# Patient Record
Sex: MALE | Race: WHITE | Employment: OTHER | URBAN - METROPOLITAN AREA
[De-identification: names, ages, dates, MRNs, and addresses within clinical notes are randomized per-mention and may not be internally consistent; named-entity substitution may affect disease eponyms.]

---

## 2017-03-26 PROBLEM — Z72.0 TOBACCO ABUSE: Status: ACTIVE | Noted: 2017-03-26

## 2017-03-26 PROBLEM — R07.9 CHEST PAIN: Status: ACTIVE | Noted: 2017-03-26

## 2018-06-10 ENCOUNTER — HOSPITAL ENCOUNTER (EMERGENCY)
Age: 48
Discharge: HOME | End: 2018-06-10
Payer: COMMERCIAL

## 2018-06-10 VITALS
DIASTOLIC BLOOD PRESSURE: 72 MMHG | SYSTOLIC BLOOD PRESSURE: 106 MMHG | HEIGHT: 72 IN | WEIGHT: 185 LBS | BODY MASS INDEX: 25.06 KG/M2 | HEART RATE: 87 BPM | RESPIRATION RATE: 14 BRPM | TEMPERATURE: 98.3 F | OXYGEN SATURATION: 96 %

## 2018-06-10 DIAGNOSIS — K04.7 DENTAL ABSCESS: Primary | ICD-10-CM

## 2018-06-10 DIAGNOSIS — K02.9 DENTAL CARIES: ICD-10-CM

## 2018-06-10 PROCEDURE — 99282 EMERGENCY DEPT VISIT SF MDM: CPT

## 2018-06-10 RX ORDER — PENICILLIN V POTASSIUM 500 MG/1
500 TABLET ORAL 4 TIMES DAILY
Qty: 40 TABLET | Refills: 0 | Status: SHIPPED | OUTPATIENT
Start: 2018-06-10 | End: 2018-06-20

## 2018-06-10 RX ORDER — NAPROXEN 500 MG/1
500 TABLET ORAL 2 TIMES DAILY
Qty: 14 TABLET | Refills: 0 | Status: ON HOLD | OUTPATIENT
Start: 2018-06-10 | End: 2018-07-18

## 2018-06-10 ASSESSMENT — PAIN SCALES - GENERAL: PAINLEVEL_OUTOF10: 8

## 2018-06-10 ASSESSMENT — PAIN DESCRIPTION - PAIN TYPE: TYPE: ACUTE PAIN

## 2018-06-10 ASSESSMENT — PAIN DESCRIPTION - LOCATION: LOCATION: MOUTH

## 2018-06-10 ASSESSMENT — PAIN DESCRIPTION - ORIENTATION: ORIENTATION: RIGHT

## 2018-06-10 ASSESSMENT — PAIN DESCRIPTION - DESCRIPTORS: DESCRIPTORS: SORE

## 2018-07-17 ENCOUNTER — HOSPITAL ENCOUNTER (OUTPATIENT)
Age: 48
Setting detail: OBSERVATION
Discharge: HOME | End: 2018-07-18
Attending: EMERGENCY MEDICINE | Admitting: INTERNAL MEDICINE
Payer: COMMERCIAL

## 2018-07-17 ENCOUNTER — APPOINTMENT (OUTPATIENT)
Dept: CT IMAGING | Age: 48
End: 2018-07-17
Payer: COMMERCIAL

## 2018-07-17 ENCOUNTER — APPOINTMENT (OUTPATIENT)
Dept: GENERAL RADIOLOGY | Age: 48
End: 2018-07-17
Payer: COMMERCIAL

## 2018-07-17 DIAGNOSIS — R20.2 RIGHT LEG PARESTHESIAS: ICD-10-CM

## 2018-07-17 DIAGNOSIS — R07.9 CHEST PAIN, UNSPECIFIED TYPE: Primary | ICD-10-CM

## 2018-07-17 LAB
ALBUMIN SERPL-MCNC: 3.9 G/DL (ref 3.5–5.2)
ALP BLD-CCNC: 51 U/L (ref 40–129)
ALT SERPL-CCNC: 11 U/L (ref 0–40)
ANION GAP SERPL CALCULATED.3IONS-SCNC: 12 MMOL/L (ref 7–16)
AST SERPL-CCNC: 21 U/L (ref 0–39)
BILIRUB SERPL-MCNC: 0.2 MG/DL (ref 0–1.2)
BUN BLDV-MCNC: 17 MG/DL (ref 6–20)
CALCIUM SERPL-MCNC: 8.8 MG/DL (ref 8.6–10.2)
CHLORIDE BLD-SCNC: 109 MMOL/L (ref 98–107)
CO2: 25 MMOL/L (ref 22–29)
CREAT SERPL-MCNC: 1 MG/DL (ref 0.7–1.2)
D DIMER: 280 NG/ML DDU
EKG ATRIAL RATE: 82 BPM
EKG P AXIS: 66 DEGREES
EKG P-R INTERVAL: 136 MS
EKG Q-T INTERVAL: 388 MS
EKG QRS DURATION: 86 MS
EKG QTC CALCULATION (BAZETT): 453 MS
EKG R AXIS: 81 DEGREES
EKG T AXIS: 52 DEGREES
EKG VENTRICULAR RATE: 82 BPM
GFR AFRICAN AMERICAN: >60
GFR NON-AFRICAN AMERICAN: >60 ML/MIN/1.73
GLUCOSE BLD-MCNC: 113 MG/DL (ref 74–109)
HCT VFR BLD CALC: 40 % (ref 37–54)
HEMOGLOBIN: 13.5 G/DL (ref 12.5–16.5)
INR BLD: 1.1
MCH RBC QN AUTO: 29.2 PG (ref 26–35)
MCHC RBC AUTO-ENTMCNC: 33.8 % (ref 32–34.5)
MCV RBC AUTO: 86.6 FL (ref 80–99.9)
PDW BLD-RTO: 13.2 FL (ref 11.5–15)
PLATELET # BLD: 247 E9/L (ref 130–450)
PMV BLD AUTO: 10 FL (ref 7–12)
POTASSIUM SERPL-SCNC: 3.9 MMOL/L (ref 3.5–5)
PROTHROMBIN TIME: 13 SEC (ref 9.3–12.4)
RBC # BLD: 4.62 E12/L (ref 3.8–5.8)
SODIUM BLD-SCNC: 146 MMOL/L (ref 132–146)
TOTAL PROTEIN: 6.7 G/DL (ref 6.4–8.3)
TROPONIN: <0.01 NG/ML (ref 0–0.03)
WBC # BLD: 9.7 E9/L (ref 4.5–11.5)

## 2018-07-17 PROCEDURE — 71275 CT ANGIOGRAPHY CHEST: CPT

## 2018-07-17 PROCEDURE — 36415 COLL VENOUS BLD VENIPUNCTURE: CPT

## 2018-07-17 PROCEDURE — 84443 ASSAY THYROID STIM HORMONE: CPT

## 2018-07-17 PROCEDURE — 85378 FIBRIN DEGRADE SEMIQUANT: CPT

## 2018-07-17 PROCEDURE — 99285 EMERGENCY DEPT VISIT HI MDM: CPT

## 2018-07-17 PROCEDURE — 85610 PROTHROMBIN TIME: CPT

## 2018-07-17 PROCEDURE — 85027 COMPLETE CBC AUTOMATED: CPT

## 2018-07-17 PROCEDURE — 2140000000 HC CCU INTERMEDIATE R&B

## 2018-07-17 PROCEDURE — 83036 HEMOGLOBIN GLYCOSYLATED A1C: CPT

## 2018-07-17 PROCEDURE — 80053 COMPREHEN METABOLIC PANEL: CPT

## 2018-07-17 PROCEDURE — 71045 X-RAY EXAM CHEST 1 VIEW: CPT

## 2018-07-17 PROCEDURE — 84484 ASSAY OF TROPONIN QUANT: CPT

## 2018-07-17 PROCEDURE — 80061 LIPID PANEL: CPT

## 2018-07-17 PROCEDURE — 6360000004 HC RX CONTRAST MEDICATION: Performed by: RADIOLOGY

## 2018-07-17 PROCEDURE — 93005 ELECTROCARDIOGRAM TRACING: CPT | Performed by: EMERGENCY MEDICINE

## 2018-07-17 PROCEDURE — 6370000000 HC RX 637 (ALT 250 FOR IP): Performed by: STUDENT IN AN ORGANIZED HEALTH CARE EDUCATION/TRAINING PROGRAM

## 2018-07-17 RX ORDER — SODIUM CHLORIDE 9 MG/ML
INJECTION, SOLUTION INTRAVENOUS CONTINUOUS
Status: DISCONTINUED | OUTPATIENT
Start: 2018-07-17 | End: 2018-07-18 | Stop reason: HOSPADM

## 2018-07-17 RX ORDER — ASPIRIN 81 MG/1
324 TABLET, CHEWABLE ORAL ONCE
Status: COMPLETED | OUTPATIENT
Start: 2018-07-17 | End: 2018-07-17

## 2018-07-17 RX ORDER — ACETAMINOPHEN 325 MG/1
650 TABLET ORAL EVERY 4 HOURS PRN
Status: DISCONTINUED | OUTPATIENT
Start: 2018-07-17 | End: 2018-07-18 | Stop reason: HOSPADM

## 2018-07-17 RX ORDER — SODIUM CHLORIDE 0.9 % (FLUSH) 0.9 %
10 SYRINGE (ML) INJECTION PRN
Status: DISCONTINUED | OUTPATIENT
Start: 2018-07-17 | End: 2018-07-18

## 2018-07-17 RX ORDER — SODIUM CHLORIDE 0.9 % (FLUSH) 0.9 %
10 SYRINGE (ML) INJECTION EVERY 12 HOURS SCHEDULED
Status: DISCONTINUED | OUTPATIENT
Start: 2018-07-17 | End: 2018-07-18

## 2018-07-17 RX ADMIN — ASPIRIN 81 MG 324 MG: 81 TABLET ORAL at 21:28

## 2018-07-17 RX ADMIN — IOPAMIDOL 60 ML: 755 INJECTION, SOLUTION INTRAVENOUS at 23:06

## 2018-07-17 ASSESSMENT — ENCOUNTER SYMPTOMS
CONSTIPATION: 0
BACK PAIN: 0
NAUSEA: 0
SORE THROAT: 0
COUGH: 0
COLOR CHANGE: 0
ABDOMINAL PAIN: 0
VOMITING: 0
DIARRHEA: 0
SHORTNESS OF BREATH: 0

## 2018-07-17 ASSESSMENT — HEART SCORE: ECG: 0

## 2018-07-18 ENCOUNTER — TELEPHONE (OUTPATIENT)
Dept: ADMINISTRATIVE | Age: 48
End: 2018-07-18

## 2018-07-18 ENCOUNTER — APPOINTMENT (OUTPATIENT)
Dept: NUCLEAR MEDICINE | Age: 48
End: 2018-07-18
Payer: COMMERCIAL

## 2018-07-18 VITALS
OXYGEN SATURATION: 95 % | HEART RATE: 60 BPM | RESPIRATION RATE: 14 BRPM | WEIGHT: 174.3 LBS | HEIGHT: 72 IN | SYSTOLIC BLOOD PRESSURE: 110 MMHG | BODY MASS INDEX: 23.61 KG/M2 | DIASTOLIC BLOOD PRESSURE: 60 MMHG | TEMPERATURE: 98.1 F

## 2018-07-18 LAB
CHOLESTEROL, TOTAL: 175 MG/DL (ref 0–199)
HBA1C MFR BLD: 5.4 % (ref 4–5.6)
HDLC SERPL-MCNC: 36 MG/DL
LDL CHOLESTEROL CALCULATED: 112 MG/DL (ref 0–99)
LV EF: 49 %
LVEF MODALITY: NORMAL
TRIGL SERPL-MCNC: 137 MG/DL (ref 0–149)
TROPONIN: <0.01 NG/ML (ref 0–0.03)
TSH SERPL DL<=0.05 MIU/L-ACNC: 0.75 UIU/ML (ref 0.27–4.2)
VLDLC SERPL CALC-MCNC: 27 MG/DL

## 2018-07-18 PROCEDURE — 36415 COLL VENOUS BLD VENIPUNCTURE: CPT

## 2018-07-18 PROCEDURE — 99244 OFF/OP CNSLTJ NEW/EST MOD 40: CPT | Performed by: INTERNAL MEDICINE

## 2018-07-18 PROCEDURE — 93017 CV STRESS TEST TRACING ONLY: CPT

## 2018-07-18 PROCEDURE — A9500 TC99M SESTAMIBI: HCPCS | Performed by: RADIOLOGY

## 2018-07-18 PROCEDURE — 6360000002 HC RX W HCPCS: Performed by: INTERNAL MEDICINE

## 2018-07-18 PROCEDURE — 78452 HT MUSCLE IMAGE SPECT MULT: CPT

## 2018-07-18 PROCEDURE — G0378 HOSPITAL OBSERVATION PER HR: HCPCS

## 2018-07-18 PROCEDURE — 84484 ASSAY OF TROPONIN QUANT: CPT

## 2018-07-18 PROCEDURE — 96372 THER/PROPH/DIAG INJ SC/IM: CPT

## 2018-07-18 PROCEDURE — 3430000000 HC RX DIAGNOSTIC RADIOPHARMACEUTICAL: Performed by: RADIOLOGY

## 2018-07-18 PROCEDURE — APPSS60 APP SPLIT SHARED TIME 46-60 MINUTES: Performed by: NURSE PRACTITIONER

## 2018-07-18 PROCEDURE — 2580000003 HC RX 258: Performed by: INTERNAL MEDICINE

## 2018-07-18 RX ORDER — SODIUM CHLORIDE 0.9 % (FLUSH) 0.9 %
10 SYRINGE (ML) INJECTION EVERY 12 HOURS SCHEDULED
Status: DISCONTINUED | OUTPATIENT
Start: 2018-07-18 | End: 2018-07-18 | Stop reason: HOSPADM

## 2018-07-18 RX ORDER — SODIUM CHLORIDE 0.9 % (FLUSH) 0.9 %
10 SYRINGE (ML) INJECTION PRN
Status: DISCONTINUED | OUTPATIENT
Start: 2018-07-18 | End: 2018-07-18 | Stop reason: HOSPADM

## 2018-07-18 RX ADMIN — Medication 10 MILLICURIE: at 11:32

## 2018-07-18 RX ADMIN — Medication 10 ML: at 09:18

## 2018-07-18 RX ADMIN — Medication 34 MILLICURIE: at 13:37

## 2018-07-18 RX ADMIN — Medication 10 ML: at 02:00

## 2018-07-18 RX ADMIN — ENOXAPARIN SODIUM 40 MG: 100 INJECTION SUBCUTANEOUS at 09:17

## 2018-07-18 RX ADMIN — SODIUM CHLORIDE: 9 INJECTION, SOLUTION INTRAVENOUS at 02:00

## 2018-07-18 ASSESSMENT — PAIN SCALES - GENERAL
PAINLEVEL_OUTOF10: 0
PAINLEVEL_OUTOF10: 0

## 2018-07-18 NOTE — ED NOTES
Bed: 18B-18  Expected date:   Expected time:   Means of arrival:   Comments:  Triage      Aldair Doty RN  07/17/18 2052

## 2018-07-18 NOTE — H&P
disease)        History reviewed. No pertinent surgical history. Family Status   Relation Status    Mother     Father         Prior to Admission medications    Medication Sig Start Date End Date Taking? Authorizing Provider   ibuprofen (ADVIL;MOTRIN) 800 MG tablet Take 1 tablet by mouth every 6 hours as needed for Pain 17  JENIFFER Ramirez        Social History     Social History    Marital status:      Spouse name: N/A    Number of children: N/A    Years of education: N/A     Social History Main Topics    Smoking status: Current Every Day Smoker     Packs/day: 0.50     Years: 30.00    Smokeless tobacco: Never Used    Alcohol use Yes      Comment: Occasionally    Drug use: No      Comment: Pt did cocaine x30 years- stopped a few months ago     Sexual activity: Yes     Partners: Female     Other Topics Concern    None     Social History Narrative    None       No Known Allergies    The patient's medical records have been reviewed. Review of Systems:   · General: Denies malaise or weakness. Denies fever or chills. · Eyes: No visual changes or diplopia. No swelling or pain. · ENT: No Headaches, tinnitus or vertigo. No mouth sores or sore throat. · Cardiovascular: HPI  · Respiratory: No cough or wheezing, hemoptysis, sob, pleuritic pain. · Gastrointestinal: No anorexia, hematochezia, melena, hematemesis or change in bowels. · Genitourinary: No dysuria, trouble voiding, or hematuria. No change in urination. · Musculoskeletal:  No joint pain or inflammation. No limb weakness. · Integumentary: No rash or pruritis. No abnormal pigmentation,  masses, hair or nail changes  · Neurological: No unusual headaches, weakness, numbness or tingling. No change in gait, balance, coordination, memory, mentation, behavior. · Psychiatric: No anxiety, or depression. Mood and affect reported as normal  · Endocrine: No temperature intolerance.  No polydipsia or polyuria. · Hematologic: No abnormal bruising or bleeding, blood clots or swollen lymph nodes. no anemia, abnormal bleeding/bruising, fever,chills, night sweats, swollen glands. · Allergic/Immunologic: No nasal congestion or hives. Physical Examination:      Wt Readings from Last 3 Encounters:   07/18/18 174 lb 4.8 oz (79.1 kg)   06/10/18 185 lb (83.9 kg)   11/24/17 180 lb (81.6 kg)     Temp Readings from Last 3 Encounters:   07/18/18 98 °F (36.7 °C) (Oral)   06/10/18 98.3 °F (36.8 °C) (Temporal)   11/24/17 97.6 °F (36.4 °C)     BP Readings from Last 3 Encounters:   07/18/18 117/74   06/10/18 106/72   11/24/17 131/81     Pulse Readings from Last 3 Encounters:   07/18/18 65   06/10/18 87   11/24/17 85     @LASTSAO2(3)@  General appearance: Normal, awake, alert no distress. Skin: Color, texture, turgor normal. No rashes or lesions. Head: Normocephalic. No masses, lesions, tenderness or abnormalities   Face: Symmetric no visible lesions  Eyes: Conjunctivae/cornea clear. [place]. Sclera non icteric. Ears: External appearance normal.  Hearing grossly normal  Nose/Sinuses: Nares normal. No paranasal sinus tenderness. Mouth: Lips and tongue appear normal. Dentition noted  Neck:  Symmetric. No adenopathy. Thyroid symmetric, normal size, without nodules. Trachea is midline. Carotids palpable-and assessed. Chest: Even excursion   Lungs: Clear to auscultation. No rhonchi, crackles or rales. Heart: S1 > S2. Regular rate and rhythm. No gallop rub or murmur. Abdomen: Soft, mildly protuberant, non-tender. BS normal. No masses, organomegaly. Anatomic contours appear normal.   Extremities: No deformities, edema, or skin discoloration. Peripheral perfusion assessed in all exremities. No cyanosis  Musculoskeletal: No unusual pain or swelling. Muscular strength intact. Neuro:   · Cranial nerves grossly intact. · Motor: Strength grossly normal. No focal weakness. · Sensory: grossly normal to touch. · No cerebellar signs---Coordination intact. Mental status: Awake, alert, cognizant and interactive. Patient appears capable of directing self care   Mood: Normal and appropriate affect  Gait & balance: not assessed:     Labs     CBC:   Lab Results   Component Value Date    WBC 9.7 07/17/2018    RBC 4.62 07/17/2018    HGB 13.5 07/17/2018    HCT 40.0 07/17/2018     07/17/2018    MCV 86.6 07/17/2018     BMP:    Lab Results   Component Value Date     07/17/2018    K 3.9 07/17/2018     07/17/2018    CO2 25 07/17/2018    BUN 17 07/17/2018    CREATININE 1.0 07/17/2018    GLUCOSE 113 07/17/2018    CALCIUM 8.8 07/17/2018     Hepatic Function Panel:    Lab Results   Component Value Date    ALKPHOS 51 07/17/2018    AST 21 07/17/2018    ALT 11 07/17/2018    PROT 6.7 07/17/2018    LABALBU 3.9 07/17/2018    BILITOT 0.2 07/17/2018     Magnesium:  No results found for: MG  Cardiac Enzymes:   Lab Results   Component Value Date    TROPONINI <0.01 07/17/2018    TROPONINI <0.01 03/26/2017    TROPONINI <0.01 03/25/2017     LDH:  No results found for: LDH  PT/INR:    Lab Results   Component Value Date    PROTIME 13.0 07/17/2018    INR 1.1 07/17/2018     BNP: No results for input(s): BNP in the last 72 hours.    TSH:   Lab Results   Component Value Date    TSH 0.750 07/17/2018      Cardiac Injury Profile:   Recent Labs      07/17/18   2121   TROPONINI  <0.01      Lipid Profile:   Lab Results   Component Value Date    TRIG 137 07/17/2018    HDL 36 07/17/2018    LDLCALC 112 07/17/2018    CHOL 175 07/17/2018      Hemoglobin A1C: No components found for: HGBA1C   U/A: No results found for: NITRITE, LEUKOCYTESUR, PHUR, WBCUA, RBCUA, BACTERIA, SPECGRAV, BLOODU, GLUCOSEU  ABG:  No results found for: PHART, DXE4ETC, PO2ART, R0RDJMMG, LVU9IKO, BEART  TSH:    Lab Results   Component Value Date    TSH 0.750 07/17/2018       ADMISSION SCHEDULED MEDS:   Current Facility-Administered Medications   Medication Dose Route Frequency Provider Last Rate Last Dose    sodium chloride flush 0.9 % injection 10 mL  10 mL Intravenous 2 times per day Junaid Billings MD   10 mL at 07/18/18 0918    sodium chloride flush 0.9 % injection 10 mL  10 mL Intravenous PRN Junaid Billings MD        magnesium hydroxide (MILK OF MAGNESIA) 400 MG/5ML suspension 30 mL  30 mL Oral Daily PRN Junaid Billings MD        enoxaparin (LOVENOX) injection 40 mg  40 mg Subcutaneous Daily Junaid Billings MD   40 mg at 07/18/18 5847    acetaminophen (TYLENOL) tablet 650 mg  650 mg Oral Q4H PRN Junaid Billings MD        0.9 % sodium chloride infusion   Intravenous Continuous Junaid Billings MD 75 mL/hr at 07/18/18 0200         Current  Infusions   sodium chloride 75 mL/hr at 07/18/18 0200       Prn Meds  sodium chloride flush, magnesium hydroxide, acetaminophen    Radiology Review:  CTA CHEST W CONTRAST   Final Result      1. No pulmonary embolism. 2.  6 mm noncalcified nodule within the posterior left lower lobe (series 4,    image 11). ACR White Paper guidelines ([employer] al. Radiology 2017;    912(2):468-43) suggest the following. For low-risk patients, no follow-up is    necessary. For high-risk patients (smoking history or other known risk    factors) an optional chest CT at 12 months could be performed. 3.  Incidental/non-acute findings are described above.       This report has been electronically signed by Shi Altamirano MD.      XR CHEST PORTABLE   Final Result   normal chest                     Stress/Rest NM Myocardial SPECT RX    (Results Pending)         ASSESSMENT:  NON-CARDIAC CHEST PAIN  Patient Active Problem List   Diagnosis    Chest pain    Tobacco use    Atypical chest pain    Cocaine use    Nodule of left lung    Numbness and tingling of right lower extremity       PLAN:  HE IS HAVING STRESS TEST DONE  WILL DETERMINE DISPOSITION WHEN RESULTED  DISCUSSED OTHER DIAGNOSIS THAT NEED EVALUATED AFTER  BY pcp    See  Orders  Wayne Curran MD, Opal Myers, American Board of Internal Medicine  Diplomate, [facility] Board of Geriatric Medicine  10:49 AM  7/18/2018

## 2018-07-18 NOTE — H&P
(83.9 kg)   11/24/17 180 lb (81.6 kg)     Temp Readings from Last 3 Encounters:   07/18/18 98 °F (36.7 °C) (Oral)   06/10/18 98.3 °F (36.8 °C) (Temporal)   11/24/17 97.6 °F (36.4 °C)     BP Readings from Last 3 Encounters:   07/18/18 117/74   06/10/18 106/72   11/24/17 131/81     Pulse Readings from Last 3 Encounters:   07/18/18 65   06/10/18 87   11/24/17 85     @LASTSAO2(3)@  General appearance: Normal, awake, alert no distress. Skin: Color, texture, turgor normal. No rashes or lesions. Head: Normocephalic. No masses, lesions, tenderness or abnormalities   Face: Symmetric no visible lesions  Eyes: Conjunctivae/cornea clear. Aundra [place]. Sclera non icteric. Ears: External appearance normal.  Hearing grossly normal  Nose/Sinuses: Nares normal. No paranasal sinus tenderness. Mouth: Lips and tongue appear normal. Dentition noted  Neck:  Symmetric. No adenopathy. Thyroid symmetric, normal size, without nodules. Trachea is midline. Carotids palpable-and assessed. Chest: Even excursion   Lungs: Clear to auscultation. No rhonchi, crackles or rales. Heart: S1 > S2. Regular rate and rhythm. No gallop rub or murmur. Abdomen: Soft, mildly protuberant, non-tender. BS normal. No masses, organomegaly. Anatomic contours appear normal.   Extremities: No deformities, edema, or skin discoloration. Peripheral perfusion assessed in all exremities. No cyanosis  Musculoskeletal: No unusual pain or swelling. Muscular strength intact. Neuro:   · Cranial nerves grossly intact. · Motor: Strength grossly normal. No focal weakness. · Sensory: grossly normal to touch. · No cerebellar signs---Coordination intact. Mental status: Awake, alert, cognizant and interactive. Patient appears capable of directing self care   Mood: Normal and appropriate affect  Gait & balance: not assessed:     Labs     CBC:   Lab Results   Component Value Date    WBC 9.7 07/17/2018    RBC 4.62 07/17/2018    HGB 13.5 07/17/2018    HCT 40.0 Brigitte Vila MD        enoxaparin (LOVENOX) injection 40 mg  40 mg Subcutaneous Daily Conrad Quinteros MD   40 mg at 07/18/18 1562    technetium sestamibi (CARDIOLITE) injection 34 millicurie  34 millicurie Intravenous ONCE PRN Namrata Rae II, MD        acetaminophen (TYLENOL) tablet 650 mg  650 mg Oral Q4H PRN Conrad Quinteros MD        0.9 % sodium chloride infusion   Intravenous Continuous Conrad Quinteros MD 75 mL/hr at 07/18/18 0200         Current  Infusions   sodium chloride 75 mL/hr at 07/18/18 0200       Prn Meds  sodium chloride flush, magnesium hydroxide, technetium sestamibi, acetaminophen    Radiology Review:  CTA CHEST W CONTRAST   Final Result      1. No pulmonary embolism. 2.  6 mm noncalcified nodule within the posterior left lower lobe (series 4,    image 11). ACR White Paper guidelines ([place] al. Radiology 2017;    876(5):969-15) suggest the following. For low-risk patients, no follow-up is    necessary. For high-risk patients (smoking history or other known risk    factors) an optional chest CT at 12 months could be performed. 3.  Incidental/non-acute findings are described above.       This report has been electronically signed by Natali Diego MD.      XR CHEST PORTABLE   Final Result   normal chest                     Stress/Rest NM Myocardial SPECT RX    (Results Pending)         ASSESSMENT:  Patient Active Problem List   Diagnosis    Chest pain    Tobacco use    Atypical chest pain    Cocaine use    Nodule of left lung    Numbness and tingling of right lower extremity       PLAN:  Await stress test completion  He needs to see a PCP-has not received any healthcare      See  Orders  Hamp Kehr, MD, [address], American Board of Internal Medicine  Diplomate, [facility] Board of Geriatric Medicine  12:29 PM  7/18/2018

## 2018-07-18 NOTE — CONSULTS
headaches or hearing loss. No mouth sores or sore throat. No epistaxis   · Cardiovascular: + chest pain. Denies palpitations. No lower extremity swelling. · Respiratory: + Wheezing/SY. + Dry cough. Denies orthopnea or PND. No hemoptysis   · Gastrointestinal: Denies hematemesis or anorexia. No hematochezia or melena    · Genitourinary: Denies urgency, dysuria or hematuria. · Musculoskeletal: Denies gait disturbance, weakness or joint complaints  · Integumentary: Denies rash, hives or pruritis   · Neurological: Denies dizziness, headaches or seizures. No numbness/ tingling RLE  · Psychiatric: + feels anxious. · Endocrine: Denies temperature intolerance. No recent weight change. .  · Hematologic/Lymphatic: Denies abnormal bruising or bleeding. No swollen lymph nodes    PHYSICAL EXAM:   /74   Pulse 65   Temp 98 °F (36.7 °C) (Oral)   Resp 16   Ht 6' (1.829 m)   Wt 174 lb 4.8 oz (79.1 kg)   SpO2 96%   BMI 23.64 kg/m²   CONST:  Well developed, well nourished who appears of stated age. Awake, alert and cooperative. No apparent distress. HEENT:   Head- Normocephalic, atraumatic   Eyes- Conjunctivae pink, anicteric  Throat- Oral mucosa pink and moist  Neck-  No stridor, trachea midline, no jugular venous distention. No carotid bruit. CHEST: Chest symmetrical and non-tender to palpation. No accessory muscle use or intercostal retractions. R chest wall probable lipoma. RESPIRATORY: Lung sounds - coarse BS with some faint expiratory wheezing. On RA. CARDIOVASCULAR:     Heart Ausculation- Regular rate and rhythm, no murmur. No rub   PV: No lower extremity edema. No varicosities. Pedal pulses palpable, no clubbing or cyanosis   ABDOMEN: Soft, non-tender to light palpation. Bowel sounds present. No palpable masses; no abdominal bruit  MS: Good muscle strength and tone. No atrophy or abnormal movements.    : Deferred  SKIN: Warm and dry no statis dermatitis or ulcers   NEURO / PSYCH: Oriented to person, place and time. Speech clear and appropriate. Follows all commands. Pleasant affect     DATA:    ECG See HPI  Tele strips: SR  Diagnostic:  See HPI    Intake/Output Summary (Last 24 hours) at 07/18/18 0948  Last data filed at 07/18/18 0655   Gross per 24 hour   Intake              525 ml   Output                0 ml   Net              525 ml       Labs:   CBC:   Recent Labs      07/17/18 2121   WBC  9.7   HGB  13.5   HCT  40.0   PLT  247     BMP: Recent Labs      07/17/18 2121   NA  146   K  3.9   CO2  25   BUN  17   CREATININE  1.0   LABGLOM  >60   CALCIUM  8.8       HgA1c:   Lab Results   Component Value Date    LABA1C 5.4 03/30/2017     PT/INR:   Recent Labs      07/17/18 2121   PROTIME  13.0*   INR  1.1       CARDIAC ENZYMES:  Recent Labs      07/17/18 2121   TROPONINI  <0.01     FASTING LIPID PANEL:  Lab Results   Component Value Date    CHOL 176 03/26/2017    HDL 42 03/26/2017    LDLCALC 111 03/26/2017    TRIG 115 03/26/2017     LIVER PROFILE:  Recent Labs      07/17/18 2121   AST  21   ALT  11   LABALBU  3.9   ASSESSMENT:  1. Atypical CP: Troponin x 1 negative, no EKG changes  2. SY/Wheezing. Hx of \"chronic bronchitis\"  3. Ongoing tobacco use  4. L lung nodule  5. Hx cocaine use (snorts)  6. RLE pain with ambulation>>??PVD    PLAN:  1. Troponin now  2. Lipid profile/UDS  3. Exercise Nuclear stress this afternoon. If negative patient may be discharged home from cardiology standpoint  4. Discussed need for patient to establish with PCP, willing to establish at [facility], RN notified. 5. Smoking cessation stressed to patient    Discussed with Dr Pam Germain  Electronically signed by Reij Bergeron.  ABRAN Liz on 7/18/2018 at 9:48 AM     Addendum:  Leopoldo Buddle, MD    Reason for consult:  Chest pain    Pt known not known to  [facility]    History of Present illness: 52 yr pt with significant family Hx of premature CAD admitted for chest pain; cardiology consulted for further recommendations; Denies any further CP; no SOB or palpitations; no Orthopnea    Review of systems:  Review of 10 systems negative except as mentioned in the HPI    History: Past Medical and Surgical history: Reviewed    Allergies and Social Hx: Reviewed. Medications: reviewed. Labs/imaging studies: Reviewed. Above SANDRA exam, assessment reviewed reviewed. I personally saw, examined, and evaluated the patient today. I personally reviewed the medications, rhythm strips, pertinent labs and test reports. I directly participated in the medical-decision making, ordering pertinent tests and medication adjustment. Physical exam:     Vitals:    07/18/18 0834   BP: 117/74   Pulse: 65   Resp: 16   Temp: 98 °F (36.7 °C)   SpO2: 96%       In general, this is a well developed, well nourished who appears stated age. awake, alert, cooperative, no apparent distress    HEENT: eyes -conjunctivae pink,   Neck-  no stridor, no carotid bruit. no jugular venous distention   RESPIRATORY: Chest symmetrical and non-tender to palpation. No accessory muscle use. Lung auscultation - clear to auscultation except few rhonchi  CARDIOVASCULAR:     Heart Inspection shows no noted pulsations  Heart Palpation - no palpable thrills  Heart Ausculation - Regular rate and rhythm, 1/6 systolic murmur, No s3 or rub. No lower extremity edema, no varicosities. Distal pulses palpable, no clubbing or cyanosis   ABDOMEN: Soft, nontender,  Bowel sounds present. MS: good muscle strength and tone. : Deferred  Rectal Exam: Deferred  SKIN: warm and dry  NEURO / PSYCH: oriented to person, place        Impression/Recommendations:    Chest pain:  MI ruled out-Exercise nuclear stress test due to CP, Early CAD in several family members    Tobacco use: Counseled to quit smoking    SY: Mild, no acute CHF, better now          If stress test normal, can be discharged home      Thank you for the consult.         Hamzah Thomas MD  7/18/2018  10:24 AM  [facility])

## 2018-07-18 NOTE — ED PROVIDER NOTES
The patient is a 51-year-old male with a history of GERD chronic low back pain who presents to the emergency department complaining of 3 episodes of left-sided chest pain that occur for a few minutes at a time. The pain woke the patient up from sleep at 0530 this morning. The pain is described as sharp and nonradiating and occurs sporadically. Patient is not taking any medication for pain relief. He does smoke cigarettes but denies any alcohol or drug use. The patient had a similar episode of chest pain one year ago during which she was admitted to the hospital and had a negative cardiac workup and stress test. He states that all of his brothers, sisters and parents have had heart attacks and hear disease. The patient denies any nausea or vomiting. Vital signs are normal at this time. Additionally,            Review of Systems   Constitutional: Negative for activity change, appetite change, chills, diaphoresis, fatigue and fever. HENT: Negative for congestion and sore throat. Eyes: Negative for visual disturbance. Respiratory: Negative for cough and shortness of breath. Cardiovascular: Positive for chest pain (Left parasternal). Negative for palpitations and leg swelling. Gastrointestinal: Negative for abdominal pain, constipation, diarrhea, nausea and vomiting. Endocrine: Negative for polyuria. Genitourinary: Negative for decreased urine volume, difficulty urinating, dysuria, flank pain and hematuria. Musculoskeletal: Negative for arthralgias, back pain, joint swelling, myalgias, neck pain and neck stiffness. Skin: Negative for color change, pallor, rash and wound. Allergic/Immunologic: Negative for immunocompromised state. Neurological: Negative for dizziness, seizures, syncope, weakness, light-headedness and headaches. Hematological: Negative for adenopathy. Does not bruise/bleed easily. Psychiatric/Behavioral: Negative.         Physical Exam   Constitutional: He is oriented to person, place, and time. He appears well-developed and well-nourished. No distress. HENT:   Head: Normocephalic and atraumatic. Right Ear: External ear normal.   Left Ear: External ear normal.   Nose: Nose normal.   Mouth/Throat: Oropharynx is clear and moist. No oropharyngeal exudate. Eyes: Conjunctivae and EOM are normal. Pupils are equal, round, and reactive to light. Right eye exhibits no discharge. Left eye exhibits no discharge. No scleral icterus. Neck: Normal range of motion. Neck supple. No JVD present. No tracheal deviation present. No thyromegaly present. Cardiovascular: Normal rate, regular rhythm, normal heart sounds and intact distal pulses. Exam reveals no gallop and no friction rub. No murmur heard. Pulmonary/Chest: Effort normal and breath sounds normal. No stridor. No respiratory distress. He has no wheezes. He has no rales. He exhibits no tenderness. Abdominal: Soft. Bowel sounds are normal. He exhibits no distension and no mass. There is no tenderness. There is no rebound and no guarding. Musculoskeletal: Normal range of motion. He exhibits no edema, tenderness or deformity. Lymphadenopathy:     He has no cervical adenopathy. Neurological: He is alert and oriented to person, place, and time. Skin: Skin is warm and dry. No rash noted. He is not diaphoretic. No erythema. No pallor. Psychiatric: He has a normal mood and affect. His behavior is normal. Judgment and thought content normal.   Nursing note and vitals reviewed. Procedures    MDM  The patient presented with multiple episodes of left-sided chest pain. Patient was reported intermittent right sided leg numbness. The patient was a symptomatically the emergency department. Patient had a negative cardiac workup. He was given chewable aspirin. Patient has a strong family history of cardiac disease.  Due to a slightly elevated d-dimer of 280 as well as the report of intermittent leg numbness a CTA of the chest was is signed and interpreted by me. Rate: 82  Rhythm: Sinus  Interpretation: no acute changes  Comparison: stable as compared to patient's most recent EKG on 3/25/2018    ED Course as of Jul 18 0003 Tue Jul 17, 2018 2222 Patient reassessed. He is chest pain-free at this time. He is updated on his lab results and plan for CTA of the chest due to an elevated d-dimer. Patient agreeable to admission.  [LS]      ED Course User Index  [LS] Yannick SuttonDO       --------------------------------------------- PAST HISTORY ---------------------------------------------  Past Medical History:  has a past medical history of GERD (gastroesophageal reflux disease). Past Surgical History:  has no past surgical history on file. Social History:  reports that he has been smoking. He has a 15.00 pack-year smoking history. He has never used smokeless tobacco. He reports that he drinks alcohol. He reports that he does not use drugs. Family History: family history includes Diabetes in his father and mother; Heart Disease in his father and mother; High Blood Pressure in his father and mother; High Cholesterol in his father and mother. The patients home medications have been reviewed. Allergies: Patient has no known allergies.     -------------------------------------------------- RESULTS -------------------------------------------------    LABS:  Results for orders placed or performed during the hospital encounter of 07/17/18   CBC   Result Value Ref Range    WBC 9.7 4.5 - 11.5 E9/L    RBC 4.62 3.80 - 5.80 E12/L    Hemoglobin 13.5 12.5 - 16.5 g/dL    Hematocrit 40.0 37.0 - 54.0 %    MCV 86.6 80.0 - 99.9 fL    MCH 29.2 26.0 - 35.0 pg    MCHC 33.8 32.0 - 34.5 %    RDW 13.2 11.5 - 15.0 fL    Platelets 171 445 - 893 E9/L    MPV 10.0 7.0 - 12.0 fL   Comprehensive Metabolic Panel   Result Value Ref Range    Sodium 146 132 - 146 mmol/L    Potassium 3.9 3.5 - 5.0 mmol/L    Chloride 109 (H) 98 - 107 mmol/L    CO2 25 22 - 29 chest pains. Patient reports he has 3 episodes that lasted over 5-10 minutes today. Patient reports feeling near syncopal. Patient also going vague pain in his leg/numbness. Patient reporting no bone pain or vomiting. Patient does report significant history of heart disease in the family. Patient currently having no chest pains. Patient awake alert oriented ×3 heart and lung exam within normal limits abdomen is soft and nontender pulses are intact distally. Labs and EKG reviewed. D-dimer slightly elevated and due to his chest pain as well as this numbness/discomfort in his leg CT was ordered. We did speak to on-call internal medicine patient will be admitted.          Torsten Cantu MD  07/18/18 0246       Torsten Cantu MD  07/18/18 9646

## 2018-07-18 NOTE — PROGRESS NOTES
Exercise Nuclear Stress Test:    Treadmill stress completed. Achieved 810% of THR and 12.4 METS DTS +11 without chest pain; No ischemia on ECG. Nuclear SPECT images pending.     Electronically signed by Hansel Munoz MD on 7/18/2018 at 1:41 PM

## 2018-08-22 ENCOUNTER — HOSPITAL ENCOUNTER (EMERGENCY)
Age: 48
Discharge: HOME | End: 2018-08-22
Payer: COMMERCIAL

## 2018-08-22 VITALS
HEIGHT: 72 IN | OXYGEN SATURATION: 98 % | BODY MASS INDEX: 24.38 KG/M2 | DIASTOLIC BLOOD PRESSURE: 82 MMHG | WEIGHT: 180 LBS | HEART RATE: 73 BPM | RESPIRATION RATE: 16 BRPM | TEMPERATURE: 98.3 F | SYSTOLIC BLOOD PRESSURE: 112 MMHG

## 2018-08-22 DIAGNOSIS — L23.7 ALLERGIC CONTACT DERMATITIS DUE TO PLANTS, EXCEPT FOOD: Primary | ICD-10-CM

## 2018-08-22 PROCEDURE — 6370000000 HC RX 637 (ALT 250 FOR IP): Performed by: PHYSICIAN ASSISTANT

## 2018-08-22 PROCEDURE — 6360000002 HC RX W HCPCS: Performed by: PHYSICIAN ASSISTANT

## 2018-08-22 PROCEDURE — 64400 NJX AA&/STRD TRIGEMINAL NRV: CPT

## 2018-08-22 PROCEDURE — 99282 EMERGENCY DEPT VISIT SF MDM: CPT

## 2018-08-22 RX ORDER — NAPROXEN 500 MG/1
500 TABLET ORAL 2 TIMES DAILY
Qty: 14 TABLET | Refills: 0 | Status: ON HOLD | OUTPATIENT
Start: 2018-08-22 | End: 2018-09-30

## 2018-08-22 RX ORDER — DIPHENHYDRAMINE HCL 25 MG
50 TABLET ORAL ONCE
Status: COMPLETED | OUTPATIENT
Start: 2018-08-22 | End: 2018-08-22

## 2018-08-22 RX ORDER — FAMOTIDINE 20 MG/1
20 TABLET, FILM COATED ORAL 2 TIMES DAILY
Qty: 6 TABLET | Refills: 0 | Status: ON HOLD | OUTPATIENT
Start: 2018-08-22 | End: 2018-09-30

## 2018-08-22 RX ORDER — HYDROXYZINE PAMOATE 25 MG/1
25 CAPSULE ORAL 3 TIMES DAILY PRN
Qty: 21 CAPSULE | Refills: 0 | Status: SHIPPED | OUTPATIENT
Start: 2018-08-22 | End: 2018-08-29

## 2018-08-22 RX ORDER — PREDNISONE 20 MG/1
TABLET ORAL
Qty: 18 TABLET | Refills: 0 | Status: SHIPPED | OUTPATIENT
Start: 2018-08-22 | End: 2018-09-01

## 2018-08-22 RX ORDER — PENICILLIN V POTASSIUM 500 MG/1
500 TABLET ORAL 4 TIMES DAILY
Qty: 40 TABLET | Refills: 0 | Status: SHIPPED | OUTPATIENT
Start: 2018-08-22 | End: 2018-09-01

## 2018-08-22 RX ORDER — DEXAMETHASONE SODIUM PHOSPHATE 10 MG/ML
10 INJECTION INTRAMUSCULAR; INTRAVENOUS ONCE
Status: COMPLETED | OUTPATIENT
Start: 2018-08-22 | End: 2018-08-22

## 2018-08-22 RX ADMIN — DEXAMETHASONE SODIUM PHOSPHATE 10 MG: 10 INJECTION INTRAMUSCULAR; INTRAVENOUS at 02:26

## 2018-08-22 RX ADMIN — DIPHENHYDRAMINE HCL 50 MG: 25 TABLET ORAL at 02:26

## 2018-08-22 NOTE — ED PROVIDER NOTES
drugs. Family History: family history includes Diabetes in his father and mother; Heart Disease in his father and mother; High Blood Pressure in his father and mother; High Cholesterol in his father and mother. Allergies: Patient has no known allergies. Physical Exam           ED Triage Vitals [08/22/18 0220]   BP Temp Temp Source Pulse Resp SpO2 Height Weight   112/82 98.3 °F (36.8 °C) Oral 73 16 98 % 6' (1.829 m) 180 lb (81.6 kg)     Oxygen Saturation Interpretation: Normal.    Constitutional:  Alert, development consistent with age. HEENT:  NC/NT. Airway patent. Eyes:  No discharge. Ears:  External canals clear without exudate. Mouth:  Mucous membranes moist without lesions, tongue and gums normal.  Throat:  Numerous dental caries with previous extractions. There is an abscess next to tooth #34. No Tevin angina. No trismus. Pharynx without injection, exudate, or tonsillar hypertrophy. Airway patient. Neck:  Supple. No lymphadenopathy. Respiratory:  Clear to auscultation and breath sounds equal.  CV:  Regular rate and rhythm. GI:  Abdomen Soft, nontender, +BS. Integument:  Skin turgor: Normal.              Scattered maculopapular rash of the bilateral upper and lower extremities. With numerous excoriations present. .  Neurological:  Orientation age-appropriate unless noted elseware. Motor functions intact. Lab / Imaging Results   (All laboratory and radiology results have been personally reviewed by myself)  Labs:  No results found for this visit on 08/22/18. Imaging: All Radiology results interpreted by Radiologist unless otherwise noted. No orders to display       ED Course / Medical Decision Making     Medications   diphenhydrAMINE (BENADRYL) tablet 50 mg (50 mg Oral Given 8/22/18 0226)   dexamethasone (DECADRON) injection 10 mg (10 mg Oral Given 8/22/18 0226)        Consults:   None    Procedures:   Dental block was provided by me using 6 mL of lidocaine with epinephrine. recognition software. Every effort was made to ensure accuracy; however, inadvertent computerized transcription errors may be present.   END OF ED PROVIDER NOTE       Sigrid Pryor PA-C  08/22/18 0890

## 2018-09-14 ENCOUNTER — HOSPITAL ENCOUNTER (EMERGENCY)
Age: 48
Discharge: HOME | End: 2018-09-14
Attending: EMERGENCY MEDICINE
Payer: COMMERCIAL

## 2018-09-14 ENCOUNTER — APPOINTMENT (OUTPATIENT)
Dept: CT IMAGING | Age: 48
End: 2018-09-14
Payer: COMMERCIAL

## 2018-09-14 VITALS
HEART RATE: 75 BPM | DIASTOLIC BLOOD PRESSURE: 89 MMHG | SYSTOLIC BLOOD PRESSURE: 135 MMHG | OXYGEN SATURATION: 98 % | WEIGHT: 180 LBS | RESPIRATION RATE: 18 BRPM | TEMPERATURE: 98.3 F | BODY MASS INDEX: 24.41 KG/M2

## 2018-09-14 DIAGNOSIS — T14.8XXA STAB WOUND: Primary | ICD-10-CM

## 2018-09-14 LAB
ALBUMIN SERPL-MCNC: 4.1 G/DL (ref 3.5–5.2)
ALP BLD-CCNC: 61 U/L (ref 40–129)
ALT SERPL-CCNC: 25 U/L (ref 0–40)
ANION GAP SERPL CALCULATED.3IONS-SCNC: 13 MMOL/L (ref 7–16)
AST SERPL-CCNC: 28 U/L (ref 0–39)
BILIRUB SERPL-MCNC: 0.4 MG/DL (ref 0–1.2)
BUN BLDV-MCNC: 20 MG/DL (ref 6–20)
CALCIUM SERPL-MCNC: 8.9 MG/DL (ref 8.6–10.2)
CHLORIDE BLD-SCNC: 103 MMOL/L (ref 98–107)
CO2: 22 MMOL/L (ref 22–29)
CREAT SERPL-MCNC: 1 MG/DL (ref 0.7–1.2)
GFR AFRICAN AMERICAN: >60
GFR NON-AFRICAN AMERICAN: >60 ML/MIN/1.73
GLUCOSE BLD-MCNC: 97 MG/DL (ref 74–109)
HCT VFR BLD CALC: 40.3 % (ref 37–54)
HEMOGLOBIN: 13.3 G/DL (ref 12.5–16.5)
MCH RBC QN AUTO: 29.2 PG (ref 26–35)
MCHC RBC AUTO-ENTMCNC: 33 % (ref 32–34.5)
MCV RBC AUTO: 88.6 FL (ref 80–99.9)
PDW BLD-RTO: 13.6 FL (ref 11.5–15)
PLATELET # BLD: 231 E9/L (ref 130–450)
PMV BLD AUTO: 10.6 FL (ref 7–12)
POTASSIUM SERPL-SCNC: 4.5 MMOL/L (ref 3.5–5)
RBC # BLD: 4.55 E12/L (ref 3.8–5.8)
SODIUM BLD-SCNC: 138 MMOL/L (ref 132–146)
TOTAL PROTEIN: 7.4 G/DL (ref 6.4–8.3)
WBC # BLD: 13 E9/L (ref 4.5–11.5)

## 2018-09-14 PROCEDURE — 12001 RPR S/N/AX/GEN/TRNK 2.5CM/<: CPT

## 2018-09-14 PROCEDURE — 6360000004 HC RX CONTRAST MEDICATION: Performed by: RADIOLOGY

## 2018-09-14 PROCEDURE — 2500000003 HC RX 250 WO HCPCS

## 2018-09-14 PROCEDURE — 85027 COMPLETE CBC AUTOMATED: CPT

## 2018-09-14 PROCEDURE — 99283 EMERGENCY DEPT VISIT LOW MDM: CPT

## 2018-09-14 PROCEDURE — 36415 COLL VENOUS BLD VENIPUNCTURE: CPT

## 2018-09-14 PROCEDURE — 73706 CT ANGIO LWR EXTR W/O&W/DYE: CPT

## 2018-09-14 PROCEDURE — 6360000002 HC RX W HCPCS: Performed by: EMERGENCY MEDICINE

## 2018-09-14 PROCEDURE — 2580000003 HC RX 258: Performed by: EMERGENCY MEDICINE

## 2018-09-14 PROCEDURE — 80053 COMPREHEN METABOLIC PANEL: CPT

## 2018-09-14 PROCEDURE — 6370000000 HC RX 637 (ALT 250 FOR IP)

## 2018-09-14 RX ORDER — LIDOCAINE HYDROCHLORIDE AND EPINEPHRINE 10; 10 MG/ML; UG/ML
INJECTION, SOLUTION INFILTRATION; PERINEURAL
Status: COMPLETED
Start: 2018-09-14 | End: 2018-09-14

## 2018-09-14 RX ORDER — 0.9 % SODIUM CHLORIDE 0.9 %
500 INTRAVENOUS SOLUTION INTRAVENOUS ONCE
Status: COMPLETED | OUTPATIENT
Start: 2018-09-14 | End: 2018-09-14

## 2018-09-14 RX ORDER — OXYCODONE HYDROCHLORIDE AND ACETAMINOPHEN 5; 325 MG/1; MG/1
1 TABLET ORAL ONCE
Status: COMPLETED | OUTPATIENT
Start: 2018-09-14 | End: 2018-09-14

## 2018-09-14 RX ORDER — CEPHALEXIN 500 MG/1
500 CAPSULE ORAL 4 TIMES DAILY
Qty: 40 CAPSULE | Refills: 0 | Status: SHIPPED | OUTPATIENT
Start: 2018-09-14 | End: 2018-09-24

## 2018-09-14 RX ORDER — OXYCODONE HYDROCHLORIDE AND ACETAMINOPHEN 5; 325 MG/1; MG/1
TABLET ORAL
Status: COMPLETED
Start: 2018-09-14 | End: 2018-09-14

## 2018-09-14 RX ADMIN — IOPAMIDOL 100 ML: 755 INJECTION, SOLUTION INTRAVENOUS at 20:39

## 2018-09-14 RX ADMIN — OXYCODONE HYDROCHLORIDE AND ACETAMINOPHEN 1 TABLET: 5; 325 TABLET ORAL at 21:56

## 2018-09-14 RX ADMIN — SODIUM CHLORIDE 500 ML: 9 INJECTION, SOLUTION INTRAVENOUS at 19:13

## 2018-09-14 RX ADMIN — LIDOCAINE HYDROCHLORIDE,EPINEPHRINE BITARTRATE: 10; .01 INJECTION, SOLUTION INFILTRATION; PERINEURAL at 19:00

## 2018-09-14 RX ADMIN — CEFAZOLIN SODIUM 1 G: 1 SOLUTION INTRAVENOUS at 19:11

## 2018-09-14 ASSESSMENT — PAIN SCALES - GENERAL
PAINLEVEL_OUTOF10: 10
PAINLEVEL_OUTOF10: 8
PAINLEVEL_OUTOF10: 10

## 2018-09-14 ASSESSMENT — PAIN DESCRIPTION - PAIN TYPE: TYPE: ACUTE PAIN

## 2018-09-14 ASSESSMENT — PAIN DESCRIPTION - ORIENTATION: ORIENTATION: RIGHT

## 2018-09-14 ASSESSMENT — PAIN DESCRIPTION - LOCATION: LOCATION: LEG
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
## 2018-09-15 NOTE — ED NOTES
Wound care provided to RLE. Cleansed and covered with DSD and ACE wrap.  Patient tolerated well     Annetta Rouse RN  09/14/18 8246

## 2018-09-29 ENCOUNTER — APPOINTMENT (OUTPATIENT)
Dept: GENERAL RADIOLOGY | Age: 48
End: 2018-09-29
Payer: COMMERCIAL

## 2018-09-29 LAB
EKG ATRIAL RATE: 78 BPM
EKG P AXIS: 77 DEGREES
EKG P-R INTERVAL: 132 MS
EKG Q-T INTERVAL: 386 MS
EKG QRS DURATION: 88 MS
EKG QTC CALCULATION (BAZETT): 440 MS
EKG R AXIS: 90 DEGREES
EKG T AXIS: 67 DEGREES
EKG VENTRICULAR RATE: 78 BPM

## 2018-09-29 PROCEDURE — 71046 X-RAY EXAM CHEST 2 VIEWS: CPT

## 2018-09-29 PROCEDURE — 93005 ELECTROCARDIOGRAM TRACING: CPT | Performed by: NURSE PRACTITIONER

## 2018-09-29 ASSESSMENT — PAIN DESCRIPTION - LOCATION: LOCATION: CHEST

## 2018-09-29 ASSESSMENT — PAIN DESCRIPTION - PAIN TYPE: TYPE: ACUTE PAIN

## 2018-09-29 ASSESSMENT — PAIN SCALES - GENERAL: PAINLEVEL_OUTOF10: 8

## 2018-09-29 ASSESSMENT — PAIN DESCRIPTION - DESCRIPTORS: DESCRIPTORS: TIGHTNESS

## 2018-09-30 ENCOUNTER — HOSPITAL ENCOUNTER (OUTPATIENT)
Age: 48
Setting detail: OBSERVATION
Discharge: HOME | End: 2018-10-02
Attending: EMERGENCY MEDICINE | Admitting: INTERNAL MEDICINE
Payer: COMMERCIAL

## 2018-09-30 DIAGNOSIS — R07.9 CHEST PAIN, UNSPECIFIED TYPE: Primary | ICD-10-CM

## 2018-09-30 LAB
ACETAMINOPHEN LEVEL: <5 MCG/ML (ref 10–30)
ALBUMIN SERPL-MCNC: 4.2 G/DL (ref 3.5–5.2)
ALP BLD-CCNC: 70 U/L (ref 40–129)
ALT SERPL-CCNC: 10 U/L (ref 0–40)
AMPHETAMINE SCREEN, URINE: NOT DETECTED
ANION GAP SERPL CALCULATED.3IONS-SCNC: 11 MMOL/L (ref 7–16)
AST SERPL-CCNC: 20 U/L (ref 0–39)
BACTERIA: ABNORMAL /HPF
BARBITURATE SCREEN URINE: NOT DETECTED
BASOPHILS ABSOLUTE: 0.06 E9/L (ref 0–0.2)
BASOPHILS RELATIVE PERCENT: 0.6 % (ref 0–2)
BENZODIAZEPINE SCREEN, URINE: NOT DETECTED
BILIRUB SERPL-MCNC: 0.3 MG/DL (ref 0–1.2)
BILIRUBIN URINE: NEGATIVE
BLOOD, URINE: NEGATIVE
BUN BLDV-MCNC: 17 MG/DL (ref 6–20)
CALCIUM SERPL-MCNC: 9.4 MG/DL (ref 8.6–10.2)
CANNABINOID SCREEN URINE: NOT DETECTED
CHLORIDE BLD-SCNC: 102 MMOL/L (ref 98–107)
CK MB: 3.1 NG/ML (ref 0–7.7)
CK MB: 3.5 NG/ML (ref 0–7.7)
CLARITY: CLEAR
CO2: 26 MMOL/L (ref 22–29)
COCAINE METABOLITE SCREEN URINE: POSITIVE
COLOR: YELLOW
CREAT SERPL-MCNC: 1.1 MG/DL (ref 0.7–1.2)
EOSINOPHILS ABSOLUTE: 0.22 E9/L (ref 0.05–0.5)
EOSINOPHILS RELATIVE PERCENT: 2.1 % (ref 0–6)
ETHANOL: <10 MG/DL (ref 0–0.08)
GFR AFRICAN AMERICAN: >60
GFR NON-AFRICAN AMERICAN: >60 ML/MIN/1.73
GLUCOSE BLD-MCNC: 84 MG/DL (ref 74–109)
GLUCOSE URINE: NEGATIVE MG/DL
HCT VFR BLD CALC: 40.4 % (ref 37–54)
HEMOGLOBIN: 13.1 G/DL (ref 12.5–16.5)
IMMATURE GRANULOCYTES #: 0.05 E9/L
IMMATURE GRANULOCYTES %: 0.5 % (ref 0–5)
KETONES, URINE: NEGATIVE MG/DL
LEUKOCYTE ESTERASE, URINE: ABNORMAL
LYMPHOCYTES ABSOLUTE: 2.78 E9/L (ref 1.5–4)
LYMPHOCYTES RELATIVE PERCENT: 26.4 % (ref 20–42)
MAGNESIUM: 2.1 MG/DL (ref 1.6–2.6)
MCH RBC QN AUTO: 28.6 PG (ref 26–35)
MCHC RBC AUTO-ENTMCNC: 32.4 % (ref 32–34.5)
MCV RBC AUTO: 88.2 FL (ref 80–99.9)
METHADONE SCREEN, URINE: NOT DETECTED
MONOCYTES ABSOLUTE: 0.87 E9/L (ref 0.1–0.95)
MONOCYTES RELATIVE PERCENT: 8.3 % (ref 2–12)
NEUTROPHILS ABSOLUTE: 6.56 E9/L (ref 1.8–7.3)
NEUTROPHILS RELATIVE PERCENT: 62.1 % (ref 43–80)
NITRITE, URINE: NEGATIVE
OPIATE SCREEN URINE: NOT DETECTED
PDW BLD-RTO: 13.6 FL (ref 11.5–15)
PH UA: 6.5 (ref 5–9)
PHENCYCLIDINE SCREEN URINE: NOT DETECTED
PLATELET # BLD: 376 E9/L (ref 130–450)
PMV BLD AUTO: 9.5 FL (ref 7–12)
POTASSIUM SERPL-SCNC: 4.1 MMOL/L (ref 3.5–5)
PRO-BNP: 66 PG/ML (ref 0–125)
PROPOXYPHENE SCREEN: NOT DETECTED
PROTEIN UA: NEGATIVE MG/DL
RBC # BLD: 4.58 E12/L (ref 3.8–5.8)
RBC UA: ABNORMAL /HPF (ref 0–2)
SALICYLATE, SERUM: <0.3 MG/DL (ref 0–30)
SODIUM BLD-SCNC: 139 MMOL/L (ref 132–146)
SPECIFIC GRAVITY UA: 1.01 (ref 1–1.03)
TOTAL CK: 136 U/L (ref 20–200)
TOTAL CK: 161 U/L (ref 20–200)
TOTAL PROTEIN: 7.6 G/DL (ref 6.4–8.3)
TRICYCLIC ANTIDEPRESSANTS SCREEN SERUM: NEGATIVE NG/ML
TROPONIN: <0.01 NG/ML (ref 0–0.03)
UROBILINOGEN, URINE: 0.2 E.U./DL
WBC # BLD: 10.5 E9/L (ref 4.5–11.5)
WBC UA: ABNORMAL /HPF (ref 0–5)

## 2018-09-30 PROCEDURE — 6370000000 HC RX 637 (ALT 250 FOR IP): Performed by: EMERGENCY MEDICINE

## 2018-09-30 PROCEDURE — G0378 HOSPITAL OBSERVATION PER HR: HCPCS

## 2018-09-30 PROCEDURE — 85025 COMPLETE CBC W/AUTO DIFF WBC: CPT

## 2018-09-30 PROCEDURE — 6370000000 HC RX 637 (ALT 250 FOR IP): Performed by: INTERNAL MEDICINE

## 2018-09-30 PROCEDURE — 36415 COLL VENOUS BLD VENIPUNCTURE: CPT

## 2018-09-30 PROCEDURE — 83880 ASSAY OF NATRIURETIC PEPTIDE: CPT

## 2018-09-30 PROCEDURE — 83735 ASSAY OF MAGNESIUM: CPT

## 2018-09-30 PROCEDURE — 2580000003 HC RX 258: Performed by: INTERNAL MEDICINE

## 2018-09-30 PROCEDURE — 84484 ASSAY OF TROPONIN QUANT: CPT

## 2018-09-30 PROCEDURE — APPSS60 APP SPLIT SHARED TIME 46-60 MINUTES: Performed by: NURSE PRACTITIONER

## 2018-09-30 PROCEDURE — G0480 DRUG TEST DEF 1-7 CLASSES: HCPCS

## 2018-09-30 PROCEDURE — 81001 URINALYSIS AUTO W/SCOPE: CPT

## 2018-09-30 PROCEDURE — 80307 DRUG TEST PRSMV CHEM ANLYZR: CPT

## 2018-09-30 PROCEDURE — 99253 IP/OBS CNSLTJ NEW/EST LOW 45: CPT | Performed by: INTERNAL MEDICINE

## 2018-09-30 PROCEDURE — 82553 CREATINE MB FRACTION: CPT

## 2018-09-30 PROCEDURE — 82550 ASSAY OF CK (CPK): CPT

## 2018-09-30 PROCEDURE — 6370000000 HC RX 637 (ALT 250 FOR IP): Performed by: NURSE PRACTITIONER

## 2018-09-30 PROCEDURE — 99285 EMERGENCY DEPT VISIT HI MDM: CPT

## 2018-09-30 PROCEDURE — 80053 COMPREHEN METABOLIC PANEL: CPT

## 2018-09-30 RX ORDER — ATORVASTATIN CALCIUM 20 MG/1
20 TABLET, FILM COATED ORAL NIGHTLY
Status: DISCONTINUED | OUTPATIENT
Start: 2018-09-30 | End: 2018-09-30

## 2018-09-30 RX ORDER — ASPIRIN 81 MG/1
324 TABLET, CHEWABLE ORAL ONCE
Status: COMPLETED | OUTPATIENT
Start: 2018-09-30 | End: 2018-09-30

## 2018-09-30 RX ORDER — DOCUSATE SODIUM 100 MG/1
100 CAPSULE, LIQUID FILLED ORAL NIGHTLY
Status: DISCONTINUED | OUTPATIENT
Start: 2018-09-30 | End: 2018-10-02 | Stop reason: HOSPADM

## 2018-09-30 RX ORDER — NITROGLYCERIN 0.4 MG/1
0.8 TABLET SUBLINGUAL
Status: ACTIVE | OUTPATIENT
Start: 2018-10-01 | End: 2018-10-02

## 2018-09-30 RX ORDER — SODIUM CHLORIDE 0.9 % (FLUSH) 0.9 %
10 SYRINGE (ML) INJECTION EVERY 12 HOURS SCHEDULED
Status: DISCONTINUED | OUTPATIENT
Start: 2018-09-30 | End: 2018-09-30 | Stop reason: SDUPTHER

## 2018-09-30 RX ORDER — ASPIRIN 81 MG/1
81 TABLET, CHEWABLE ORAL DAILY
Status: DISCONTINUED | OUTPATIENT
Start: 2018-10-01 | End: 2018-10-02 | Stop reason: HOSPADM

## 2018-09-30 RX ORDER — METOPROLOL TARTRATE 50 MG/1
50 TABLET, FILM COATED ORAL
Status: ACTIVE | OUTPATIENT
Start: 2018-09-30 | End: 2018-09-30

## 2018-09-30 RX ORDER — SODIUM CHLORIDE 0.9 % (FLUSH) 0.9 %
10 SYRINGE (ML) INJECTION PRN
Status: DISCONTINUED | OUTPATIENT
Start: 2018-09-30 | End: 2018-10-02 | Stop reason: HOSPADM

## 2018-09-30 RX ORDER — NITROGLYCERIN 0.4 MG/1
0.8 TABLET SUBLINGUAL ONCE
Status: DISCONTINUED | OUTPATIENT
Start: 2018-09-30 | End: 2018-09-30 | Stop reason: SDUPTHER

## 2018-09-30 RX ORDER — ATORVASTATIN CALCIUM 40 MG/1
40 TABLET, FILM COATED ORAL NIGHTLY
Status: DISCONTINUED | OUTPATIENT
Start: 2018-09-30 | End: 2018-10-02 | Stop reason: HOSPADM

## 2018-09-30 RX ORDER — ONDANSETRON 2 MG/ML
4 INJECTION INTRAMUSCULAR; INTRAVENOUS EVERY 6 HOURS PRN
Status: DISCONTINUED | OUTPATIENT
Start: 2018-09-30 | End: 2018-10-02 | Stop reason: HOSPADM

## 2018-09-30 RX ORDER — HEPARIN SODIUM 10000 [USP'U]/ML
5000 INJECTION, SOLUTION INTRAVENOUS; SUBCUTANEOUS EVERY 8 HOURS
Status: DISCONTINUED | OUTPATIENT
Start: 2018-09-30 | End: 2018-09-30

## 2018-09-30 RX ORDER — NICOTINE 21 MG/24HR
1 PATCH, TRANSDERMAL 24 HOURS TRANSDERMAL DAILY
Status: DISCONTINUED | OUTPATIENT
Start: 2018-09-30 | End: 2018-10-02 | Stop reason: HOSPADM

## 2018-09-30 RX ORDER — SODIUM CHLORIDE 0.9 % (FLUSH) 0.9 %
10 SYRINGE (ML) INJECTION PRN
Status: DISCONTINUED | OUTPATIENT
Start: 2018-09-30 | End: 2018-09-30 | Stop reason: SDUPTHER

## 2018-09-30 RX ORDER — ACETAMINOPHEN 325 MG/1
650 TABLET ORAL EVERY 4 HOURS PRN
Status: DISCONTINUED | OUTPATIENT
Start: 2018-09-30 | End: 2018-10-02 | Stop reason: HOSPADM

## 2018-09-30 RX ORDER — NITROGLYCERIN 0.4 MG/1
0.4 TABLET SUBLINGUAL EVERY 5 MIN PRN
Status: DISCONTINUED | OUTPATIENT
Start: 2018-09-30 | End: 2018-10-02 | Stop reason: HOSPADM

## 2018-09-30 RX ORDER — SODIUM CHLORIDE 0.9 % (FLUSH) 0.9 %
10 SYRINGE (ML) INJECTION EVERY 12 HOURS SCHEDULED
Status: DISCONTINUED | OUTPATIENT
Start: 2018-09-30 | End: 2018-10-02 | Stop reason: HOSPADM

## 2018-09-30 RX ORDER — ACETAMINOPHEN 325 MG/1
650 TABLET ORAL EVERY 4 HOURS PRN
Status: DISCONTINUED | OUTPATIENT
Start: 2018-09-30 | End: 2018-09-30 | Stop reason: SDUPTHER

## 2018-09-30 RX ADMIN — ASPIRIN 81 MG CHEWABLE TABLET 324 MG: 81 TABLET CHEWABLE at 00:39

## 2018-09-30 RX ADMIN — ATORVASTATIN CALCIUM 40 MG: 40 TABLET, FILM COATED ORAL at 20:10

## 2018-09-30 RX ADMIN — Medication 10 ML: at 20:10

## 2018-09-30 RX ADMIN — ACETAMINOPHEN 650 MG: 325 TABLET, FILM COATED ORAL at 20:16

## 2018-09-30 RX ADMIN — Medication 10 ML: at 09:46

## 2018-09-30 ASSESSMENT — PAIN DESCRIPTION - LOCATION: LOCATION: CHEST

## 2018-09-30 ASSESSMENT — PAIN DESCRIPTION - DESCRIPTORS: DESCRIPTORS: ACHING;DISCOMFORT

## 2018-09-30 ASSESSMENT — PAIN DESCRIPTION - FREQUENCY: FREQUENCY: INTERMITTENT

## 2018-09-30 ASSESSMENT — PAIN SCALES - GENERAL
PAINLEVEL_OUTOF10: 0
PAINLEVEL_OUTOF10: 2

## 2018-09-30 ASSESSMENT — PAIN DESCRIPTION - PROGRESSION: CLINICAL_PROGRESSION: NOT CHANGED

## 2018-09-30 ASSESSMENT — PAIN DESCRIPTION - PAIN TYPE: TYPE: ACUTE PAIN

## 2018-09-30 ASSESSMENT — PAIN DESCRIPTION - ONSET: ONSET: ON-GOING

## 2018-09-30 NOTE — ED PROVIDER NOTES
Department of Emergency Medicine   ED  Provider Note  Admit Date/RoomTime: 9/30/2018 12:05 AM  ED Room: 16/16      History of Present Illness:  9/30/18, Time: 12:07 AM       Christian Ruiz is a 50 y.o. male presenting to the ED for chest pain, beginning today. The complaint has been intermittent, moderate in severity, and worsened by nothing. Pt was seen in July for same. Reports that he had a sudden onset of chest pain while at work today that he describes as tight in nature and radiates into his left shoulder. Reports when present it lasts for about 2-3 minutes and makes him short of breath. Denies any pain now. Notes that he has been coughing for the past month. Pt is a smoker. Patient reports no weakness, numbness, abdominal pain, nausea, vomiting or diarrhea. Patient reports no fever, chills, congestion or any other further symptoms at this time. Patient reports family history of heart disease    Review of Systems:   Pertinent positives and negatives are stated within HPI, all other systems reviewed and are negative.    --------------------------------------------- PAST HISTORY ---------------------------------------------  Past Medical History:  has a past medical history of GERD (gastroesophageal reflux disease). Past Surgical History:  has no past surgical history on file. Social History:  reports that he has been smoking. He has a 30.00 pack-year smoking history. He has never used smokeless tobacco. He reports that he drinks alcohol. He reports that he does not use drugs. Family History: family history includes Diabetes in his father and mother; Heart Disease in his father and mother; High Blood Pressure in his father and mother; High Cholesterol in his father and mother. The patients home medications have been reviewed. Allergies: Patient has no known allergies.       ---------------------------------------------------PHYSICAL

## 2018-09-30 NOTE — PROCEDURES
Sent for patient to come to echo through transport at 1:40 pm, patient not in his room. Transporter waited 10 mins and left.  Will attempt again tomorrow 10/1

## 2018-09-30 NOTE — H&P
Hospital Medicine History & Physical      PCP: Nayely Khan DO    Date of Admission: 9/30/2018    Date of Service:  Placed in Observation. Chief Complaint:  Chest pain      History Of Present Illness:     50 y.o. male who presented to [facility] with chest pain, had a NEG stress in July. He said the chest pain started again on yesterday, sharp in character, located ACW radiating to her left shoulder, lasting for 1-2 minutes, he was SOB, no NV, no diaphoresis. Past Medical History:          Diagnosis Date    GERD (gastroesophageal reflux disease)        Past Surgical History:      History reviewed. No pertinent surgical history. Medications Prior to Admission:      Prior to Admission medications    Medication Sig Start Date End Date Taking? Authorizing Provider   benzocaine (ORAJEL) 10 % mucosal gel Take by mouth as needed. 8/22/18   Murvin Hodgkins, PA-C       Allergies:  Patient has no known allergies. Social History:      The patient currently lives  alone    TOBACCO:   reports that he has been smoking. He has a 30.00 pack-year smoking history. He has never used smokeless tobacco.  ETOH:   reports that he drinks alcohol. Family History:       Reviewed in detail and negative for DM, CAD, Cancer, CVA. Positive as follows:    Family History   Problem Relation Age of Onset    Diabetes Mother     Heart Disease Mother     High Blood Pressure Mother     High Cholesterol Mother     Diabetes Father     Heart Disease Father     High Blood Pressure Father     High Cholesterol Father        REVIEW OF SYSTEMS:   Pertinent positives as noted in the HPI. All other systems reviewed and negative.     PHYSICAL EXAM:    /64   Pulse 59   Temp 98.4 °F (36.9 °C)   Resp 16   Ht 6' (1.829 m)   Wt 180 lb (81.6 kg)   SpO2 97%   BMI 24.41 kg/m²     General appearance:  No

## 2018-10-01 ENCOUNTER — APPOINTMENT (OUTPATIENT)
Dept: CT IMAGING | Age: 48
End: 2018-10-01
Payer: COMMERCIAL

## 2018-10-01 LAB
ANION GAP SERPL CALCULATED.3IONS-SCNC: 12 MMOL/L (ref 7–16)
BUN BLDV-MCNC: 14 MG/DL (ref 6–20)
CALCIUM SERPL-MCNC: 8.4 MG/DL (ref 8.6–10.2)
CHLORIDE BLD-SCNC: 103 MMOL/L (ref 98–107)
CHOLESTEROL, TOTAL: 127 MG/DL (ref 0–199)
CO2: 26 MMOL/L (ref 22–29)
CREAT SERPL-MCNC: 1.1 MG/DL (ref 0.7–1.2)
GFR AFRICAN AMERICAN: >60
GFR NON-AFRICAN AMERICAN: >60 ML/MIN/1.73
GLUCOSE BLD-MCNC: 94 MG/DL (ref 74–109)
HDLC SERPL-MCNC: 32 MG/DL
LDL CHOLESTEROL CALCULATED: 73 MG/DL (ref 0–99)
LV EF: 63 %
LVEF MODALITY: NORMAL
POTASSIUM REFLEX MAGNESIUM: 4 MMOL/L (ref 3.5–5)
SODIUM BLD-SCNC: 141 MMOL/L (ref 132–146)
TRIGL SERPL-MCNC: 109 MG/DL (ref 0–149)
VLDLC SERPL CALC-MCNC: 22 MG/DL

## 2018-10-01 PROCEDURE — 6370000000 HC RX 637 (ALT 250 FOR IP): Performed by: INTERNAL MEDICINE

## 2018-10-01 PROCEDURE — G0378 HOSPITAL OBSERVATION PER HR: HCPCS

## 2018-10-01 PROCEDURE — 2580000003 HC RX 258: Performed by: INTERNAL MEDICINE

## 2018-10-01 PROCEDURE — 80048 BASIC METABOLIC PNL TOTAL CA: CPT

## 2018-10-01 PROCEDURE — 6370000000 HC RX 637 (ALT 250 FOR IP): Performed by: NURSE PRACTITIONER

## 2018-10-01 PROCEDURE — 6360000004 HC RX CONTRAST MEDICATION: Performed by: RADIOLOGY

## 2018-10-01 PROCEDURE — 99232 SBSQ HOSP IP/OBS MODERATE 35: CPT | Performed by: INTERNAL MEDICINE

## 2018-10-01 PROCEDURE — 36415 COLL VENOUS BLD VENIPUNCTURE: CPT

## 2018-10-01 PROCEDURE — 93306 TTE W/DOPPLER COMPLETE: CPT

## 2018-10-01 PROCEDURE — 80061 LIPID PANEL: CPT

## 2018-10-01 PROCEDURE — 2580000003 HC RX 258: Performed by: RADIOLOGY

## 2018-10-01 PROCEDURE — 75574 CT ANGIO HRT W/3D IMAGE: CPT

## 2018-10-01 RX ORDER — ATORVASTATIN CALCIUM 40 MG/1
40 TABLET, FILM COATED ORAL NIGHTLY
Qty: 30 TABLET | Refills: 3 | Status: ON HOLD | OUTPATIENT
Start: 2018-10-01 | End: 2019-01-05

## 2018-10-01 RX ORDER — ASPIRIN 81 MG/1
81 TABLET, CHEWABLE ORAL DAILY
Qty: 30 TABLET | Refills: 3 | Status: SHIPPED | OUTPATIENT
Start: 2018-10-02 | End: 2019-01-04

## 2018-10-01 RX ORDER — METOPROLOL TARTRATE 50 MG/1
100 TABLET, FILM COATED ORAL ONCE
Status: DISCONTINUED | OUTPATIENT
Start: 2018-10-01 | End: 2018-10-02

## 2018-10-01 RX ORDER — SODIUM CHLORIDE 9 MG/ML
INJECTION, SOLUTION INTRAVENOUS ONCE
Status: COMPLETED | OUTPATIENT
Start: 2018-10-01 | End: 2018-10-01

## 2018-10-01 RX ORDER — METOPROLOL TARTRATE 50 MG/1
50 TABLET, FILM COATED ORAL
Status: ACTIVE | OUTPATIENT
Start: 2018-10-01 | End: 2018-10-01

## 2018-10-01 RX ORDER — NITROGLYCERIN 0.4 MG/1
TABLET SUBLINGUAL
Qty: 25 TABLET | Refills: 3 | Status: ON HOLD | OUTPATIENT
Start: 2018-10-01 | End: 2019-01-05

## 2018-10-01 RX ADMIN — ASPIRIN 81 MG CHEWABLE TABLET 81 MG: 81 TABLET CHEWABLE at 08:44

## 2018-10-01 RX ADMIN — ATORVASTATIN CALCIUM 40 MG: 40 TABLET, FILM COATED ORAL at 21:29

## 2018-10-01 RX ADMIN — NITROGLYCERIN 0.4 MG: 0.4 TABLET SUBLINGUAL at 14:17

## 2018-10-01 RX ADMIN — DOCUSATE SODIUM 100 MG: 100 CAPSULE, LIQUID FILLED ORAL at 21:34

## 2018-10-01 RX ADMIN — ACETAMINOPHEN 650 MG: 325 TABLET, FILM COATED ORAL at 21:33

## 2018-10-01 RX ADMIN — IOPAMIDOL 100 ML: 755 INJECTION, SOLUTION INTRAVENOUS at 14:10

## 2018-10-01 RX ADMIN — SODIUM CHLORIDE: 9 INJECTION, SOLUTION INTRAVENOUS at 11:20

## 2018-10-01 RX ADMIN — Medication 10 ML: at 21:29

## 2018-10-01 ASSESSMENT — PAIN SCALES - GENERAL: PAINLEVEL_OUTOF10: 6

## 2018-10-01 NOTE — PROGRESS NOTES
8969    nitroGLYCERIN (NITROSTAT) SL tablet 0.4 mg  0.4 mg Sublingual Q5 Min PRN Elpidio Trang Crow MD        docusate sodium (COLACE) capsule 100 mg  100 mg Oral Nightly Philippe Stephens DO        enoxaparin (LOVENOX) injection 40 mg  40 mg Subcutaneous Daily Philippe Stephens DO        atorvastatin (LIPITOR) tablet 40 mg  40 mg Oral Nightly Rhae Diver, APRN - CNP   40 mg at 09/30/18 2010    perflutren lipid microspheres (DEFINITY) injection 1.65 mg  1.5 mL Intravenous ONCE PRN Rhae Diver, APRN - CNP        nicotine (NICODERM CQ) 21 MG/24HR 1 patch  1 patch Transdermal Daily Philippe Stephens DO   1 patch at 10/01/18 0845    nitroGLYCERIN (NITROSTAT) SL tablet 0.8 mg  0.8 mg Sublingual On Call Rhae Diver, APRN - CNP             Physical Exam:  /60   Pulse 76   Temp 98 °F (36.7 °C) (Temporal)   Resp 18   Ht 6' (1.829 m)   Wt 180 lb (81.6 kg)   SpO2 97%   BMI 24.41 kg/m²   Wt Readings from Last 3 Encounters:   09/30/18 180 lb (81.6 kg)   09/14/18 180 lb (81.6 kg)   08/22/18 180 lb (81.6 kg)     Appearance: Awake, alert, no acute respiratory distress  Skin: Intact, no rash  Head: Normocephalic, atraumatic  Eyes: EOMI, no conjunctival erythema  ENMT: No pharyngeal erythema, MMM, no rhinorrhea  Neck: Supple, no elevated JVP, no carotid bruits  Lungs: Clear to auscultation bilaterally. No wheezes, rales, or rhonchi. Cardiac: Regular rate and rhythm, +S1S2, no murmurs apparent  Abdomen: Soft, nontender, +bowel sounds  Extremities: Moves all extremities x 4, no lower extremity edema  Neurologic: No focal motor deficits apparent, normal mood and affect  Peripheral Pulses: Intact posterior tibial pulses bilaterally    Intake/Output:    Intake/Output Summary (Last 24 hours) at 10/01/18 1235  Last data filed at 10/01/18 0944   Gross per 24 hour   Intake                0 ml   Output                0 ml   Net                0 ml     No intake/output data recorded.     Laboratory Stress test: 7/18/2018. 1. Maximum target heart rate not achieved, limiting evaluation for   exercise-induced perfusion defect. 2. Within the above limitation, no exercise-induced perfusion defect   identified on the stress or rest images. 3. Slightly hypokinetic motion of the inferior and septal walls. 4. Left ventricular ejection fraction of 49 %                                       Lab data: BMP normal, total cholesterol 127, HDL 32, LDL 73, triglycerides 109, troponins ×3 are negative, CBCnormal  Blood pressure 110/60. Assessment:  1. Typical chest pain, history of recent equivocal stress test, now with a negative troponins and no EKG changes. The pain he had yesterday was nonanginal pain  2. Tobacco use disorder  3. Remote history of  cocaine use  4. Family history of premature coronary artery disease  5. ASVD score of 5.6%     Plan:  1. He scheduled for a coronary CTA with a calcium score to further evaluate his chest pain. 2. Continue Lipitor and we'll decrease the dose to 20 mg due to near normal LDL level. 3. He was advised to quit smoking  4. We will follow echocardiogram to evaluate LV function since his LV function was slightly decreased on the stress test with some wall motion abnormalities. 5. Continue aspirin 81 mg by mouth daily at this time. We'll discontinue aspirin and coronary CTA shows no evidence of coronary calcium otherwise he should continue both statin and aspirin. 6. If CTA of the coronaries comes back negative then he's stable for discharge from the cardiology point of view. Laurence Bettencourt MD., [facility] [place].   [facility]) Cardiology

## 2018-10-02 VITALS
WEIGHT: 180 LBS | BODY MASS INDEX: 24.38 KG/M2 | HEART RATE: 68 BPM | OXYGEN SATURATION: 97 % | TEMPERATURE: 98.2 F | RESPIRATION RATE: 16 BRPM | HEIGHT: 72 IN | DIASTOLIC BLOOD PRESSURE: 60 MMHG | SYSTOLIC BLOOD PRESSURE: 108 MMHG

## 2018-10-02 PROCEDURE — 6370000000 HC RX 637 (ALT 250 FOR IP): Performed by: INTERNAL MEDICINE

## 2018-10-02 PROCEDURE — 2580000003 HC RX 258: Performed by: INTERNAL MEDICINE

## 2018-10-02 PROCEDURE — 99232 SBSQ HOSP IP/OBS MODERATE 35: CPT | Performed by: INTERNAL MEDICINE

## 2018-10-02 PROCEDURE — G0378 HOSPITAL OBSERVATION PER HR: HCPCS

## 2018-10-02 RX ADMIN — Medication 10 ML: at 09:39

## 2018-10-02 RX ADMIN — ASPIRIN 81 MG CHEWABLE TABLET 81 MG: 81 TABLET CHEWABLE at 09:34

## 2018-10-02 ASSESSMENT — PAIN SCALES - GENERAL
PAINLEVEL_OUTOF10: 0
PAINLEVEL_OUTOF10: 0

## 2018-10-02 NOTE — PROGRESS NOTES
 nitroGLYCERIN (NITROSTAT) SL tablet 0.4 mg  0.4 mg Sublingual Q5 Min PRN Elpidio Wren MD   0.4 mg at 10/01/18 1417    docusate sodium (COLACE) capsule 100 mg  100 mg Oral Nightly Philippe Menendez DO   100 mg at 10/01/18 2134    enoxaparin (LOVENOX) injection 40 mg  40 mg Subcutaneous Daily Philippe Menendez DO        atorvastatin (LIPITOR) tablet 40 mg  40 mg Oral Nightly Tamsen Jeromy, APRN - CNP   40 mg at 10/01/18 2129    perflutren lipid microspheres (DEFINITY) injection 1.65 mg  1.5 mL Intravenous ONCE PRN Tamsen Jeromy, APRN - CNP        nicotine (NICODERM CQ) 21 MG/24HR 1 patch  1 patch Transdermal Daily Philippe Menendez DO   1 patch at 10/01/18 0845         Physical Exam:  /63   Pulse 70   Temp 98 °F (36.7 °C)   Resp 16   Ht 6' (1.829 m)   Wt 180 lb (81.6 kg)   SpO2 98%   BMI 24.41 kg/m²    Wt Readings from Last 3 Encounters:   09/30/18 180 lb (81.6 kg)   09/14/18 180 lb (81.6 kg)   08/22/18 180 lb (81.6 kg)     Appearance: Awake, alert, no acute respiratory distress  Skin: Intact, no rash  Head: Normocephalic, atraumatic  Eyes: EOMI, no conjunctival erythema  ENMT: No pharyngeal erythema, MMM, no rhinorrhea  Neck: Supple, no elevated JVP, no carotid bruits  Lungs: Clear to auscultation bilaterally. No wheezes, rales, or rhonchi. Cardiac: Regular rate and rhythm, +S1S2, no murmurs apparent  Abdomen: Soft, nontender, +bowel sounds  Extremities: Moves all extremities x 4, no lower extremity edema  Neurologic: No focal motor deficits apparent, normal mood and affect  Peripheral Pulses: Intact posterior tibial pulses bilaterally    Intake/Output:    Intake/Output Summary (Last 24 hours) at 10/02/18 0910  Last data filed at 10/01/18 0944   Gross per 24 hour   Intake                0 ml   Output                0 ml   Net                0 ml     No intake/output data recorded.     Laboratory Tests:  Recent Labs      09/30/18   0015  10/01/18   0329   NA  139  141   K  4.1  4.0 perfusion was normal. CAD- RADS 2  2. Left-sided noncardiac pain  3. Tobacco use disorder  4. Remote history of  cocaine use  5. Family history of premature coronary artery disease  6. ASVD score of 5.6%  7. Mild pulmonary hypertension     Plan:  1. Continue Lipitor 40 mg by mouth daily and aspirin 81 mg by mouth daily in the presence of a coronary calcium and strong family history of coronary artery disease. He does have a mild coronary artery disease which is less than 50% involving the LAD and RCA. Since his coronary disease is less than 50% and had a normal mitral perfusion and CT scan. He does not need any further functional testing at this time. He needs risk factor modification which was already addressed. (SCOT-HEART trial)  2. He was advised to quit smoking. 3. Stable from cardiology point of view for discharge and he can follow-up with me in 1 month. Zahira Joya MD., [facility] [place].   [facility]) Cardiology

## 2018-10-04 LAB — COCAINE, CONFIRM, URINE: >1000 NG/ML

## 2018-10-19 NOTE — DISCHARGE SUMMARY
lesions. Neurologic:  Neurovascularly intact without any focal sensory/motor deficits. Cranial nerves: II-XII intact, grossly non-focal.  Psychiatric: Alert and oriented, thought content appropriate, normal insight      Consults:     IP CONSULT TO INTERNAL MEDICINE  IP CONSULT TO CARDIOLOGY    Significant Diagnostic Studies:   None     Disposition:  home     Discharge Instructions/Follow-up:  Keep scheduled follow up appointments. Take medications as prescribed     Code Status:  Prior     Activity: activity as tolerated    Diet: cardiac diet    Labs: For convenience and continuity at follow-up the following most recent labs are provided:      CBC:    Lab Results   Component Value Date    WBC 10.5 09/30/2018    HGB 13.1 09/30/2018    HCT 40.4 09/30/2018     09/30/2018       Renal:    Lab Results   Component Value Date     10/01/2018    K 4.0 10/01/2018     10/01/2018    CO2 26 10/01/2018    BUN 14 10/01/2018    CREATININE 1.1 10/01/2018    CALCIUM 8.4 10/01/2018       Discharge Medications:     Discharge Medication List as of 10/2/2018  6:00 PM           Details   aspirin 81 MG chewable tablet Take 1 tablet by mouth daily, Disp-30 tablet, R-3Normal      nitroGLYCERIN (NITROSTAT) 0.4 MG SL tablet up to max of 3 total doses. If no relief after 1 dose, call 911., Disp-25 tablet, R-3Normal      atorvastatin (LIPITOR) 40 MG tablet Take 1 tablet by mouth nightly, Disp-30 tablet, R-3Normal              Details   benzocaine (ORAJEL) 10 % mucosal gel Take by mouth as needed. , Disp-9 g, R-0, Print             Time Spent on discharge is more than 45 minutes in the examination, evaluation, counseling and review of medications and discharge plan.       Signed:    Clemencia Borden MD   10/19/2018

## 2018-10-21 NOTE — PROGRESS NOTES
Attempted to see pt on multiple occasions but he was away for testing. Pt was hypotensive while getting testing for CTA coronaries and it was associated with lightheadedness. Will keep pt in obs for the evening to monitor BP regardless of results of CTA coronaries.  BP has since improved, will continue to follow closely Awake/Alert

## 2019-01-04 ENCOUNTER — HOSPITAL ENCOUNTER (OUTPATIENT)
Age: 49
Setting detail: OBSERVATION
Discharge: HOME | End: 2019-01-05
Attending: EMERGENCY MEDICINE | Admitting: FAMILY MEDICINE
Payer: COMMERCIAL

## 2019-01-04 ENCOUNTER — APPOINTMENT (OUTPATIENT)
Dept: GENERAL RADIOLOGY | Age: 49
End: 2019-01-04
Payer: COMMERCIAL

## 2019-01-04 DIAGNOSIS — R07.9 CHEST PAIN, UNSPECIFIED TYPE: Primary | ICD-10-CM

## 2019-01-04 LAB
ACETAMINOPHEN LEVEL: <5 MCG/ML (ref 10–30)
ALBUMIN SERPL-MCNC: 4.5 G/DL (ref 3.5–5.2)
ALP BLD-CCNC: 71 U/L (ref 40–129)
ALT SERPL-CCNC: 20 U/L (ref 0–40)
ANION GAP SERPL CALCULATED.3IONS-SCNC: 11 MMOL/L (ref 7–16)
AST SERPL-CCNC: 22 U/L (ref 0–39)
BASOPHILS ABSOLUTE: 0.05 E9/L (ref 0–0.2)
BASOPHILS RELATIVE PERCENT: 0.3 % (ref 0–2)
BILIRUB SERPL-MCNC: 0.3 MG/DL (ref 0–1.2)
BUN BLDV-MCNC: 14 MG/DL (ref 6–20)
CALCIUM SERPL-MCNC: 9.4 MG/DL (ref 8.6–10.2)
CHLORIDE BLD-SCNC: 103 MMOL/L (ref 98–107)
CO2: 27 MMOL/L (ref 22–29)
CREAT SERPL-MCNC: 0.9 MG/DL (ref 0.7–1.2)
EOSINOPHILS ABSOLUTE: 0.26 E9/L (ref 0.05–0.5)
EOSINOPHILS RELATIVE PERCENT: 1.8 % (ref 0–6)
ETHANOL: <10 MG/DL (ref 0–0.08)
GFR AFRICAN AMERICAN: >60
GFR NON-AFRICAN AMERICAN: >60 ML/MIN/1.73
GLUCOSE BLD-MCNC: 97 MG/DL (ref 74–99)
HCT VFR BLD CALC: 48.1 % (ref 37–54)
HEMOGLOBIN: 15.9 G/DL (ref 12.5–16.5)
IMMATURE GRANULOCYTES #: 0.11 E9/L
IMMATURE GRANULOCYTES %: 0.8 % (ref 0–5)
LYMPHOCYTES ABSOLUTE: 2.23 E9/L (ref 1.5–4)
LYMPHOCYTES RELATIVE PERCENT: 15.6 % (ref 20–42)
MAGNESIUM: 2 MG/DL (ref 1.6–2.6)
MCH RBC QN AUTO: 28.7 PG (ref 26–35)
MCHC RBC AUTO-ENTMCNC: 33.1 % (ref 32–34.5)
MCV RBC AUTO: 86.8 FL (ref 80–99.9)
MONOCYTES ABSOLUTE: 0.78 E9/L (ref 0.1–0.95)
MONOCYTES RELATIVE PERCENT: 5.5 % (ref 2–12)
NEUTROPHILS ABSOLUTE: 10.88 E9/L (ref 1.8–7.3)
NEUTROPHILS RELATIVE PERCENT: 76 % (ref 43–80)
PDW BLD-RTO: 13.3 FL (ref 11.5–15)
PLATELET # BLD: 234 E9/L (ref 130–450)
PMV BLD AUTO: 10.5 FL (ref 7–12)
POTASSIUM SERPL-SCNC: 4 MMOL/L (ref 3.5–5)
RBC # BLD: 5.54 E12/L (ref 3.8–5.8)
SALICYLATE, SERUM: <0.3 MG/DL (ref 0–30)
SODIUM BLD-SCNC: 141 MMOL/L (ref 132–146)
TOTAL PROTEIN: 7.9 G/DL (ref 6.4–8.3)
TRICYCLIC ANTIDEPRESSANTS SCREEN SERUM: NEGATIVE NG/ML
TROPONIN: <0.01 NG/ML (ref 0–0.03)
WBC # BLD: 14.3 E9/L (ref 4.5–11.5)

## 2019-01-04 PROCEDURE — 85025 COMPLETE CBC W/AUTO DIFF WBC: CPT

## 2019-01-04 PROCEDURE — G0378 HOSPITAL OBSERVATION PER HR: HCPCS

## 2019-01-04 PROCEDURE — 6370000000 HC RX 637 (ALT 250 FOR IP): Performed by: STUDENT IN AN ORGANIZED HEALTH CARE EDUCATION/TRAINING PROGRAM

## 2019-01-04 PROCEDURE — 71046 X-RAY EXAM CHEST 2 VIEWS: CPT

## 2019-01-04 PROCEDURE — 83735 ASSAY OF MAGNESIUM: CPT

## 2019-01-04 PROCEDURE — G0480 DRUG TEST DEF 1-7 CLASSES: HCPCS

## 2019-01-04 PROCEDURE — 36415 COLL VENOUS BLD VENIPUNCTURE: CPT

## 2019-01-04 PROCEDURE — 84484 ASSAY OF TROPONIN QUANT: CPT

## 2019-01-04 PROCEDURE — 80053 COMPREHEN METABOLIC PANEL: CPT

## 2019-01-04 PROCEDURE — 99285 EMERGENCY DEPT VISIT HI MDM: CPT

## 2019-01-04 PROCEDURE — 80307 DRUG TEST PRSMV CHEM ANLYZR: CPT

## 2019-01-04 RX ORDER — ASPIRIN 81 MG/1
324 TABLET, CHEWABLE ORAL ONCE
Status: COMPLETED | OUTPATIENT
Start: 2019-01-04 | End: 2019-01-04

## 2019-01-04 RX ADMIN — ASPIRIN 81 MG 324 MG: 81 TABLET ORAL at 22:51

## 2019-01-04 ASSESSMENT — PAIN DESCRIPTION - ORIENTATION: ORIENTATION: MID

## 2019-01-04 ASSESSMENT — PAIN DESCRIPTION - PAIN TYPE: TYPE: ACUTE PAIN

## 2019-01-04 ASSESSMENT — PAIN DESCRIPTION - LOCATION: LOCATION: CHEST

## 2019-01-04 ASSESSMENT — PAIN SCALES - GENERAL: PAINLEVEL_OUTOF10: 0

## 2019-01-05 VITALS
WEIGHT: 178 LBS | BODY MASS INDEX: 24.11 KG/M2 | HEART RATE: 69 BPM | DIASTOLIC BLOOD PRESSURE: 58 MMHG | SYSTOLIC BLOOD PRESSURE: 98 MMHG | RESPIRATION RATE: 18 BRPM | TEMPERATURE: 98.1 F | OXYGEN SATURATION: 96 % | HEIGHT: 72 IN

## 2019-01-05 PROBLEM — B18.2 HEP C W/O COMA, CHRONIC (HCC): Status: ACTIVE | Noted: 2019-01-05

## 2019-01-05 PROBLEM — D72.829 LEUKOCYTOSIS: Status: ACTIVE | Noted: 2019-01-05

## 2019-01-05 PROBLEM — I20.0 UNSTABLE ANGINA (HCC): Status: ACTIVE | Noted: 2019-01-05

## 2019-01-05 LAB
AMPHETAMINE SCREEN, URINE: NOT DETECTED
BARBITURATE SCREEN URINE: NOT DETECTED
BASOPHILS ABSOLUTE: 0.07 E9/L (ref 0–0.2)
BASOPHILS RELATIVE PERCENT: 0.8 % (ref 0–2)
BENZODIAZEPINE SCREEN, URINE: NOT DETECTED
CANNABINOID SCREEN URINE: NOT DETECTED
CHOLESTEROL, TOTAL: 205 MG/DL (ref 0–199)
COCAINE METABOLITE SCREEN URINE: POSITIVE
D DIMER: <200 NG/ML DDU
EOSINOPHILS ABSOLUTE: 0.55 E9/L (ref 0.05–0.5)
EOSINOPHILS RELATIVE PERCENT: 6.1 % (ref 0–6)
HCT VFR BLD CALC: 46.8 % (ref 37–54)
HDLC SERPL-MCNC: 37 MG/DL
HEMOGLOBIN: 14.9 G/DL (ref 12.5–16.5)
IMMATURE GRANULOCYTES #: 0.03 E9/L
IMMATURE GRANULOCYTES %: 0.3 % (ref 0–5)
LDL CHOLESTEROL CALCULATED: 124 MG/DL (ref 0–99)
LYMPHOCYTES ABSOLUTE: 2.85 E9/L (ref 1.5–4)
LYMPHOCYTES RELATIVE PERCENT: 31.5 % (ref 20–42)
MCH RBC QN AUTO: 28 PG (ref 26–35)
MCHC RBC AUTO-ENTMCNC: 31.8 % (ref 32–34.5)
MCV RBC AUTO: 87.8 FL (ref 80–99.9)
METHADONE SCREEN, URINE: NOT DETECTED
MONOCYTES ABSOLUTE: 0.66 E9/L (ref 0.1–0.95)
MONOCYTES RELATIVE PERCENT: 7.3 % (ref 2–12)
NEUTROPHILS ABSOLUTE: 4.89 E9/L (ref 1.8–7.3)
NEUTROPHILS RELATIVE PERCENT: 54 % (ref 43–80)
OPIATE SCREEN URINE: NOT DETECTED
PDW BLD-RTO: 13.6 FL (ref 11.5–15)
PHENCYCLIDINE SCREEN URINE: NOT DETECTED
PLATELET # BLD: 214 E9/L (ref 130–450)
PMV BLD AUTO: 10.7 FL (ref 7–12)
PROPOXYPHENE SCREEN: NOT DETECTED
RBC # BLD: 5.33 E12/L (ref 3.8–5.8)
TRIGL SERPL-MCNC: 219 MG/DL (ref 0–149)
TROPONIN: <0.01 NG/ML (ref 0–0.03)
TROPONIN: <0.01 NG/ML (ref 0–0.03)
VLDLC SERPL CALC-MCNC: 44 MG/DL
WBC # BLD: 9.1 E9/L (ref 4.5–11.5)

## 2019-01-05 PROCEDURE — 2580000003 HC RX 258: Performed by: FAMILY MEDICINE

## 2019-01-05 PROCEDURE — 99245 OFF/OP CONSLTJ NEW/EST HI 55: CPT | Performed by: INTERNAL MEDICINE

## 2019-01-05 PROCEDURE — G0378 HOSPITAL OBSERVATION PER HR: HCPCS

## 2019-01-05 PROCEDURE — 36415 COLL VENOUS BLD VENIPUNCTURE: CPT

## 2019-01-05 PROCEDURE — 85378 FIBRIN DEGRADE SEMIQUANT: CPT

## 2019-01-05 PROCEDURE — 80061 LIPID PANEL: CPT

## 2019-01-05 PROCEDURE — APPSS60 APP SPLIT SHARED TIME 46-60 MINUTES: Performed by: NURSE PRACTITIONER

## 2019-01-05 PROCEDURE — 85025 COMPLETE CBC W/AUTO DIFF WBC: CPT

## 2019-01-05 PROCEDURE — 84484 ASSAY OF TROPONIN QUANT: CPT

## 2019-01-05 PROCEDURE — 6370000000 HC RX 637 (ALT 250 FOR IP): Performed by: FAMILY MEDICINE

## 2019-01-05 RX ORDER — ONDANSETRON 2 MG/ML
4 INJECTION INTRAMUSCULAR; INTRAVENOUS EVERY 6 HOURS PRN
Status: DISCONTINUED | OUTPATIENT
Start: 2019-01-05 | End: 2019-01-05 | Stop reason: HOSPADM

## 2019-01-05 RX ORDER — NITROGLYCERIN 0.4 MG/1
TABLET SUBLINGUAL
Qty: 25 TABLET | Refills: 0 | Status: SHIPPED | OUTPATIENT
Start: 2019-01-05 | End: 2019-06-18

## 2019-01-05 RX ORDER — SODIUM CHLORIDE 0.9 % (FLUSH) 0.9 %
10 SYRINGE (ML) INJECTION EVERY 12 HOURS SCHEDULED
Status: DISCONTINUED | OUTPATIENT
Start: 2019-01-05 | End: 2019-01-05 | Stop reason: HOSPADM

## 2019-01-05 RX ORDER — KETOROLAC TROMETHAMINE 30 MG/ML
15 INJECTION, SOLUTION INTRAMUSCULAR; INTRAVENOUS EVERY 6 HOURS PRN
Status: DISCONTINUED | OUTPATIENT
Start: 2019-01-05 | End: 2019-01-05 | Stop reason: HOSPADM

## 2019-01-05 RX ORDER — NITROGLYCERIN 0.4 MG/1
0.4 TABLET SUBLINGUAL EVERY 5 MIN PRN
Status: DISCONTINUED | OUTPATIENT
Start: 2019-01-05 | End: 2019-01-05 | Stop reason: HOSPADM

## 2019-01-05 RX ORDER — ATORVASTATIN CALCIUM 40 MG/1
40 TABLET, FILM COATED ORAL NIGHTLY
Qty: 30 TABLET | Refills: 0 | Status: SHIPPED | OUTPATIENT
Start: 2019-01-05 | End: 2019-06-18 | Stop reason: ALTCHOICE

## 2019-01-05 RX ORDER — ASPIRIN 81 MG/1
81 TABLET, CHEWABLE ORAL DAILY
Status: DISCONTINUED | OUTPATIENT
Start: 2019-01-05 | End: 2019-01-05 | Stop reason: HOSPADM

## 2019-01-05 RX ORDER — ACETAMINOPHEN 325 MG/1
650 TABLET ORAL EVERY 4 HOURS PRN
Status: DISCONTINUED | OUTPATIENT
Start: 2019-01-05 | End: 2019-01-05 | Stop reason: HOSPADM

## 2019-01-05 RX ORDER — ATORVASTATIN CALCIUM 40 MG/1
40 TABLET, FILM COATED ORAL NIGHTLY
Status: DISCONTINUED | OUTPATIENT
Start: 2019-01-05 | End: 2019-01-05 | Stop reason: HOSPADM

## 2019-01-05 RX ORDER — SODIUM CHLORIDE 0.9 % (FLUSH) 0.9 %
10 SYRINGE (ML) INJECTION PRN
Status: DISCONTINUED | OUTPATIENT
Start: 2019-01-05 | End: 2019-01-05 | Stop reason: HOSPADM

## 2019-01-05 RX ADMIN — ASPIRIN 81 MG 81 MG: 81 TABLET ORAL at 08:24

## 2019-01-05 RX ADMIN — Medication 10 ML: at 08:24

## 2019-01-05 ASSESSMENT — ENCOUNTER SYMPTOMS
NAUSEA: 0
DIARRHEA: 0
SHORTNESS OF BREATH: 1
WHEEZING: 0
EYE REDNESS: 0
EYE PAIN: 0
SINUS PRESSURE: 0
SORE THROAT: 0
ABDOMINAL PAIN: 0
EYE DISCHARGE: 0
VOMITING: 0
COUGH: 0
BACK PAIN: 0

## 2019-01-05 ASSESSMENT — PAIN SCALES - GENERAL
PAINLEVEL_OUTOF10: 0

## 2019-01-09 LAB — COCAINE, CONFIRM, URINE: >1000 NG/ML

## 2019-01-13 LAB
EKG ATRIAL RATE: 75 BPM
EKG P AXIS: 72 DEGREES
EKG P-R INTERVAL: 140 MS
EKG Q-T INTERVAL: 382 MS
EKG QRS DURATION: 88 MS
EKG QTC CALCULATION (BAZETT): 426 MS
EKG R AXIS: 94 DEGREES
EKG T AXIS: 53 DEGREES
EKG VENTRICULAR RATE: 75 BPM

## 2019-05-31 ENCOUNTER — HOSPITAL ENCOUNTER (INPATIENT)
Age: 49
LOS: 14 days | Discharge: HOME | DRG: 650 | End: 2019-06-14
Attending: EMERGENCY MEDICINE | Admitting: SURGERY
Payer: COMMERCIAL

## 2019-05-31 ENCOUNTER — APPOINTMENT (OUTPATIENT)
Dept: CT IMAGING | Age: 49
DRG: 650 | End: 2019-05-31
Payer: COMMERCIAL

## 2019-05-31 ENCOUNTER — APPOINTMENT (OUTPATIENT)
Dept: GENERAL RADIOLOGY | Age: 49
DRG: 650 | End: 2019-05-31
Payer: COMMERCIAL

## 2019-05-31 DIAGNOSIS — S36.039A LACERATION OF SPLEEN, INITIAL ENCOUNTER: Primary | ICD-10-CM
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
## 2019-05-31 DIAGNOSIS — S36.029A: ICD-10-CM

## 2019-05-31 DIAGNOSIS — S36.899A TRAUMATIC HEMOPERITONEUM, INITIAL ENCOUNTER: ICD-10-CM

## 2019-05-31 DIAGNOSIS — S22.32XA: ICD-10-CM

## 2019-05-31 PROBLEM — D62 ACUTE BLOOD LOSS ANEMIA: Status: ACTIVE | Noted: 2019-05-31

## 2019-05-31 LAB
ABO/RH: NORMAL
ALBUMIN SERPL-MCNC: 4.7 G/DL (ref 3.5–5.2)
ALP BLD-CCNC: 69 U/L (ref 40–129)
ALT SERPL-CCNC: 13 U/L (ref 0–40)
ANGLE (CLOT STRENGTH): 71 DEGREE (ref 59–74)
ANION GAP SERPL CALCULATED.3IONS-SCNC: 13 MMOL/L (ref 7–16)
ANTIBODY SCREEN: NORMAL
AST SERPL-CCNC: 29 U/L (ref 0–39)
BILIRUB SERPL-MCNC: 0.3 MG/DL (ref 0–1.2)
BUN BLDV-MCNC: 18 MG/DL (ref 6–20)
CALCIUM SERPL-MCNC: 9.2 MG/DL (ref 8.6–10.2)
CHLORIDE BLD-SCNC: 106 MMOL/L (ref 98–107)
CO2: 23 MMOL/L (ref 22–29)
CREAT SERPL-MCNC: 1.8 MG/DL (ref 0.7–1.2)
EPL-TEG: 0.2 % (ref 0–15)
G-TEG: 10.6 K D/SC (ref 4.5–11)
GFR AFRICAN AMERICAN: 49
GFR AFRICAN AMERICAN: 56
GFR NON-AFRICAN AMERICAN: 40 ML/MIN/1.73
GFR NON-AFRICAN AMERICAN: 46 ML/MIN/1.73
GLUCOSE BLD-MCNC: 135 MG/DL (ref 74–99)
GLUCOSE BLD-MCNC: 143 MG/DL (ref 74–99)
HCT VFR BLD CALC: 36 % (ref 37–54)
HCT VFR BLD CALC: 42.3 % (ref 37–54)
HEMOGLOBIN: 12.1 G/DL (ref 12.5–16.5)
HEMOGLOBIN: 13.5 G/DL (ref 12.5–16.5)
K (CLOTTING TIME): 1.2 MIN (ref 1–3)
LY30 (FIBRINOLYSIS): 0.2 % (ref 0–8)
MA (MAX AMPLITUDE): 68 MM (ref 50–70)
MCH RBC QN AUTO: 28.8 PG (ref 26–35)
MCH RBC QN AUTO: 29.8 PG (ref 26–35)
MCHC RBC AUTO-ENTMCNC: 31.9 % (ref 32–34.5)
MCHC RBC AUTO-ENTMCNC: 33.6 % (ref 32–34.5)
MCV RBC AUTO: 88.7 FL (ref 80–99.9)
MCV RBC AUTO: 90.4 FL (ref 80–99.9)
PDW BLD-RTO: 13.3 FL (ref 11.5–15)
PDW BLD-RTO: 13.4 FL (ref 11.5–15)
PERFORMED ON: ABNORMAL
PLATELET # BLD: 227 E9/L (ref 130–450)
PLATELET # BLD: 260 E9/L (ref 130–450)
PMV BLD AUTO: 11.2 FL (ref 7–12)
PMV BLD AUTO: 11.2 FL (ref 7–12)
POC CHLORIDE: 112 MMOL/L (ref 100–108)
POC CREATININE: 1.6 MG/DL (ref 0.7–1.2)
POC POTASSIUM: 5.2 MMOL/L (ref 3.5–5)
POC SODIUM: 140 MMOL/L (ref 132–146)
POTASSIUM SERPL-SCNC: 5.4 MMOL/L (ref 3.5–5)
R (REACTION TIME): 6.6 MIN (ref 5–10)
RBC # BLD: 4.06 E12/L (ref 3.8–5.8)
RBC # BLD: 4.68 E12/L (ref 3.8–5.8)
SODIUM BLD-SCNC: 142 MMOL/L (ref 132–146)
TOTAL PROTEIN: 8 G/DL (ref 6.4–8.3)
WBC # BLD: 13 E9/L (ref 4.5–11.5)
WBC # BLD: 18.7 E9/L (ref 4.5–11.5)

## 2019-05-31 PROCEDURE — 85347 COAGULATION TIME ACTIVATED: CPT

## 2019-05-31 PROCEDURE — 2000000000 HC ICU R&B

## 2019-05-31 PROCEDURE — 2580000003 HC RX 258: Performed by: STUDENT IN AN ORGANIZED HEALTH CARE EDUCATION/TRAINING PROGRAM

## 2019-05-31 PROCEDURE — 6360000002 HC RX W HCPCS: Performed by: STUDENT IN AN ORGANIZED HEALTH CARE EDUCATION/TRAINING PROGRAM

## 2019-05-31 PROCEDURE — 96374 THER/PROPH/DIAG INJ IV PUSH: CPT

## 2019-05-31 PROCEDURE — 2580000003 HC RX 258: Performed by: RADIOLOGY

## 2019-05-31 PROCEDURE — 82435 ASSAY OF BLOOD CHLORIDE: CPT

## 2019-05-31 PROCEDURE — 71260 CT THORAX DX C+: CPT

## 2019-05-31 PROCEDURE — 87081 CULTURE SCREEN ONLY: CPT

## 2019-05-31 PROCEDURE — 86901 BLOOD TYPING SEROLOGIC RH(D): CPT

## 2019-05-31 PROCEDURE — 6360000004 HC RX CONTRAST MEDICATION: Performed by: RADIOLOGY

## 2019-05-31 PROCEDURE — 85027 COMPLETE CBC AUTOMATED: CPT

## 2019-05-31 PROCEDURE — 85384 FIBRINOGEN ACTIVITY: CPT

## 2019-05-31 PROCEDURE — 86900 BLOOD TYPING SEROLOGIC ABO: CPT

## 2019-05-31 PROCEDURE — 82947 ASSAY GLUCOSE BLOOD QUANT: CPT

## 2019-05-31 PROCEDURE — 84132 ASSAY OF SERUM POTASSIUM: CPT

## 2019-05-31 PROCEDURE — 74177 CT ABD & PELVIS W/CONTRAST: CPT

## 2019-05-31 PROCEDURE — 84295 ASSAY OF SERUM SODIUM: CPT

## 2019-05-31 PROCEDURE — 99285 EMERGENCY DEPT VISIT HI MDM: CPT

## 2019-05-31 PROCEDURE — 6360000002 HC RX W HCPCS

## 2019-05-31 PROCEDURE — 6360000002 HC RX W HCPCS: Performed by: NURSE PRACTITIONER

## 2019-05-31 PROCEDURE — 72125 CT NECK SPINE W/O DYE: CPT

## 2019-05-31 PROCEDURE — 70450 CT HEAD/BRAIN W/O DYE: CPT

## 2019-05-31 PROCEDURE — 36415 COLL VENOUS BLD VENIPUNCTURE: CPT

## 2019-05-31 PROCEDURE — 96376 TX/PRO/DX INJ SAME DRUG ADON: CPT

## 2019-05-31 PROCEDURE — 80053 COMPREHEN METABOLIC PANEL: CPT

## 2019-05-31 PROCEDURE — 86850 RBC ANTIBODY SCREEN: CPT

## 2019-05-31 PROCEDURE — 82565 ASSAY OF CREATININE: CPT

## 2019-05-31 PROCEDURE — 2580000003 HC RX 258: Performed by: NURSE PRACTITIONER

## 2019-05-31 PROCEDURE — 71046 X-RAY EXAM CHEST 2 VIEWS: CPT

## 2019-05-31 PROCEDURE — 85576 BLOOD PLATELET AGGREGATION: CPT

## 2019-05-31 PROCEDURE — 6370000000 HC RX 637 (ALT 250 FOR IP): Performed by: STUDENT IN AN ORGANIZED HEALTH CARE EDUCATION/TRAINING PROGRAM

## 2019-05-31 RX ORDER — SODIUM CHLORIDE 0.9 % (FLUSH) 0.9 %
10 SYRINGE (ML) INJECTION EVERY 12 HOURS SCHEDULED
Status: DISCONTINUED | OUTPATIENT
Start: 2019-05-31 | End: 2019-06-14 | Stop reason: HOSPADM

## 2019-05-31 RX ORDER — FENTANYL CITRATE 50 UG/ML
50 INJECTION, SOLUTION INTRAMUSCULAR; INTRAVENOUS ONCE
Status: COMPLETED | OUTPATIENT
Start: 2019-05-31 | End: 2019-05-31

## 2019-05-31 RX ORDER — ATORVASTATIN CALCIUM 40 MG/1
40 TABLET, FILM COATED ORAL NIGHTLY
Status: DISCONTINUED | OUTPATIENT
Start: 2019-05-31 | End: 2019-06-14 | Stop reason: HOSPADM

## 2019-05-31 RX ORDER — NICOTINE 21 MG/24HR
1 PATCH, TRANSDERMAL 24 HOURS TRANSDERMAL DAILY
Status: DISCONTINUED | OUTPATIENT
Start: 2019-05-31 | End: 2019-06-10

## 2019-05-31 RX ORDER — SODIUM CHLORIDE 9 MG/ML
INJECTION, SOLUTION INTRAVENOUS CONTINUOUS
Status: DISCONTINUED | OUTPATIENT
Start: 2019-05-31 | End: 2019-06-02

## 2019-05-31 RX ORDER — FENTANYL CITRATE 50 UG/ML
100 INJECTION, SOLUTION INTRAMUSCULAR; INTRAVENOUS ONCE
Status: COMPLETED | OUTPATIENT
Start: 2019-05-31 | End: 2019-05-31

## 2019-05-31 RX ORDER — OXYCODONE HYDROCHLORIDE 5 MG/1
5 TABLET ORAL EVERY 4 HOURS PRN
Status: DISCONTINUED | OUTPATIENT
Start: 2019-05-31 | End: 2019-06-14 | Stop reason: HOSPADM

## 2019-05-31 RX ORDER — ACETAMINOPHEN 325 MG/1
650 TABLET ORAL EVERY 4 HOURS
Status: DISCONTINUED | OUTPATIENT
Start: 2019-05-31 | End: 2019-06-14 | Stop reason: HOSPADM

## 2019-05-31 RX ORDER — ONDANSETRON 2 MG/ML
4 INJECTION INTRAMUSCULAR; INTRAVENOUS EVERY 6 HOURS PRN
Status: DISCONTINUED | OUTPATIENT
Start: 2019-05-31 | End: 2019-06-14 | Stop reason: HOSPADM

## 2019-05-31 RX ORDER — DIAPER,BRIEF,INFANT-TODD,DISP
EACH MISCELLANEOUS 3 TIMES DAILY
Status: DISCONTINUED | OUTPATIENT
Start: 2019-05-31 | End: 2019-06-14 | Stop reason: HOSPADM

## 2019-05-31 RX ORDER — FENTANYL CITRATE 50 UG/ML
75 INJECTION, SOLUTION INTRAMUSCULAR; INTRAVENOUS ONCE
Status: COMPLETED | OUTPATIENT
Start: 2019-05-31 | End: 2019-05-31

## 2019-05-31 RX ORDER — FENTANYL CITRATE 50 UG/ML
INJECTION, SOLUTION INTRAMUSCULAR; INTRAVENOUS
Status: COMPLETED
Start: 2019-05-31 | End: 2019-05-31

## 2019-05-31 RX ORDER — SODIUM CHLORIDE 0.9 % (FLUSH) 0.9 %
10 SYRINGE (ML) INJECTION PRN
Status: DISCONTINUED | OUTPATIENT
Start: 2019-05-31 | End: 2019-06-12

## 2019-05-31 RX ORDER — OXYCODONE HYDROCHLORIDE 10 MG/1
10 TABLET ORAL EVERY 4 HOURS PRN
Status: DISCONTINUED | OUTPATIENT
Start: 2019-05-31 | End: 2019-06-14 | Stop reason: HOSPADM

## 2019-05-31 RX ORDER — 0.9 % SODIUM CHLORIDE 0.9 %
1000 INTRAVENOUS SOLUTION INTRAVENOUS ONCE
Status: COMPLETED | OUTPATIENT
Start: 2019-05-31 | End: 2019-05-31

## 2019-05-31 RX ORDER — SODIUM CHLORIDE 0.9 % (FLUSH) 0.9 %
10 SYRINGE (ML) INJECTION
Status: COMPLETED | OUTPATIENT
Start: 2019-05-31 | End: 2019-05-31

## 2019-05-31 RX ORDER — FENTANYL CITRATE 50 UG/ML
INJECTION, SOLUTION INTRAMUSCULAR; INTRAVENOUS
Status: DISPENSED
Start: 2019-05-31 | End: 2019-06-01

## 2019-05-31 RX ADMIN — OXYCODONE HYDROCHLORIDE 10 MG: 10 TABLET ORAL at 19:36

## 2019-05-31 RX ADMIN — FENTANYL CITRATE 100 MCG: 50 INJECTION, SOLUTION INTRAMUSCULAR; INTRAVENOUS at 14:37

## 2019-05-31 RX ADMIN — Medication: at 21:43

## 2019-05-31 RX ADMIN — ACETAMINOPHEN 650 MG: 325 TABLET, FILM COATED ORAL at 21:42

## 2019-05-31 RX ADMIN — Medication 10 ML: at 13:45

## 2019-05-31 RX ADMIN — HYDROMORPHONE HYDROCHLORIDE 1 MG: 1 INJECTION, SOLUTION INTRAMUSCULAR; INTRAVENOUS; SUBCUTANEOUS at 17:30

## 2019-05-31 RX ADMIN — ATORVASTATIN CALCIUM 40 MG: 40 TABLET, FILM COATED ORAL at 21:42

## 2019-05-31 RX ADMIN — FENTANYL CITRATE 50 MCG: 50 INJECTION, SOLUTION INTRAMUSCULAR; INTRAVENOUS at 13:50

## 2019-05-31 RX ADMIN — Medication 10 ML: at 21:43

## 2019-05-31 RX ADMIN — SODIUM CHLORIDE 1000 ML: 9 INJECTION, SOLUTION INTRAVENOUS at 14:15

## 2019-05-31 RX ADMIN — ACETAMINOPHEN 650 MG: 325 TABLET, FILM COATED ORAL at 18:28

## 2019-05-31 RX ADMIN — FENTANYL CITRATE 75 MCG: 50 INJECTION, SOLUTION INTRAMUSCULAR; INTRAVENOUS at 14:18

## 2019-05-31 RX ADMIN — SODIUM CHLORIDE: 9 INJECTION, SOLUTION INTRAVENOUS at 18:28

## 2019-05-31 RX ADMIN — IOPAMIDOL 110 ML: 755 INJECTION, SOLUTION INTRAVENOUS at 13:45

## 2019-05-31 RX ADMIN — HYDROMORPHONE HYDROCHLORIDE 1 MG: 1 INJECTION, SOLUTION INTRAMUSCULAR; INTRAVENOUS; SUBCUTANEOUS at 22:58

## 2019-05-31 ASSESSMENT — PAIN DESCRIPTION - LOCATION
LOCATION: ABDOMEN

## 2019-05-31 ASSESSMENT — PAIN DESCRIPTION - PAIN TYPE
TYPE: ACUTE PAIN

## 2019-05-31 ASSESSMENT — PAIN SCALES - GENERAL
PAINLEVEL_OUTOF10: 5
PAINLEVEL_OUTOF10: 4
PAINLEVEL_OUTOF10: 5
PAINLEVEL_OUTOF10: 7
PAINLEVEL_OUTOF10: 10
PAINLEVEL_OUTOF10: 5
PAINLEVEL_OUTOF10: 2
PAINLEVEL_OUTOF10: 3
PAINLEVEL_OUTOF10: 10
PAINLEVEL_OUTOF10: 5
PAINLEVEL_OUTOF10: 9
PAINLEVEL_OUTOF10: 10

## 2019-05-31 ASSESSMENT — PAIN DESCRIPTION - DESCRIPTORS
DESCRIPTORS: SPASM

## 2019-05-31 ASSESSMENT — PAIN DESCRIPTION - FREQUENCY
FREQUENCY: INTERMITTENT

## 2019-05-31 ASSESSMENT — PAIN DESCRIPTION - ORIENTATION
ORIENTATION: LEFT

## 2019-05-31 NOTE — PLAN OF CARE
Problem: Falls - Risk of:  Goal: Will remain free from falls  Description  Will remain free from falls  Outcome: Met This Shift     Problem: Pain:  Goal: Pain level will decrease  Description  Pain level will decrease  Outcome: Met This Shift     Problem: Anxiety/Stress:  Goal: Level of anxiety will decrease  Description  Level of anxiety will decrease  Outcome: Met This Shift     Problem: Fluid Volume - Imbalance:  Goal: Absence of imbalanced fluid volume signs and symptoms  Description  Absence of imbalanced fluid volume signs and symptoms  Outcome: Met This Shift

## 2019-05-31 NOTE — H&P
TRAUMA HISTORY & PHYSICAL  Resident   5/31/2019  3:29 PM    Chief Complaint   Patient presents with    Fall     fall / roll down the ladder. about 2 hours ago. abd pains. left arm pains. neck pains. collar placed onto pt. reports back pains. repports pains worse with deep inspiration          HPI: Yanni Layne is a 50 y.o. male who presents from home for evaluation of fall from 10 feet. Patient fell on his left side, hit his head and denies LOC. He is not on [facility] [address]. He is having sharp pain in his left flank, shoulder and midline in his neck.        PRIMARY SURVEY    AIRWAY:   Airway normal  EMS ETT Absent   Noisy respirations Absent   Retractions: Absent   Vomiting/bleeding: Absent     BREATHING:    Midaxillary breath sound left:  Present  Midaxillary breath sound right: Present  Cough sound intensity:  Fair  Respiratory rate: 16  FiO2: RA  SpO2: 98%  SMI: 1000    CIRCULATION:   Femerol pulse rate: within normal limits  Femerol pulse intensity: present  Palpebral conjunctiva: Pink     BP      P     SpO2       T      F    Patient Vitals for the past 8 hrs:   BP Temp Pulse Resp SpO2 Weight   05/31/19 1415 129/75 -- 99 16 98 % --   05/31/19 1247 -- -- -- 18 -- --   05/31/19 1243 (!) 143/82 97.5 °F (36.4 °C) -- 20 98 % 190 lb (86.2 kg)   05/31/19 1240 -- -- 95 -- 95 % --       FAST EXAM: Not performed    Central Nervous System    GCS Initial 15 minutes   Eye  Motor  Verbal 4 - Opens eyes on own  6 - Follows simple motor commands  5 - Alert and oriented 4 - Opens eyes on own  6 - Follows simple motor commands  5 - Alert and oriented     Neuromuscular blockade: No  Pupil size:  Left 3 mm    Right 3 mm  Pupil reaction: Yes  Wiggles fingers: Left Yes Right Yes  Wiggles toes: Left Yes    Right Yes    Hand grasp:   Left normal       Right normal  Plantar flexion: Left normal     Right normal  Loss of consciousness: No    History Obtained From:  patient  Private Medical Doctor: Christy Duckworth DO    Pre-exisiting Medical normal  Motor normal    Distal Pulses  Left arm Normal  Right arm Normal  Left leg Normal  Right leg Normal    Capillary refill   Left arm normal  Right arm normal  Left leg normal   Right leg normal      Procedures in ED:  None    Radiology:   Xr Chest Standard (2 Vw)    Result Date: 2019  Patient MRN:  [id number] : 1970 Age: 50 years Gender: Male Order Date:  2019 12:45 PM EXAM: XR CHEST (2 VW) 3 images INDICATION:  dyspnea, fall dyspnea, fall COMPARISON: 2019 FINDINGS: The heart and the mediastinal structures are normal. There is mild COPD. There is minimal infiltrate/atelectasis in the right lung base. There is no pneumothorax. There is gastric distention. COPD with minimal atelectasis/infiltrates in the right lung base. Ct Head Wo Contrast    Result Date: 2019  Patient MRN:  [id number] : 1970 Age: 50 years Gender: Male Order Date:  2019 12:45 PM EXAM: CT HEAD WO CONTRAST NUMBER OF IMAGES:  161 INDICATION:  HEAD TRAUMA, CLOSED, MILD, GCS >= 13, NO RISK FACTORS, NEURO EXAM NORMAL COMPARISON: None Technique: Low-dose CT  acquisition technique included one of following options; 1 . Automated exposure control, 2. Adjustment of MA and or KV according to patient's size or 3. Use of iterative reconstruction. Multiple CT sections were obtained with sagittal and coronal MPR reconstructions. The ventricles are unremarkable. The gyri and sulci appear unremarkable. The white matter appears unremarkable. There is no evidence for hemorrhage. There is no infarct identified. There is no mass effect identified. There is no mass identified. Unremarkable scan of the head.      Ct Chest W Contrast    Result Date: 2019  Patient MRN:  [id number] : 1970 Age: 50 years Gender: Male Order Date:  2019 12:45 PM EXAM: CT CHEST W CONTRAST, CT ABDOMEN PELVIS W IV CONTRAST Dosage: 1707 mGY-cm Contrast: 110 mL Isovue-370 vertebral body height in the thoracic spine is normal. ill-defined subtle densities in the posterior aspect of the spleen. Findings are concerning for a subtle splenic laceration. No other visceral organ abnormality is identified. CRITICAL FINDING: Results were called and read back to the emergency room on 5/31/2019 at 1430 hours      Consultations:  None    Admission/Diagnosis:   50 y.o. male s/p fall 10 feet with splenic hematoma and hemoperitoneum     Code Status: Full    Plan of Treatment:  Admit to SICU   H/H q6  Pain control  Monitor hemodynamics  Monitor abdominal exam  Aggressive pulm hygiene     Plan discussed with Dr. Socrates Devine.     Quinten Son on 5/31/2019 at 3:29 PM

## 2019-05-31 NOTE — ED PROVIDER NOTES
ATTENDING PROVIDER ATTESTATION:     Simin Bills presented to the emergency department for evaluation of Fall (fall / roll down the ladder. about 2 hours ago. abd pains. left arm pains. neck pains. collar placed onto pt. reports back pains. repports pains worse with deep inspiration )    I have reviewed and discussed the case, including pertinent history (medical, surgical, family and social) and exam findings with the Midlevel and the Nurse assigned to Simin Bills. I have personally performed and/or participated in the history, exam, medical decision making, and procedures and agree with all pertinent clinical information. I have reviewed my findings and recommendations with Simin Bills and members of family present at the time of disposition. .          1. Laceration of spleen, initial encounter    2. Traumatic hemoperitoneum, initial encounter    3. Fall from ladder, initial encounter                 Department of Emergency Medicine   ED  Provider Note  Admit Date/RoomTime: 5/31/2019  1:07 PM  ED Room: [address] [place] [address]                HPI:  5/31/19, Time: 2:37 PM  .       Simin Bills is a 50 y.o. male presenting to the ED after a fall, beginning prior to arrival.  The complaint has been constant, moderate in severity, and worsened by movement. Reports he fell from a ladder, 2 hrs ago. Says he rolled down the ladder. Fall from 10-12 feet. C/o pain in left chest/flank/shoulder. Pain worse with breathing. Denies headache.  Also with neck pain       Glascow Coma Scale at time of initial examination  Best Eye Response 4 - Opens eyes on own   Best Verbal Response 5 - Alert and oriented   Best Motor Response 6 - Follows simple motor commands   Total 15       Review of Systems:   Pertinent positives and negatives are stated within HPI, all other systems reviewed and are negative.              --------------------------------------------- PAST HISTORY ---------------------------------------------  Past Medical History:  has a past medical history of GERD (gastroesophageal reflux disease). Past Surgical History:  has no past surgical history on file. Social History:  reports that he has been smoking. He has a 30.00 pack-year smoking history. He has never used smokeless tobacco. He reports that he drinks alcohol. He reports that he does not use drugs. Family History: family history includes Diabetes in his father and mother; Heart Disease in his father and mother; High Blood Pressure in his father and mother; High Cholesterol in his father and mother. The patients home medications have been reviewed. Allergies: Patient has no known allergies. ------------------------- NURSING NOTES AND VITALS REVIEWED ---------------------------   The nursing notes within the ED encounter and vital signs as below have been reviewed. BP (!) 124/55   Pulse 73   Temp 99.2 °F (37.3 °C) (Temporal)   Resp 16   Ht 6' (1.829 m)   Wt 186 lb 15.2 oz (84.8 kg)   SpO2 98%   BMI 25.35 kg/m²   Oxygen Saturation Interpretation: Normal    The patients available past medical records and past encounters were reviewed.           -------------------------------------------------- RESULTS -------------------------------------------------    LABS:  Results for orders placed or performed during the hospital encounter of 05/31/19   CBC   Result Value Ref Range    WBC 18.7 (H) 4.5 - 11.5 E9/L    RBC 4.68 3.80 - 5.80 E12/L    Hemoglobin 13.5 12.5 - 16.5 g/dL    Hematocrit 42.3 37.0 - 54.0 %    MCV 90.4 80.0 - 99.9 fL    MCH 28.8 26.0 - 35.0 pg    MCHC 31.9 (L) 32.0 - 34.5 %    RDW 13.3 11.5 - 15.0 fL    Platelets 792 967 - 125 E9/L    MPV 11.2 7.0 - 12.0 fL   Comprehensive Metabolic Panel   Result Value Ref Range    Sodium 142 132 - 146 mmol/L    Potassium 5.4 (H) 3.5 - 5.0 mmol/L    Chloride 106 98 - 107 mmol/L    CO2 23 22 - 29 mmol/L    Anion Gap 13 7 - 16 mmol/L    Glucose 143 (H) 74 - 99 mg/dL    BUN 18 6 - 20 mg/dL CREATININE 1.8 (H) 0.7 - 1.2 mg/dL    GFR Non-African American 40 >=60 mL/min/1.73    GFR African American 49     Calcium 9.2 8.6 - 10.2 mg/dL    Total Protein 8.0 6.4 - 8.3 g/dL    Alb 4.7 3.5 - 5.2 g/dL    Total Bilirubin 0.3 0.0 - 1.2 mg/dL    Alkaline Phosphatase 69 40 - 129 U/L    ALT 13 0 - 40 U/L    AST 29 0 - 39 U/L   TEG lab test   Result Value Ref Range    R (Reaction Time) 6.6 5.0 - 10.0 min    K (Clotting Time) 1.2 1.0 - 3.0 min    Angle (Clot Strength) 71.0 59.0 - 74.0 degree    MA (Max Amplitude) 68.0 50.0 - 70.0 mm    G-TEG 10.6 4.5 - 11.0 K d/sc    EPL-TEG 0.2 0.0 - 15.0 %    LY30 (Fibrinolysis) 0.2 0.0 - 8.0 %   CBC   Result Value Ref Range    WBC 13.0 (H) 4.5 - 11.5 E9/L    RBC 4.06 3.80 - 5.80 E12/L    Hemoglobin 12.1 (L) 12.5 - 16.5 g/dL    Hematocrit 36.0 (L) 37.0 - 54.0 %    MCV 88.7 80.0 - 99.9 fL    MCH 29.8 26.0 - 35.0 pg    MCHC 33.6 32.0 - 34.5 %    RDW 13.4 11.5 - 15.0 fL    Platelets 014 707 - 717 E9/L    MPV 11.2 7.0 - 12.0 fL   POCT Venous   Result Value Ref Range    POC Sodium 140 132 - 146 mmol/L    POC Potassium 5.2 (H) 3.5 - 5.0 mmol/L    POC Chloride 112 (H) 100 - 108 mmol/L    POC Glucose 135 (H) 74 - 99 mg/dl    POC Creatinine 1.6 (H) 0.7 - 1.2 mg/dL    GFR Non-African American 46 >=60 mL/min/1.73    GFR  56     Performed on SEE BELOW    TYPE AND SCREEN   Result Value Ref Range    ABO/Rh O POS     Antibody Screen NEG        RADIOLOGY:  Interpreted by Radiologist.  CT Head WO Contrast   Final Result   Unremarkable scan of the head. CT Cervical Spine WO Contrast   Final Result   Degenerative changes      CT CHEST W CONTRAST   Final Result   Small to moderate amount of upper abdominal ascites/fluid and   significant amount of pelvic fluid. The density of this has Hounsfield   units measuring 61, and is most compatible with hemorrhage. There are   some ill-defined subtle densities in the posterior aspect of the   spleen.  Findings are concerning for a subtle splenic laceration. No   other visceral organ abnormality is identified. CRITICAL FINDING: Results were called and read back to the emergency   room on 5/31/2019 at 1430 hours      CT ABDOMEN PELVIS W IV CONTRAST Additional Contrast? None   Final Result   Small to moderate amount of upper abdominal ascites/fluid and   significant amount of pelvic fluid. The density of this has Hounsfield   units measuring 61, and is most compatible with hemorrhage. There are   some ill-defined subtle densities in the posterior aspect of the   spleen. Findings are concerning for a subtle splenic laceration. No   other visceral organ abnormality is identified. CRITICAL FINDING: Results were called and read back to the emergency   room on 5/31/2019 at 1430 hours      XR CHEST STANDARD (2 VW)   Final Result   COPD with minimal atelectasis/infiltrates in the right lung base.                    ---------------------------------------------------PHYSICAL EXAM--------------------------------------      Constitutional/General: Alert and oriented x3, well appearing, non toxic in NAD  Head: Normocephalic and atraumatic  Eyes: PERRL, EOMI  Mouth: Oropharynx clear, handling secretions, no trismus. No dental trauma, no oral trauma  Neck: Immobilized in cervical collar. No crepitus, no lacerations, abrasions, deformities, or stepoffs. Back: No midline cervical, thoracic, lumbar spine tenderness. No Stepoffs, abrasions, lacerations, or deformities. Pulmonary: Lungs clear to auscultation bilaterally, no wheezes, rales, or rhonchi. Not in respiratory distress  Chest: no chest wall tenderness, no crepitus  Cardiovascular:  Regular rate and rhythm, no murmurs, gallops, or rubs. 2+ distal pulses  Abdomen: Soft, LUQ tenderness, non distended, +BS, + guarding . no rebound,  or rigidity. No pulsatile masses appreciated  Extremities: Moves all extremities x 4. Warm and well perfused, no clubbing, cyanosis, or edema.  Capillary refill <3 seconds  Skin: warm and dry without rash  Neurologic: GCS 15,, no focal deficits,           ------------------------------ ED COURSE/MEDICAL DECISION MAKING----------------------  Medications   sodium chloride flush 0.9 % injection 10 mL (has no administration in time range)   sodium chloride flush 0.9 % injection 10 mL (has no administration in time range)   bacitracin zinc ointment (has no administration in time range)   acetaminophen (TYLENOL) tablet 650 mg (650 mg Oral Given 5/31/19 1828)   magnesium hydroxide (MILK OF MAGNESIA) 400 MG/5ML suspension 30 mL (has no administration in time range)   ondansetron (ZOFRAN) injection 4 mg (has no administration in time range)   oxyCODONE (ROXICODONE) immediate release tablet 5 mg ( Oral See Alternative 5/31/19 1936)     Or   oxyCODONE HCl (OXY-IR) immediate release tablet 10 mg (10 mg Oral Given 5/31/19 1936)   HYDROmorphone (DILAUDID) injection 0.5 mg (has no administration in time range)     Or   HYDROmorphone (DILAUDID) injection 1 mg (has no administration in time range)   atorvastatin (LIPITOR) tablet 40 mg (has no administration in time range)   0.9 % sodium chloride infusion ( Intravenous New Bag 5/31/19 1828)   nicotine (NICODERM CQ) 21 MG/24HR 1 patch (1 patch Transdermal Patch Applied 5/31/19 1958)   0.9 % sodium chloride bolus (0 mLs Intravenous Stopped 5/31/19 1735)   iopamidol (ISOVUE-370) 76 % injection 110 mL (110 mLs Intravenous Given 5/31/19 1345)   sodium chloride flush 0.9 % injection 10 mL (10 mLs Intravenous Given 5/31/19 1345)   fentaNYL (SUBLIMAZE) injection 50 mcg ( Intravenous Not Given 5/31/19 1857)   fentaNYL (SUBLIMAZE) injection 75 mcg (75 mcg Intravenous Given 5/31/19 1418)   fentaNYL (SUBLIMAZE) injection 100 mcg (100 mcg Intravenous Given 5/31/19 1437)         Medical Decision Making:    Fall from ladder, CT with splenic laceration. Trauma consult. hemodynamically stable.   Trauma to admit       Consultations:         Trauma

## 2019-05-31 NOTE — ED NOTES
Radiology Procedure Waiver   Name: Lynda Chavez  : 1970  MRN: [id number]    Date:  19    Time: 12:51 PM    Benefits of immediately proceeding with Radiology exam(s) without pre-testing outweigh the risks or are not indicated as specified below and therefore the following is/are being waived:    [] Pregnancy test   [] Patients LMP on-time and regular.   [] Patient had Tubal Ligation or has other Contraception Device. [] Patient  is Menopausal or Premenarcheal.    [] Patient had Full or Partial Hysterectomy. [] Protocol for Iodine allergy    [] MRI Questionnaire     [x] BUN/Creatinine   [x] Patient age w/no hx of renal dysfunction. [] Patient on Dialysis. [] Recent Normal Labs.   Electronically signed by EARL Goins CNP on 19 at 12:51 PM             EARL Phelan CNP  [phone]

## 2019-06-01 LAB
ANION GAP SERPL CALCULATED.3IONS-SCNC: 8 MMOL/L (ref 7–16)
BUN BLDV-MCNC: 18 MG/DL (ref 6–20)
CALCIUM SERPL-MCNC: 8.2 MG/DL (ref 8.6–10.2)
CHLORIDE BLD-SCNC: 108 MMOL/L (ref 98–107)
CO2: 23 MMOL/L (ref 22–29)
CREAT SERPL-MCNC: 1 MG/DL (ref 0.7–1.2)
GFR AFRICAN AMERICAN: >60
GFR NON-AFRICAN AMERICAN: >60 ML/MIN/1.73
GLUCOSE BLD-MCNC: 101 MG/DL (ref 74–99)
HCT VFR BLD CALC: 29.9 % (ref 37–54)
HCT VFR BLD CALC: 31 % (ref 37–54)
HCT VFR BLD CALC: 31.6 % (ref 37–54)
HCT VFR BLD CALC: 31.8 % (ref 37–54)
HEMOGLOBIN: 10.1 G/DL (ref 12.5–16.5)
HEMOGLOBIN: 10.4 G/DL (ref 12.5–16.5)
HEMOGLOBIN: 10.4 G/DL (ref 12.5–16.5)
HEMOGLOBIN: 9.9 G/DL (ref 12.5–16.5)
MCH RBC QN AUTO: 29.4 PG (ref 26–35)
MCH RBC QN AUTO: 29.7 PG (ref 26–35)
MCHC RBC AUTO-ENTMCNC: 32.6 % (ref 32–34.5)
MCHC RBC AUTO-ENTMCNC: 32.7 % (ref 32–34.5)
MCHC RBC AUTO-ENTMCNC: 32.9 % (ref 32–34.5)
MCHC RBC AUTO-ENTMCNC: 33.1 % (ref 32–34.5)
MCV RBC AUTO: 89.3 FL (ref 80–99.9)
MCV RBC AUTO: 89.8 FL (ref 80–99.9)
MCV RBC AUTO: 89.8 FL (ref 80–99.9)
MCV RBC AUTO: 90.1 FL (ref 80–99.9)
PDW BLD-RTO: 13.2 FL (ref 11.5–15)
PDW BLD-RTO: 13.4 FL (ref 11.5–15)
PLATELET # BLD: 170 E9/L (ref 130–450)
PLATELET # BLD: 183 E9/L (ref 130–450)
PLATELET # BLD: 185 E9/L (ref 130–450)
PLATELET # BLD: 186 E9/L (ref 130–450)
PMV BLD AUTO: 10.6 FL (ref 7–12)
PMV BLD AUTO: 10.9 FL (ref 7–12)
PMV BLD AUTO: 11.2 FL (ref 7–12)
PMV BLD AUTO: 11.2 FL (ref 7–12)
POTASSIUM REFLEX MAGNESIUM: 3.9 MMOL/L (ref 3.5–5)
RBC # BLD: 3.33 E12/L (ref 3.8–5.8)
RBC # BLD: 3.44 E12/L (ref 3.8–5.8)
RBC # BLD: 3.54 E12/L (ref 3.8–5.8)
RBC # BLD: 3.54 E12/L (ref 3.8–5.8)
SODIUM BLD-SCNC: 139 MMOL/L (ref 132–146)
WBC # BLD: 11.2 E9/L (ref 4.5–11.5)
WBC # BLD: 8.4 E9/L (ref 4.5–11.5)
WBC # BLD: 9.4 E9/L (ref 4.5–11.5)
WBC # BLD: 9.6 E9/L (ref 4.5–11.5)

## 2019-06-01 PROCEDURE — 99291 CRITICAL CARE FIRST HOUR: CPT | Performed by: SURGERY

## 2019-06-01 PROCEDURE — 6360000002 HC RX W HCPCS: Performed by: STUDENT IN AN ORGANIZED HEALTH CARE EDUCATION/TRAINING PROGRAM

## 2019-06-01 PROCEDURE — 6370000000 HC RX 637 (ALT 250 FOR IP): Performed by: STUDENT IN AN ORGANIZED HEALTH CARE EDUCATION/TRAINING PROGRAM

## 2019-06-01 PROCEDURE — 80048 BASIC METABOLIC PNL TOTAL CA: CPT

## 2019-06-01 PROCEDURE — 85027 COMPLETE CBC AUTOMATED: CPT

## 2019-06-01 PROCEDURE — 36415 COLL VENOUS BLD VENIPUNCTURE: CPT

## 2019-06-01 PROCEDURE — 2580000003 HC RX 258: Performed by: STUDENT IN AN ORGANIZED HEALTH CARE EDUCATION/TRAINING PROGRAM

## 2019-06-01 PROCEDURE — 2000000000 HC ICU R&B

## 2019-06-01 RX ORDER — DOCUSATE SODIUM 100 MG/1
100 CAPSULE, LIQUID FILLED ORAL 2 TIMES DAILY
Status: DISCONTINUED | OUTPATIENT
Start: 2019-06-01 | End: 2019-06-14 | Stop reason: HOSPADM

## 2019-06-01 RX ORDER — SENNA PLUS 8.6 MG/1
1 TABLET ORAL NIGHTLY
Status: DISCONTINUED | OUTPATIENT
Start: 2019-06-01 | End: 2019-06-14 | Stop reason: HOSPADM

## 2019-06-01 RX ADMIN — SODIUM CHLORIDE: 9 INJECTION, SOLUTION INTRAVENOUS at 14:54

## 2019-06-01 RX ADMIN — Medication 10 ML: at 09:38

## 2019-06-01 RX ADMIN — Medication: at 21:18

## 2019-06-01 RX ADMIN — HYDROMORPHONE HYDROCHLORIDE 1 MG: 1 INJECTION, SOLUTION INTRAMUSCULAR; INTRAVENOUS; SUBCUTANEOUS at 07:12

## 2019-06-01 RX ADMIN — DOCUSATE SODIUM 100 MG: 100 CAPSULE, LIQUID FILLED ORAL at 10:49

## 2019-06-01 RX ADMIN — HYDROMORPHONE HYDROCHLORIDE 1 MG: 1 INJECTION, SOLUTION INTRAMUSCULAR; INTRAVENOUS; SUBCUTANEOUS at 21:18

## 2019-06-01 RX ADMIN — HYDROMORPHONE HYDROCHLORIDE 1 MG: 1 INJECTION, SOLUTION INTRAMUSCULAR; INTRAVENOUS; SUBCUTANEOUS at 17:35

## 2019-06-01 RX ADMIN — ONDANSETRON HYDROCHLORIDE 4 MG: 2 SOLUTION INTRAMUSCULAR; INTRAVENOUS at 12:22

## 2019-06-01 RX ADMIN — OXYCODONE HYDROCHLORIDE 10 MG: 10 TABLET ORAL at 06:15

## 2019-06-01 RX ADMIN — DOCUSATE SODIUM 100 MG: 100 CAPSULE, LIQUID FILLED ORAL at 21:18

## 2019-06-01 RX ADMIN — Medication: at 09:38

## 2019-06-01 RX ADMIN — OXYCODONE HYDROCHLORIDE 10 MG: 10 TABLET ORAL at 10:49

## 2019-06-01 RX ADMIN — Medication 10 ML: at 17:35

## 2019-06-01 RX ADMIN — ACETAMINOPHEN 650 MG: 325 TABLET, FILM COATED ORAL at 06:03

## 2019-06-01 RX ADMIN — ACETAMINOPHEN 650 MG: 325 TABLET, FILM COATED ORAL at 09:37

## 2019-06-01 RX ADMIN — ONDANSETRON HYDROCHLORIDE 4 MG: 2 SOLUTION INTRAMUSCULAR; INTRAVENOUS at 23:33

## 2019-06-01 RX ADMIN — ACETAMINOPHEN 650 MG: 325 TABLET, FILM COATED ORAL at 14:52

## 2019-06-01 RX ADMIN — Medication: at 14:55

## 2019-06-01 RX ADMIN — SODIUM CHLORIDE: 9 INJECTION, SOLUTION INTRAVENOUS at 05:58

## 2019-06-01 RX ADMIN — ATORVASTATIN CALCIUM 40 MG: 40 TABLET, FILM COATED ORAL at 21:18

## 2019-06-01 RX ADMIN — Medication 10 ML: at 21:19

## 2019-06-01 ASSESSMENT — PAIN DESCRIPTION - PROGRESSION
CLINICAL_PROGRESSION: GRADUALLY IMPROVING
CLINICAL_PROGRESSION: GRADUALLY WORSENING
CLINICAL_PROGRESSION: GRADUALLY IMPROVING
CLINICAL_PROGRESSION: GRADUALLY IMPROVING

## 2019-06-01 ASSESSMENT — PAIN DESCRIPTION - ONSET
ONSET: PROGRESSIVE

## 2019-06-01 ASSESSMENT — PAIN DESCRIPTION - DESCRIPTORS
DESCRIPTORS: ACHING;DISCOMFORT;SHARP
DESCRIPTORS: CRAMPING;SORE
DESCRIPTORS: CRAMPING;SORE;SPASM

## 2019-06-01 ASSESSMENT — PAIN DESCRIPTION - FREQUENCY
FREQUENCY: INTERMITTENT

## 2019-06-01 ASSESSMENT — PAIN DESCRIPTION - ORIENTATION
ORIENTATION: LEFT

## 2019-06-01 ASSESSMENT — PAIN SCALES - GENERAL
PAINLEVEL_OUTOF10: 2
PAINLEVEL_OUTOF10: 6
PAINLEVEL_OUTOF10: 8
PAINLEVEL_OUTOF10: 5
PAINLEVEL_OUTOF10: 5
PAINLEVEL_OUTOF10: 10
PAINLEVEL_OUTOF10: 3
PAINLEVEL_OUTOF10: 9
PAINLEVEL_OUTOF10: 3
PAINLEVEL_OUTOF10: 5
PAINLEVEL_OUTOF10: 10
PAINLEVEL_OUTOF10: 3
PAINLEVEL_OUTOF10: 5

## 2019-06-01 ASSESSMENT — PAIN DESCRIPTION - LOCATION
LOCATION: ABDOMEN;BACK
LOCATION: ABDOMEN
LOCATION: ABDOMEN
LOCATION: RIB CAGE
LOCATION: ABDOMEN
LOCATION: ABDOMEN
LOCATION: ABDOMEN;FLANK

## 2019-06-01 ASSESSMENT — PAIN DESCRIPTION - PAIN TYPE
TYPE: ACUTE PAIN

## 2019-06-01 ASSESSMENT — PAIN - FUNCTIONAL ASSESSMENT: PAIN_FUNCTIONAL_ASSESSMENT: PREVENTS OR INTERFERES SOME ACTIVE ACTIVITIES AND ADLS

## 2019-06-01 NOTE — PROGRESS NOTES
[facility]  SURGICAL INTENSIVE CARE UNIT (SICU)  ATTENDING PHYSICIAN CRITICAL CARE PROGRESS NOTE     I have examined the patient, reviewed the record, and discussed the case with the APN/ resident. Please refer to the APN/ resident's note. I agree with the assessment and plan. I have reviewed all relevant labs and imaging data. The following summarizes my clinical findings and independent assessment. CC:  Critical care management after fall; spleen lac    Hospital Course/Overnight Events:  5/31--admitted after fall; grade III spleen lac  6/1--nothing new overnight    Pt reports some ongoing left sided pain.       Awake and alert  Follows commands  Hrt:  Regular; distal pulses intact  Lungs:  Fairly clear bilaterally; IS ~750  Abd:  Soft; BS active; minimally tender; no rebound; no guarding  Skin:  Warm/dry  Ext:  Moving all 4 ext; no edema  Neck:  c-collar in place    Patient Active Problem List    Diagnosis Date Noted    Hematoma of spleen, closed, initial encounter 05/31/2019    Traumatic hemoperitoneum 05/31/2019    Closed traumatic minimally displaced fracture of one rib of left side 05/31/2019    Acute blood loss anemia 05/31/2019    Unstable angina ([facility] [address] 01/05/2019    Leukocytosis 01/05/2019    Hep C w/o coma, chronic ([address] 01/05/2019    Atypical chest pain     Cocaine use     Nodule of left lung     Numbness and tingling of right lower extremity     Chest pain 03/26/2017    Tobacco use 03/26/2017       S/p fall  Grade III spleen lac with hemoperitoneum--monitor H/H and vitals  Left sided rib fx--pain control; IS/cough/deep breathing  Acute blood loss anemia--monitor H/H  Start diet  DVT risk--PCDs    Pt is at risk for hemodynamic instability and requires ongoing ICU care    Richard Hook MD, FACS  6/1/2019  7:20 AM      Critical care time exclusive of teaching and procedures = 39 minutes

## 2019-06-01 NOTE — PLAN OF CARE
Problem: Falls - Risk of:  Goal: Will remain free from falls  Description  Will remain free from falls  5/31/2019 2348 by Juliana Lomas RN  Outcome: Met This Shift  5/31/2019 1942 by Matheus Abreu RN  Outcome: Met This Shift  Goal: Absence of physical injury  Description  Absence of physical injury  Outcome: Met This Shift     Problem: Pain:  Goal: Pain level will decrease  Description  Pain level will decrease  5/31/2019 2348 by Juliana Lomas RN  Outcome: Met This Shift  5/31/2019 1942 by Matheus Abreu RN  Outcome: Met This Shift  Goal: Control of acute pain  Description  Control of acute pain  Outcome: Met This Shift     Problem: Anxiety/Stress:  Goal: Level of anxiety will decrease  Description  Level of anxiety will decrease  5/31/2019 1942 by Matheus Abreu RN  Outcome: Met This Shift     Problem: Fluid Volume - Imbalance:  Goal: Absence of imbalanced fluid volume signs and symptoms  Description  Absence of imbalanced fluid volume signs and symptoms  5/31/2019 2348 by Juliana Lomas RN  Outcome: Met This Shift  5/31/2019 1942 by Matheus Abreu RN  Outcome: Met This Shift

## 2019-06-01 NOTE — PROGRESS NOTES
Trauma Tertiary Survey    Admit Date: 5/31/2019  Hospital day 1    Other Fall 10 ft    CC:  Abdominal pain       Past Medical History:   Diagnosis Date    GERD (gastroesophageal reflux disease)        Alcohol pre-screening:  Men: How many times in the past year have you had 5 or more drinks in a day?  none    Scheduled Meds:   sodium chloride flush  10 mL Intravenous 2 times per day    bacitracin zinc   Topical TID    acetaminophen  650 mg Oral Q4H    atorvastatin  40 mg Oral Nightly    nicotine  1 patch Transdermal Daily     Continuous Infusions:   sodium chloride 100 mL/hr at 06/01/19 0558     PRN Meds:sodium chloride flush, magnesium hydroxide, ondansetron, oxyCODONE **OR** oxyCODONE, HYDROmorphone **OR** HYDROmorphone    Subjective:     No acute events  Pain controlled  NPO  Denied n/v    Objective:     Patient Vitals for the past 8 hrs:   BP Temp Temp src Pulse Resp SpO2   06/01/19 0800 113/61 97.8 °F (36.6 °C) Temporal 76 20 94 %   06/01/19 0700 107/61 -- -- 80 17 94 %   06/01/19 0600 (!) 103/55 98.4 °F (36.9 °C) Temporal 62 19 93 %   06/01/19 0500 102/64 -- -- 65 18 95 %   06/01/19 0400 (!) 91/54 99 °F (37.2 °C) Temporal 65 11 93 %   06/01/19 0300 (!) 100/56 -- -- 64 11 95 %       I/O last 3 completed shifts: In: 1162 [I.V.:1162]  Out: 775 [Urine:775]  No intake/output data recorded. Past Medical History:   Diagnosis Date    GERD (gastroesophageal reflux disease)        Radiology:  CT Head WO Contrast   Final Result   Unremarkable scan of the head. CT Cervical Spine WO Contrast   Final Result   Degenerative changes      CT CHEST W CONTRAST   Final Result   Small to moderate amount of upper abdominal ascites/fluid and   significant amount of pelvic fluid. The density of this has Hounsfield   units measuring 61, and is most compatible with hemorrhage. There are   some ill-defined subtle densities in the posterior aspect of the   spleen.  Findings are concerning for a subtle splenic laceration. No   other visceral organ abnormality is identified. CRITICAL FINDING: Results were called and read back to the emergency   room on 5/31/2019 at 1430 hours      CT ABDOMEN PELVIS W IV CONTRAST Additional Contrast? None   Final Result   Small to moderate amount of upper abdominal ascites/fluid and   significant amount of pelvic fluid. The density of this has Hounsfield   units measuring 61, and is most compatible with hemorrhage. There are   some ill-defined subtle densities in the posterior aspect of the   spleen. Findings are concerning for a subtle splenic laceration. No   other visceral organ abnormality is identified. CRITICAL FINDING: Results were called and read back to the emergency   room on 5/31/2019 at 1430 hours      XR CHEST STANDARD (2 VW)   Final Result   COPD with minimal atelectasis/infiltrates in the right lung base. PHYSICAL EXAM:   GCS:  4 - Opens eyes on own   6 - Follows simple motor commands  5 - Alert and oriented    Pupil size:  Left 4 mm Right 4 mm  Pupil reaction: Yes  Wiggles fingers: Left Yes Right Yes  Hand grasp:   Left normal   Right normal  Wiggles toes: Left Yes    Right Yes  Plantar flexion: Left normal  Right normal    General: NAD, awake and alert. A&Ox3  Head: Normocephalic, atraumatic  Eyes: PERRLA, EOMI. No sclera icterus. Lungs: No increased work of breathing. CTAB. No W/R/R. Cardiovascular: RRR. Abdomen: Soft, ND, TTP LUQ. No rebound, guarding or rigidity.   Extremities: Atraumatic, full range of motion  Skin: Warm, dry and intact    Spine:     Spine Tenderness ROM   Cervical 5 /10 Abnormal, aspen collar   Thoracic 0 /10 Normal   Lumbar 0 /10 Normal     Musculoskeletal    Joint Tenderness Swelling ROM   Right shoulder absent absent normal   Left shoulder absent absent normal   Right elbow absent absent normal   Left elbow absent absent normal   Right wrist absent absent normal   Left wrist absent absent normal   Right hand grasp absent absent normal   Left hand grasp absent absent normal   Right hip absent absent normal   Left hip absent absent normal   Right knee absent absent normal   Left knee absent absent normal   Right ankle absent absent normal   Left ankle absent absent normal   Right foot absent absent normal   Left foot absent absent normal       CONSULTS:     PROCEDURES: None    INJURIES:        Active Problems:    Hematoma of spleen, closed, initial encounter    Traumatic hemoperitoneum    Closed traumatic minimally displaced fracture of one rib of left side    Acute blood loss anemia  Resolved Problems:    * No resolved hospital problems. *        Assessment/Plan:       · Neuro:  Pain control w/ shaquille and Dilaudid IV PRN. Monitor neuro exam. Cspine pain on exam. Remain in aspen collar. Negative CT cervical spine. May need MRI. · CV: Monitor HD's in setting of solid organ injury. · Pulm: Monitor RR, SpO2. Incentive spirometry. · GI: Grade III splenic hematoma w/ hemoperitoneum. Monitor CBC q6. Start CLD. Bowel regimen. · Renal: Monitor UOP. · Endocrine: Monitor blood glucose. · MSK: Bedrest. PT/OT when activity advanced. · Heme: CBC q6hr for solid organ injury  · ID: No active issues. Bowel regimen: Colace, senna  Pain control: Shaquille, dilaudid  DVT prophylaxis: SCDs  GI: None  Glucose protocol: Monitor glucose  Mouth/Eye care: Per patient  Gardiner: None  CVC: PIV's  Family update: As available    Disposition:  Remain in ICU.        Electronically signed by Martín Alexis MD on 6/1/19 at 10:12 AM

## 2019-06-01 NOTE — DISCHARGE SUMMARY
Physician Discharge Summary     Patient ID:  Rebeca Herrera  [id number]  62 y.o.  1970    Admit date: 5/31/2019    Discharge date and time: 6/16/2019    Admitting Physician: Earnest Olmedo MD     Admission Diagnoses: Hematoma of spleen, closed, initial encounter [S36.029A]  Hematoma of spleen, closed, initial encounter [Q04.236T]    Discharge Diagnoses: Active Problems:    Hematoma of spleen, closed, initial encounter    Traumatic hemoperitoneum    Closed traumatic minimally displaced fracture of one rib of left side    Acute blood loss anemia    Laceration of spleen    Multiple closed fractures of ribs of left side    Severe protein-calorie malnutrition (HCC)    Surgical site infection    Pneumonia    Chronic obstructive pulmonary disease ([facility] [address]  Resolved Problems:    * No resolved hospital problems. *    Admission Condition: good    Discharged Condition: stable    Indication for Admission: Rib fracture, splenic laceration with intraperitoneal blood    Hospital Course/Procedures/Operation/treatments:   5/31-- Rebeca Herrera is a 50 y.o. male who presents from home for evaluation of fall from 10 feet. Patient fell on his left side, hit his head and denies LOC. He is not on [address] [place] [address] Road. He is having sharp pain in his left flank, shoulder and midline in his neck. CT scan demonstrates left rib fracture with splenic laceration and free intraabdominal blood without blush. Admitted to SICU for hemodynamic and H&H monitoring   6/1--nothing new overnight  6/2--no new issues  6/3-- no issues overnight  6/4 Transferred from SICU to general floor. Continues to complain of cervical spine pain. 6/5 MRI cervical spine showed ligamentous injury. Neurosurgery consulted. Custom cervical collar ordered  6/6: Hypotensive and pale, STAT CT abdomen/pelvis demonstrated a rebleeding of splenic laceration, s/p EX-lap with splenectomy and repair or colonic serosal tears. Pain control moderately controlled this AM. Awaiting bowel function. 6/7: Pain controlled. No return of bowel function. Voiding s/p Gardiner removal.  6/8: Continue no bowel function, NPO, +OOB. Received 1 U PRBC yesterday  6/9: +flatus, tachypneic though not SOB, stated on CLD, saline locked   6/10: worsening hypoxia, transferred to SICU. CTA showed pneumonia, no PE. Started on vanc, cefepime for HCAP  6/11: Afebrile overnight. Procalcitonin 10. No bowel movement yet and having intermittent drainage from midline, will plan for CT AP. 1 unit RBC transfused for Hb 6.8  6/12: O2 weaning  6/13: transferred from SICU last night. No overnight events  6/14: no new issues      Consults:   IP CONSULT TO CASE MANAGEMENT  IP CONSULT TO NEUROSURGERY  INPATIENT CONSULT TO ORTHOTIST/PROSTHETIST  INPATIENT CONSULT TO ORTHOTIST/PROSTHETIST  IP CONSULT TO INFECTIOUS DISEASES    Significant Diagnostic Studies:   Xr Chest Standard (2 Vw). Result Date: 5/31/2019. COPD with minimal atelectasis/infiltrates in the right lung base. Ct Head Wo Contrast.  Result Date: 5/31/2019. Unremarkable scan of the head. Ct Chest W Contrast.  Result Date: 5/31/2019. Small to moderate amount of upper abdominal ascites/fluid and significant amount of pelvic fluid. The density of this has Hounsfield units measuring 61, and is most compatible with hemorrhage. There are some ill-defined subtle densities in the posterior aspect of the spleen. Findings are concerning for a subtle splenic laceration. No other visceral organ abnormality is identified. Ct Cervical Spine Wo Contrast.  Result Date: 5/31/2019. Degenerative changes    Ct Abdomen Pelvis W Iv Contrast.  Result Date: 5/31/2019. Small to moderate amount of upper abdominal ascites/fluid and significant amount of pelvic fluid. The density of this has Hounsfield units measuring 61, and is most compatible with hemorrhage. There are some ill-defined subtle densities in the posterior aspect of the spleen.  Findings are concerning for a subtle splenic laceration. No other visceral organ abnormality is identified. MRI Cervical spine. 06/05/2019.  1. Study limited by suboptimal image quality. 2. Minimally displaced fracture of the C6 spinous process. 3. Diffuse edema in the posterior paraspinal soft tissues, most notably from C3 through C6. The appearance is concerning for disruption of the interspinous and/or supraspinous ligaments at these levels. The anterior longitudinal ligament, posterior longitudinal  ligament, and ligamentum flavum appear intact. 4. Edema in the posterior aspect of the opposing vertebral endplates at F3-D0, which may be due to recent trauma. 5. No definite spinal cord injury or epidural hematoma. 6. Multilevel degenerative disc disease with mild cervical canal  stenosis and mild to moderate neural foraminal narrowing from C3  through C7, as detailed in the body of the report. Discharge Exam:    Daily Trauma Progress Note 6/5/2019   Admit Date: 5/31/2019     Fall distance 10 feet   CC:  Follow up spleen injury     INJURIES:   Active Problems:    Hematoma of spleen, closed, initial encounter    Traumatic hemoperitoneum    Closed traumatic minimally displaced fracture of one rib of left side    Acute blood loss anemia    Laceration of spleen    Multiple closed fractures of ribs of left side  Resolved Problems:    * No resolved hospital problems. *     PREVIOUS 24 HOUR EVENTS: Afebrile  Vital signs stable. Cervical spine MRI showed ligament injury. Neurosurgery consulted.       SUBJECTIVE:  He continues with left flank pain. He denies any abdominal pain. Tolerates diet. Continues to have point tenderness along cervical spine.       OBJECTIVE:           Vitals:     06/04/19 0730 06/04/19 1500 06/05/19 0000 06/05/19 0845   BP: (!) 121/58 134/63 131/73 (!) 145/79   Pulse: 70 92 73 76   Resp: 16 16 16 16   Temp: 97.6 °F (36.4 °C) 97.1 °F (36.2 °C) 97.9 °F (36.6 °C) 97.8 °F (36.6 °C)   TempSrc: Temporal Temporal Temporal Temporal Services  Additional Discharge Instructions    Call [phone] Extension 2 for any questions/concerns and for follow up appointment in 2 weeks. Follow up with Neurosurgery. Follow up with Dr. Marcy Navarrete    Please follow the instructions checked below:    ACTIVITY INSTRUCTIONS:  [x]Increase activity as tolerated    [x]No heavy lifting or strenuous activity     [x]No driving until cleared by Neruosurgery. [x]No driving while taking pain medication  [x]Cough and deep breath 4 - 6 times a day   [x]Incentive Spirometer 4 - 6 times a day     WOUND/DRESSING INSTRUCTIONS:  [x]May shower      [x]No sitting in bath tub, hot tub or swimming. MEDICATION INSTRUCTIONS:  [x]Take medication as prescribed. [x]When taking pain medications, you may experience dizziness or drowsiness. Do not drink alcohol or drive when taking these medications. [x]You may take acetaminophen and/or Ibuprofen (over the counter) as per directions for mild pain. [x]You may experience constipation while taking pain medication - You may take over the counter stool softeners: docuscate (Colace) or sennosides S (Senokot - S). WORK:  [x]You may not return to work until cleared by [employer] and Neurosurgery.       Call physician for any of the following or for questions/concerns:   · Fever over 101° F    · Redness, swelling, hardness or warmth at the wound site (s)  · Unrelieved nausea/vomiting    · Foul smelling or cloudy drainage at the wound site (s)   · Unrelieved pain or increase in pain     · Increase in shortness of breath     Follow up:   Je Bryan DO  [address]  Mayra Morales [phone]    Schedule an appointment as soon as possible for a visit in 1 week  For follow up    [address]  [address] [phone]  Schedule an appointment as soon as possible for a visit in 2 weeks  For follow up    Maite Sheikh, [address] [place] [address]  [phone]    Schedule an appointment as soon as possible for a visit in 1 week  For follow up    Denice Zuleta MD  [address] [address]    Call      Leonel Cheng [facility] [phone]    Schedule an appointment as soon as possible for a visit in 1 week       Electronically signed by Kamini Cavanaugh RN MSN APRN-NP [facility] NP  [facility]NS CCRN 6/16/2019 6:35 AM

## 2019-06-02 LAB
ANION GAP SERPL CALCULATED.3IONS-SCNC: 12 MMOL/L (ref 7–16)
BUN BLDV-MCNC: 10 MG/DL (ref 6–20)
CALCIUM SERPL-MCNC: 8.3 MG/DL (ref 8.6–10.2)
CHLORIDE BLD-SCNC: 107 MMOL/L (ref 98–107)
CO2: 22 MMOL/L (ref 22–29)
CREAT SERPL-MCNC: 0.9 MG/DL (ref 0.7–1.2)
GFR AFRICAN AMERICAN: >60
GFR NON-AFRICAN AMERICAN: >60 ML/MIN/1.73
GLUCOSE BLD-MCNC: 99 MG/DL (ref 74–99)
HCT VFR BLD CALC: 31.2 % (ref 37–54)
HCT VFR BLD CALC: 32.3 % (ref 37–54)
HEMOGLOBIN: 10.2 G/DL (ref 12.5–16.5)
HEMOGLOBIN: 10.3 G/DL (ref 12.5–16.5)
MCH RBC QN AUTO: 29.2 PG (ref 26–35)
MCH RBC QN AUTO: 29.5 PG (ref 26–35)
MCHC RBC AUTO-ENTMCNC: 31.9 % (ref 32–34.5)
MCHC RBC AUTO-ENTMCNC: 32.7 % (ref 32–34.5)
MCV RBC AUTO: 90.2 FL (ref 80–99.9)
MCV RBC AUTO: 91.5 FL (ref 80–99.9)
MRSA CULTURE ONLY: NORMAL
PDW BLD-RTO: 13.2 FL (ref 11.5–15)
PDW BLD-RTO: 13.4 FL (ref 11.5–15)
PLATELET # BLD: 161 E9/L (ref 130–450)
PLATELET # BLD: 170 E9/L (ref 130–450)
PMV BLD AUTO: 10.6 FL (ref 7–12)
PMV BLD AUTO: 11.1 FL (ref 7–12)
POTASSIUM REFLEX MAGNESIUM: 4.1 MMOL/L (ref 3.5–5)
RBC # BLD: 3.46 E12/L (ref 3.8–5.8)
RBC # BLD: 3.53 E12/L (ref 3.8–5.8)
SODIUM BLD-SCNC: 141 MMOL/L (ref 132–146)
WBC # BLD: 7.8 E9/L (ref 4.5–11.5)
WBC # BLD: 8 E9/L (ref 4.5–11.5)

## 2019-06-02 PROCEDURE — 6360000002 HC RX W HCPCS: Performed by: STUDENT IN AN ORGANIZED HEALTH CARE EDUCATION/TRAINING PROGRAM

## 2019-06-02 PROCEDURE — 85027 COMPLETE CBC AUTOMATED: CPT

## 2019-06-02 PROCEDURE — 99291 CRITICAL CARE FIRST HOUR: CPT | Performed by: SURGERY

## 2019-06-02 PROCEDURE — 6370000000 HC RX 637 (ALT 250 FOR IP): Performed by: STUDENT IN AN ORGANIZED HEALTH CARE EDUCATION/TRAINING PROGRAM

## 2019-06-02 PROCEDURE — 2000000000 HC ICU R&B

## 2019-06-02 PROCEDURE — 36415 COLL VENOUS BLD VENIPUNCTURE: CPT

## 2019-06-02 PROCEDURE — 80048 BASIC METABOLIC PNL TOTAL CA: CPT

## 2019-06-02 PROCEDURE — 2580000003 HC RX 258: Performed by: STUDENT IN AN ORGANIZED HEALTH CARE EDUCATION/TRAINING PROGRAM

## 2019-06-02 RX ORDER — METHOCARBAMOL 750 MG/1
1500 TABLET, FILM COATED ORAL 4 TIMES DAILY
Status: DISCONTINUED | OUTPATIENT
Start: 2019-06-02 | End: 2019-06-14 | Stop reason: HOSPADM

## 2019-06-02 RX ADMIN — Medication: at 08:46

## 2019-06-02 RX ADMIN — ENOXAPARIN SODIUM 30 MG: 30 INJECTION SUBCUTANEOUS at 21:30

## 2019-06-02 RX ADMIN — METHOCARBAMOL TABLETS 1500 MG: 750 TABLET, COATED ORAL at 21:32

## 2019-06-02 RX ADMIN — OXYCODONE HYDROCHLORIDE 10 MG: 10 TABLET ORAL at 07:31

## 2019-06-02 RX ADMIN — METHOCARBAMOL TABLETS 1500 MG: 750 TABLET, COATED ORAL at 13:20

## 2019-06-02 RX ADMIN — HYDROMORPHONE HYDROCHLORIDE 0.5 MG: 1 INJECTION, SOLUTION INTRAMUSCULAR; INTRAVENOUS; SUBCUTANEOUS at 10:30

## 2019-06-02 RX ADMIN — METHOCARBAMOL TABLETS 1500 MG: 750 TABLET, COATED ORAL at 09:28

## 2019-06-02 RX ADMIN — METHOCARBAMOL TABLETS 1500 MG: 750 TABLET, COATED ORAL at 16:34

## 2019-06-02 RX ADMIN — Medication: at 21:35

## 2019-06-02 RX ADMIN — MAGNESIUM HYDROXIDE 30 ML: 400 SUSPENSION ORAL at 18:01

## 2019-06-02 RX ADMIN — OXYCODONE HYDROCHLORIDE 10 MG: 10 TABLET ORAL at 02:41

## 2019-06-02 RX ADMIN — Medication 10 ML: at 17:22

## 2019-06-02 RX ADMIN — ACETAMINOPHEN 650 MG: 325 TABLET, FILM COATED ORAL at 09:28

## 2019-06-02 RX ADMIN — ACETAMINOPHEN 650 MG: 325 TABLET, FILM COATED ORAL at 13:52

## 2019-06-02 RX ADMIN — Medication: at 13:52

## 2019-06-02 RX ADMIN — OXYCODONE HYDROCHLORIDE 10 MG: 10 TABLET ORAL at 12:17

## 2019-06-02 RX ADMIN — HYDROMORPHONE HYDROCHLORIDE 0.5 MG: 1 INJECTION, SOLUTION INTRAMUSCULAR; INTRAVENOUS; SUBCUTANEOUS at 18:01

## 2019-06-02 RX ADMIN — ENOXAPARIN SODIUM 30 MG: 30 INJECTION SUBCUTANEOUS at 11:28

## 2019-06-02 RX ADMIN — Medication 10 ML: at 18:04

## 2019-06-02 RX ADMIN — ONDANSETRON HYDROCHLORIDE 4 MG: 2 SOLUTION INTRAMUSCULAR; INTRAVENOUS at 17:22

## 2019-06-02 RX ADMIN — Medication 10 ML: at 08:46

## 2019-06-02 RX ADMIN — ATORVASTATIN CALCIUM 40 MG: 40 TABLET, FILM COATED ORAL at 21:32

## 2019-06-02 RX ADMIN — DOCUSATE SODIUM 100 MG: 100 CAPSULE, LIQUID FILLED ORAL at 08:46

## 2019-06-02 RX ADMIN — SENNOSIDES 8.6 MG: 8.6 TABLET, FILM COATED ORAL at 21:33

## 2019-06-02 RX ADMIN — HYDROMORPHONE HYDROCHLORIDE 0.5 MG: 1 INJECTION, SOLUTION INTRAMUSCULAR; INTRAVENOUS; SUBCUTANEOUS at 21:47

## 2019-06-02 RX ADMIN — DOCUSATE SODIUM 100 MG: 100 CAPSULE, LIQUID FILLED ORAL at 21:33

## 2019-06-02 RX ADMIN — Medication 10 ML: at 21:35

## 2019-06-02 RX ADMIN — SODIUM CHLORIDE: 9 INJECTION, SOLUTION INTRAVENOUS at 00:29

## 2019-06-02 ASSESSMENT — PAIN SCALES - GENERAL
PAINLEVEL_OUTOF10: 7
PAINLEVEL_OUTOF10: 5
PAINLEVEL_OUTOF10: 8
PAINLEVEL_OUTOF10: 6
PAINLEVEL_OUTOF10: 10
PAINLEVEL_OUTOF10: 5
PAINLEVEL_OUTOF10: 0
PAINLEVEL_OUTOF10: 10
PAINLEVEL_OUTOF10: 4
PAINLEVEL_OUTOF10: 6
PAINLEVEL_OUTOF10: 5
PAINLEVEL_OUTOF10: 0
PAINLEVEL_OUTOF10: 7

## 2019-06-02 ASSESSMENT — PAIN DESCRIPTION - PROGRESSION
CLINICAL_PROGRESSION: GRADUALLY WORSENING
CLINICAL_PROGRESSION: GRADUALLY IMPROVING
CLINICAL_PROGRESSION: GRADUALLY WORSENING
CLINICAL_PROGRESSION: NOT CHANGED
CLINICAL_PROGRESSION: GRADUALLY WORSENING
CLINICAL_PROGRESSION: GRADUALLY WORSENING

## 2019-06-02 ASSESSMENT — PAIN DESCRIPTION - PAIN TYPE
TYPE: ACUTE PAIN

## 2019-06-02 ASSESSMENT — PAIN DESCRIPTION - DIRECTION
RADIATING_TOWARDS: LEFT SHOULDER
RADIATING_TOWARDS: LEFT SHOULDER

## 2019-06-02 ASSESSMENT — PAIN DESCRIPTION - LOCATION
LOCATION: RIB CAGE

## 2019-06-02 ASSESSMENT — PAIN - FUNCTIONAL ASSESSMENT
PAIN_FUNCTIONAL_ASSESSMENT: PREVENTS OR INTERFERES SOME ACTIVE ACTIVITIES AND ADLS
PAIN_FUNCTIONAL_ASSESSMENT: ACTIVITIES ARE NOT PREVENTED

## 2019-06-02 ASSESSMENT — PAIN DESCRIPTION - DESCRIPTORS
DESCRIPTORS: CONSTANT;DISCOMFORT;SHARP
DESCRIPTORS: CONSTANT;DISCOMFORT;SHARP

## 2019-06-02 ASSESSMENT — PAIN DESCRIPTION - ORIENTATION
ORIENTATION: LEFT

## 2019-06-02 ASSESSMENT — PAIN DESCRIPTION - FREQUENCY
FREQUENCY: CONTINUOUS
FREQUENCY: CONTINUOUS

## 2019-06-02 ASSESSMENT — PAIN DESCRIPTION - ONSET
ONSET: ON-GOING
ONSET: ON-GOING

## 2019-06-02 NOTE — PLAN OF CARE
Problem: Falls - Risk of:  Goal: Will remain free from falls  Description  Will remain free from falls  Outcome: Met This Shift  Goal: Absence of physical injury  Description  Absence of physical injury  Outcome: Met This Shift     Problem: Pain:  Goal: Pain level will decrease  Description  Pain level will decrease  Outcome: Met This Shift  Goal: Control of acute pain  Description  Control of acute pain  Outcome: Met This Shift     Problem: Anxiety/Stress:  Goal: Level of anxiety will decrease  Description  Level of anxiety will decrease  Outcome: Met This Shift     Problem: Fluid Volume - Imbalance:  Goal: Absence of imbalanced fluid volume signs and symptoms  Description  Absence of imbalanced fluid volume signs and symptoms  Outcome: Met This Shift     Problem: Nutrition Deficit:  Goal: Ability to achieve adequate nutritional intake will improve  Description  Ability to achieve adequate nutritional intake will improve  Outcome: Met This Shift

## 2019-06-03 ENCOUNTER — APPOINTMENT (OUTPATIENT)
Dept: CT IMAGING | Age: 49
DRG: 650 | End: 2019-06-03
Payer: COMMERCIAL

## 2019-06-03 LAB
ALBUMIN SERPL-MCNC: 3 G/DL (ref 3.5–5.2)
ALP BLD-CCNC: 49 U/L (ref 40–129)
ALT SERPL-CCNC: 7 U/L (ref 0–40)
ANION GAP SERPL CALCULATED.3IONS-SCNC: 11 MMOL/L (ref 7–16)
AST SERPL-CCNC: 14 U/L (ref 0–39)
BILIRUB SERPL-MCNC: <0.2 MG/DL (ref 0–1.2)
BILIRUBIN DIRECT: <0.2 MG/DL (ref 0–0.3)
BILIRUBIN, INDIRECT: ABNORMAL MG/DL (ref 0–1)
BUN BLDV-MCNC: 9 MG/DL (ref 6–20)
CALCIUM SERPL-MCNC: 7 MG/DL (ref 8.6–10.2)
CHLORIDE BLD-SCNC: 112 MMOL/L (ref 98–107)
CO2: 22 MMOL/L (ref 22–29)
CREAT SERPL-MCNC: 0.8 MG/DL (ref 0.7–1.2)
GFR AFRICAN AMERICAN: >60
GFR NON-AFRICAN AMERICAN: >60 ML/MIN/1.73
GLUCOSE BLD-MCNC: 98 MG/DL (ref 74–99)
HCT VFR BLD CALC: 30.2 % (ref 37–54)
HEMOGLOBIN: 9.9 G/DL (ref 12.5–16.5)
MAGNESIUM: 1.7 MG/DL (ref 1.6–2.6)
MCH RBC QN AUTO: 29.4 PG (ref 26–35)
MCHC RBC AUTO-ENTMCNC: 32.8 % (ref 32–34.5)
MCV RBC AUTO: 89.6 FL (ref 80–99.9)
PDW BLD-RTO: 13.1 FL (ref 11.5–15)
PLATELET # BLD: 166 E9/L (ref 130–450)
PMV BLD AUTO: 11 FL (ref 7–12)
POTASSIUM REFLEX MAGNESIUM: 3.1 MMOL/L (ref 3.5–5)
RBC # BLD: 3.37 E12/L (ref 3.8–5.8)
SODIUM BLD-SCNC: 145 MMOL/L (ref 132–146)
TOTAL PROTEIN: 5.2 G/DL (ref 6.4–8.3)
WBC # BLD: 6.8 E9/L (ref 4.5–11.5)

## 2019-06-03 PROCEDURE — 97166 OT EVAL MOD COMPLEX 45 MIN: CPT

## 2019-06-03 PROCEDURE — 97535 SELF CARE MNGMENT TRAINING: CPT

## 2019-06-03 PROCEDURE — 6360000002 HC RX W HCPCS: Performed by: STUDENT IN AN ORGANIZED HEALTH CARE EDUCATION/TRAINING PROGRAM

## 2019-06-03 PROCEDURE — 1200000000 HC SEMI PRIVATE

## 2019-06-03 PROCEDURE — 6370000000 HC RX 637 (ALT 250 FOR IP): Performed by: STUDENT IN AN ORGANIZED HEALTH CARE EDUCATION/TRAINING PROGRAM

## 2019-06-03 PROCEDURE — 80048 BASIC METABOLIC PNL TOTAL CA: CPT

## 2019-06-03 PROCEDURE — 74177 CT ABD & PELVIS W/CONTRAST: CPT

## 2019-06-03 PROCEDURE — 36415 COLL VENOUS BLD VENIPUNCTURE: CPT

## 2019-06-03 PROCEDURE — 80076 HEPATIC FUNCTION PANEL: CPT

## 2019-06-03 PROCEDURE — 2580000003 HC RX 258: Performed by: STUDENT IN AN ORGANIZED HEALTH CARE EDUCATION/TRAINING PROGRAM

## 2019-06-03 PROCEDURE — 85027 COMPLETE CBC AUTOMATED: CPT

## 2019-06-03 PROCEDURE — 99232 SBSQ HOSP IP/OBS MODERATE 35: CPT | Performed by: SURGERY

## 2019-06-03 PROCEDURE — 6360000004 HC RX CONTRAST MEDICATION: Performed by: RADIOLOGY

## 2019-06-03 PROCEDURE — 83735 ASSAY OF MAGNESIUM: CPT

## 2019-06-03 PROCEDURE — 97162 PT EVAL MOD COMPLEX 30 MIN: CPT

## 2019-06-03 PROCEDURE — 97530 THERAPEUTIC ACTIVITIES: CPT

## 2019-06-03 RX ORDER — MAGNESIUM SULFATE IN WATER 40 MG/ML
2 INJECTION, SOLUTION INTRAVENOUS ONCE
Status: COMPLETED | OUTPATIENT
Start: 2019-06-03 | End: 2019-06-03

## 2019-06-03 RX ORDER — POTASSIUM CHLORIDE 20 MEQ/1
40 TABLET, EXTENDED RELEASE ORAL
Status: COMPLETED | OUTPATIENT
Start: 2019-06-03 | End: 2019-06-03

## 2019-06-03 RX ORDER — SODIUM CHLORIDE 0.9 % (FLUSH) 0.9 %
10 SYRINGE (ML) INJECTION PRN
Status: DISCONTINUED | OUTPATIENT
Start: 2019-06-03 | End: 2019-06-03 | Stop reason: SDUPTHER

## 2019-06-03 RX ADMIN — HYDROMORPHONE HYDROCHLORIDE 0.5 MG: 1 INJECTION, SOLUTION INTRAMUSCULAR; INTRAVENOUS; SUBCUTANEOUS at 09:31

## 2019-06-03 RX ADMIN — ATORVASTATIN CALCIUM 40 MG: 40 TABLET, FILM COATED ORAL at 20:19

## 2019-06-03 RX ADMIN — ACETAMINOPHEN 650 MG: 325 TABLET, FILM COATED ORAL at 09:52

## 2019-06-03 RX ADMIN — Medication 10 ML: at 09:32

## 2019-06-03 RX ADMIN — HYDROMORPHONE HYDROCHLORIDE 0.5 MG: 1 INJECTION, SOLUTION INTRAMUSCULAR; INTRAVENOUS; SUBCUTANEOUS at 16:30

## 2019-06-03 RX ADMIN — ONDANSETRON HYDROCHLORIDE 4 MG: 2 SOLUTION INTRAMUSCULAR; INTRAVENOUS at 09:32

## 2019-06-03 RX ADMIN — METHOCARBAMOL TABLETS 1500 MG: 750 TABLET, COATED ORAL at 17:14

## 2019-06-03 RX ADMIN — ACETAMINOPHEN 650 MG: 325 TABLET, FILM COATED ORAL at 18:08

## 2019-06-03 RX ADMIN — METHOCARBAMOL TABLETS 1500 MG: 750 TABLET, COATED ORAL at 13:15

## 2019-06-03 RX ADMIN — OXYCODONE HYDROCHLORIDE 5 MG: 5 TABLET ORAL at 20:19

## 2019-06-03 RX ADMIN — MAGNESIUM SULFATE 2 G: 2 INJECTION INTRAVENOUS at 09:52

## 2019-06-03 RX ADMIN — IOPAMIDOL 110 ML: 755 INJECTION, SOLUTION INTRAVENOUS at 16:52

## 2019-06-03 RX ADMIN — METHOCARBAMOL TABLETS 1500 MG: 750 TABLET, COATED ORAL at 09:39

## 2019-06-03 RX ADMIN — Medication 10 ML: at 20:21

## 2019-06-03 RX ADMIN — Medication: at 09:36

## 2019-06-03 RX ADMIN — Medication: at 14:00

## 2019-06-03 RX ADMIN — POTASSIUM CHLORIDE 40 MEQ: 20 TABLET, EXTENDED RELEASE ORAL at 09:42

## 2019-06-03 RX ADMIN — Medication: at 20:20

## 2019-06-03 RX ADMIN — ONDANSETRON HYDROCHLORIDE 4 MG: 2 SOLUTION INTRAMUSCULAR; INTRAVENOUS at 21:22

## 2019-06-03 RX ADMIN — DOCUSATE SODIUM 100 MG: 100 CAPSULE, LIQUID FILLED ORAL at 09:36

## 2019-06-03 RX ADMIN — HYDROMORPHONE HYDROCHLORIDE 0.5 MG: 1 INJECTION, SOLUTION INTRAMUSCULAR; INTRAVENOUS; SUBCUTANEOUS at 01:49

## 2019-06-03 RX ADMIN — Medication 10 ML: at 04:54

## 2019-06-03 RX ADMIN — ENOXAPARIN SODIUM 30 MG: 30 INJECTION SUBCUTANEOUS at 20:19

## 2019-06-03 RX ADMIN — HYDROMORPHONE HYDROCHLORIDE 0.5 MG: 1 INJECTION, SOLUTION INTRAMUSCULAR; INTRAVENOUS; SUBCUTANEOUS at 19:19

## 2019-06-03 RX ADMIN — DOCUSATE SODIUM 100 MG: 100 CAPSULE, LIQUID FILLED ORAL at 20:19

## 2019-06-03 RX ADMIN — ACETAMINOPHEN 650 MG: 325 TABLET, FILM COATED ORAL at 22:30

## 2019-06-03 RX ADMIN — Medication 10 ML: at 09:00

## 2019-06-03 RX ADMIN — POTASSIUM CHLORIDE 40 MEQ: 20 TABLET, EXTENDED RELEASE ORAL at 13:15

## 2019-06-03 RX ADMIN — ONDANSETRON HYDROCHLORIDE 4 MG: 2 SOLUTION INTRAMUSCULAR; INTRAVENOUS at 15:47

## 2019-06-03 RX ADMIN — OXYCODONE HYDROCHLORIDE 10 MG: 10 TABLET ORAL at 11:00

## 2019-06-03 RX ADMIN — ENOXAPARIN SODIUM 30 MG: 30 INJECTION SUBCUTANEOUS at 09:00

## 2019-06-03 RX ADMIN — ACETAMINOPHEN 650 MG: 325 TABLET, FILM COATED ORAL at 14:15

## 2019-06-03 RX ADMIN — SENNOSIDES 8.6 MG: 8.6 TABLET, FILM COATED ORAL at 20:19

## 2019-06-03 RX ADMIN — METHOCARBAMOL TABLETS 1500 MG: 750 TABLET, COATED ORAL at 20:20

## 2019-06-03 RX ADMIN — Medication 10 ML: at 16:52

## 2019-06-03 ASSESSMENT — PAIN SCALES - GENERAL
PAINLEVEL_OUTOF10: 0
PAINLEVEL_OUTOF10: 2
PAINLEVEL_OUTOF10: 2
PAINLEVEL_OUTOF10: 7
PAINLEVEL_OUTOF10: 0
PAINLEVEL_OUTOF10: 0
PAINLEVEL_OUTOF10: 5
PAINLEVEL_OUTOF10: 2
PAINLEVEL_OUTOF10: 8
PAINLEVEL_OUTOF10: 0
PAINLEVEL_OUTOF10: 6
PAINLEVEL_OUTOF10: 7
PAINLEVEL_OUTOF10: 5
PAINLEVEL_OUTOF10: 6
PAINLEVEL_OUTOF10: 7
PAINLEVEL_OUTOF10: 0
PAINLEVEL_OUTOF10: 8
PAINLEVEL_OUTOF10: 0
PAINLEVEL_OUTOF10: 7

## 2019-06-03 ASSESSMENT — PAIN DESCRIPTION - DESCRIPTORS
DESCRIPTORS: ACHING;SHARP;SORE
DESCRIPTORS: ACHING;DISCOMFORT;SPASM
DESCRIPTORS: ACHING;DISCOMFORT;SORE;SHOOTING
DESCRIPTORS: ACHING;DISCOMFORT;SHARP;SHOOTING
DESCRIPTORS: ACHING;SORE;SPASM

## 2019-06-03 ASSESSMENT — PAIN DESCRIPTION - PROGRESSION
CLINICAL_PROGRESSION: GRADUALLY IMPROVING
CLINICAL_PROGRESSION: GRADUALLY IMPROVING
CLINICAL_PROGRESSION: GRADUALLY WORSENING
CLINICAL_PROGRESSION: GRADUALLY WORSENING
CLINICAL_PROGRESSION: GRADUALLY IMPROVING

## 2019-06-03 ASSESSMENT — PAIN - FUNCTIONAL ASSESSMENT
PAIN_FUNCTIONAL_ASSESSMENT: ACTIVITIES ARE NOT PREVENTED

## 2019-06-03 ASSESSMENT — PAIN DESCRIPTION - PAIN TYPE
TYPE: ACUTE PAIN

## 2019-06-03 ASSESSMENT — PAIN DESCRIPTION - ONSET
ONSET: ON-GOING

## 2019-06-03 ASSESSMENT — PAIN DESCRIPTION - LOCATION
LOCATION: RIB CAGE

## 2019-06-03 ASSESSMENT — PAIN DESCRIPTION - ORIENTATION
ORIENTATION: LEFT

## 2019-06-03 ASSESSMENT — PAIN DESCRIPTION - FREQUENCY
FREQUENCY: CONTINUOUS
FREQUENCY: INTERMITTENT
FREQUENCY: INTERMITTENT
FREQUENCY: CONTINUOUS
FREQUENCY: INTERMITTENT

## 2019-06-03 NOTE — CARE COORDINATION
SOCIAL WORK/CASEMANAGEMENT TRANSITION OF CARE PLANNING: I met with pt wife. The wife is not working and filing for disability. The pt works for a [employer] and said that he will not be able to return to his job since he is not allow to lift more than a certain amount for 6 weeks. He was asking for assistance and is afraid of not being able to pay his rent. Pt lives in a apt on the 2nd floor with 15 steps up. He was independent with no dme or hhc. Pt has no pcp or insurance. He said public benefits was to file for caresource for him since he was on it before. Pt is hcap with meds on discharge. I told pt to check with the [facility] to see if there is any assistance there. Plan is home with no needs.  Derrick Alicea  6/3/2019

## 2019-06-03 NOTE — PROGRESS NOTES
pacing and to carryout proper breathing tecniques      G safety noted during ADLs and functional mobility; no VC required   BUE strength/endurance See below  G tolerance to LUE AAROM/AROM exercise to improve overall UE use during ADLs and functional tasks     Hand dominance: right      Strength ROM  Comments   RUE  Proximal: 5/5  Distal: 5/5 Proximal:  -PROM: WFL   -AROM: WFL  Distal: WFL G   G FMC/dexterity noted during ADL tasks   LUE Deferred MMT d/t L side pain. Proximal:  -PROM: WFL stiffness and pain L ribs   -AROM: 90 degrees shoulder flexion; internal/external rotation WFL  Distal: WFL G   G FMC/dexterity noted during ADL tasks      H[facility]: [facility]   Sensation:  Pt denies any numbness or tingling in BUE/BLE. Tone: WFL  Edema: Unremarkable    Vitals:   BP at rest: 135/82 BP at end of session: 124/83   HR at rest: 66 bpm HR at end of session: 72 bpm   Spo2 at rest: 92% 2L  Spo2 at end of session: 91% 2L                             Comments/Treatment Narrative: OK from RN to see patient. Thorough chart review and evaluation completed with PT collaboration. Upon arrival patient supine with HOB slightly elevated. Patient agreeable to session. Wife present during session. Vitals monitored continuously during session. Pt overall limited by L side rib pain. Pt setup with urinal at bed level however unable to void. Educated pt on c-collar and cervical spine precautions with F understanding- pt frequently asking to remove C-collar during session. . After bed mobility, pt transferred to bedside arm for performance of LB dressing as stated above. Educated pt to cross legs versus bending to rudy/doff socks however difficult d/t L side rib pain. Pt ambulated household distances at min A using no device; c/o SOB and being lightheaded, impulsive and noted LOB when changing direction, turning head ,etc. Educated pt on pacing and proper breathing techniques with F carryover.  Ambulated to sink for completion of grooming tasks as stated above. Returned to bedside arm chair with  with all devices within reach. Pt required cues and education as noted above for safe facilitation and completion of tasks. During functional activites and ADLs pt educated on spinal precautions, c-collar use, proper hand placement, safety technique, sequencing, and energy conservation/pacing/breathing techniques. Pt additionally educated on OT POC, OT role, OT d/c plan, importance of OOB activity and completing ADL tasks daily as IND as possible to aide in recovery process, fall risk precautions/call light use. .Therapist provided skilled monitoring of HR, O2 saturation, blood pressure and patients response during treatment session. Prior to and at the end of session, environmental modifications/line management completed for patients safety and efficiency of treatment session. Eval Complexity:   · Moderate Complexity  · History: Expanded review of medical records and additional review of physical, cognitive, or psychosocial history related to current functional performance  · Exam: 3+ performance deficits  · Assistance/Modification: Min/mod assistance or modifications required to perform tasks. May have comorbidities that affect occupational performance.     Assessment of current deficits:   Functional mobility [x]  ADLs [x] Strength [x]  Cognition []  Functional transfers  [x] IADLs [x] Safety Awareness [x]  Endurance [x]  Fine Motor Coordination [] Balance [x] Vision/perception [] Sensation []   Gross Motor Coordination [] ROM [x] Delirium []                  Motor Control []    Plan of Care:  ADL retraining [x]   Equipment needs [x]   Neuromuscular re-education [x] Energy Conservation Techniques [x]  Functional Transfer training [x] Patient and/or Family Education [x]  Functional Mobility training [x]  Environmental Modifications [x]  Cognitive re-training []   Compensatory techniques for ADLs [x]  Splinting Needs []   Positioning to improve overall function [x]   Therapeutic Activity [x]                       Therapeutic Exercise  [x]  Visual/Perceptual: []    Delirium prevention/treatment  []   Other:  []    Rehab Potential: Good for established goals    Patient / Family Goal: Return home with wife    Patient and/or family were instructed/educated on diagnosis, prognosis/goals and plan of care. Demonstrated good understanding, further information not needed. [] Malnutrition indicators have been identified and nursing has been notified to ensure a dietitian consult is ordered. Evaluation time includes thorough review of current medical information, gathering information on past medical & social history & PLOF, completion of standardized testing, informal observation of tasks, consultation with other medical professions/disciplines, assessment of data & development of POC/goals.      Moderate evaluation + 20 treatment minutes  Time in: 09:05  Time out: 09:25    Kale Harrison, OTR/L 1881

## 2019-06-03 NOTE — PLAN OF CARE
Problem: Falls - Risk of:  Goal: Will remain free from falls  Description  Will remain free from falls  6/2/2019 2304 by Jasen Flores RN  Outcome: Met This Shift  6/2/2019 1115 by Shannon Kwon RN  Outcome: Met This Shift  Goal: Absence of physical injury  Description  Absence of physical injury  Outcome: Met This Shift     Problem: Pain:  Goal: Pain level will decrease  Description  Pain level will decrease  6/2/2019 2304 by Jasen Flores RN  Outcome: Met This Shift  6/2/2019 1620 by Shannon Kwon RN  Outcome: Ongoing  Goal: Control of acute pain  Description  Control of acute pain  6/2/2019 2304 by Jasen Flores RN  Outcome: Met This Shift  6/2/2019 1620 by Shannon Kwon RN  Outcome: Ongoing     Problem: Anxiety/Stress:  Goal: Level of anxiety will decrease  Description  Level of anxiety will decrease  Outcome: Met This Shift     Problem: Fluid Volume - Imbalance:  Goal: Absence of imbalanced fluid volume signs and symptoms  Description  Absence of imbalanced fluid volume signs and symptoms  Outcome: Met This Shift     Problem: Nutrition Deficit:  Goal: Ability to achieve adequate nutritional intake will improve  Description  Ability to achieve adequate nutritional intake will improve  Outcome: Met This Shift     Problem: Skin Integrity - Impaired:  Goal: Will show no infection signs and symptoms  Description  Will show no infection signs and symptoms  Outcome: Met This Shift  Goal: Absence of new skin breakdown  Description  Absence of new skin breakdown  Outcome: Met This Shift

## 2019-06-03 NOTE — PROGRESS NOTES
Physical Therapy    Facility/Department: [address]  Initial Assessment    NAME: Maite Sauer  : 1970  MRN: [id number]    Date of Service: 6/3/2019  Evaluating Therapist: Macho March PT, DPT    ROOM #: [id number]  DIAGNOSIS: Hematoma of spleen  PRECAUTIONS: Falls, C-collar, c-spine precautions  PMHx: GERD  PROCEDURES: NA    Social:  Pt lives with wife in a 2nd floor apartment with 4 step(s) and 1 rail(s) to enter. Once inside, >10 steps and 1 rail to apartment. Prior to admission pt walked with no device and was Independent. Pt works in construction. Initial Evaluation  Date: 6/3/19 Treatment  Date:     Short Term/ Long Term   Goals   AM-PAC 6 Clicks /94     Does pt have pain? 5/10 L flank pain     Bed Mobility  Rolling: NT  Supine to sit: SBA  Sit to supine: NT  Scooting: SBA  Mod Independent   Transfers Sit to stand: SBA  Stand to sit: SBA  Stand pivot: Felix no device  Independent   Ambulation   200 feet with Felix with no device  >400 feet Independently   Stair negotiation: ascended and descended NT  >10 steps with 1 rail with Mod Independent   BLE ROM WNL     BLE strength Grossly 5/5     Balance Sitting: SBA  Standing: Felix no device  Sitting: Independent  Standing: Independent     Vitals:  Heart Rate at rest 70 bpm Heart Rate post session 72 bpm   SpO2 at rest 95% SpO2 post session 91%   Blood Pressure at rest 135/82 mmHg Blood Pressure post session 124/83 mmHg     Functional Status Score-Intensive Care Unit (FSS-ICU)   Rolling 5/7   Supine to sit transfer 5/7   Unsupported sitting  5/7   Sit to stand transfers 5/7   Ambulation 4/7   Total  24/35     Pt is alert and oriented x 4  CAM-ICU: NT  RASS: 0  Sensation: WNL  Edema: WNL    ASSESSMENT  Pt displays functional ability as noted in the objective portion of this evaluation. Comments/Treatment:  RN reported pt was stable for session. Pt was supine in bed upon arrival, agreeable to initial evaluation.   Pt was educated on cervical precautions prior to activity. Pt was very impulsive and requested to have c-collar removed. Educated pt on importance of collar at this time. Pt reported increased pain with mobility and exhibited the valsalva maneuver frequently throughout activity despite VCs for PLB. Pt reported mild lightheadedness with ambulation and was mildly unsteady with quick movements or head turns. 3 minor LOBs noted and pt required assistance to correct. Pt returned to sitting in bedside chair with all needs met and call light in reach. Pt is mostly limited by pain at this time. Pt required continues education for precautions and PLB. Patient education  Pt educated on safety    Patient response to education:   Pt verbalized understanding Pt demonstrated skill Pt requires further education in this area   x partial Reinforce     Pts/ family goals   1. Return home    Patient and or family understand(s) diagnosis, prognosis, and plan of care. PLAN  PT care will be provided in accordance with the objectives noted above. Whenever appropriate, clear delegation orders will be provided for nursing staff. Exercises and functional mobility practice will be used as well as appropriate assistive devices or modalities to obtain goals. Patient and family education will also be administered as needed. Frequency of treatments will be 2-5x/week as able.     Time in: 0900  Time out: [address], PT, DPT  [id number]

## 2019-06-03 NOTE — PROGRESS NOTES
Surgical Intensive Care Unit   Daily Progress Note     Date of admission: 5/31/2019    Reason for ICU: Critical care management after fall; spleen lac      Pertinent Hospital Course Events:   5/31--admitted after fall; grade III spleen lac  6/1--nothing new overnight  6/2--no new issues  6/3-- no issues overnight      Overnight Events: no issues overnight    Subjective: Feeling well, no current pain    Physical Exam:   BP (!) 110/59   Pulse 53   Temp 97.9 °F (36.6 °C) (Temporal)   Resp 19   Ht 6' (1.829 m)   Wt 187 lb 11.2 oz (85.1 kg)   SpO2 98%   BMI 25.46 kg/m²     I/O last 3 completed shifts: In: 2508 [P.O.:1220; I.V.:1288]  Out: 2800 [Urine:2800]  I/O this shift:  In: 10 [I.V.:10]  Out: 1025 [Urine:1025]    General: No apparent distress, comfortable  HEENT: Trachea midline, no masses, Pupils equal round  Neck:  c-collar in place   Chest: Respiratory effort was normal with no retractions or use of accessory muscles. Cardiovascular: Heart sounds were normal with a regular rate  Abdomen:  Soft; BS active; diffusely tender; no rebound; no guarding  Extremities: Moves all 4 extremeties, No pedal edema      Assessment/Plan:     Neuro: GCS 15, pain control  CV: regular rate, no acute issues  Pulm: Left sided rib fx--pain control; IS/cough/deep breathing. On 2L nc  GI:,  Grade III spleen lac with hemoperitoneum. Repeat CT A/P iv contrast. Mild hypokalemia, hypocalcemia. regular diet. bowel regimen. Renal: Creatinine within normal limits, no edema indicating fluid overload. ID: afebrile  Endo: glucose near normal range, no acute issues  MSK: Cervical strain--maintain C-collar.  Repeat CT cervical  Heme: hemoperitoneum - monitor H/H and vitals, SCDs      Code status:  Full Code    Disposition:  Continue ICU    Electronically signed by Daniel Dempsey DO on 6/3/2019 at 6:05 AM

## 2019-06-03 NOTE — PROGRESS NOTES
[facility]  Critical Care Attending Note    ICU Adm Date:  5/31/10    Diagnosis:  Fall with multiple rib fxs and Grade III splenic rupture with hemoperitoneum    Significant pre-existing medical conditions: none    Surgical Procedures: none    Recent Events: stable, no new problems    Physical Exam:   BP (!) 143/74   Pulse 63   Temp 97.9 °F (36.6 °C) (Oral)   Resp 13   Ht 6' (1.829 m)   Wt 185 lb (83.9 kg)   SpO2 93%   BMI 25.09 kg/m²   Alert and oriented, purposeful  Unlabored respirations, bilateral breath sounds. Tender left lateral chest wall. Regular rhythm, normal sounds  Flat, soft mildly tender abdomen. Problems and Plan:   Rib fractures, pain control and incentive spirometer. Splenic rupture with hemoperitoneum. Monitor. Repeat CT scan. Acute blood loss anemia, Hb-6.8. Transfuse one unit. Attestation: Reviewed Surgical Residents documentation and agree with the evaluation and plan.

## 2019-06-04 ENCOUNTER — APPOINTMENT (OUTPATIENT)
Dept: MRI IMAGING | Age: 49
DRG: 650 | End: 2019-06-04
Payer: COMMERCIAL

## 2019-06-04 LAB
ANION GAP SERPL CALCULATED.3IONS-SCNC: 11 MMOL/L (ref 7–16)
BUN BLDV-MCNC: 18 MG/DL (ref 6–20)
CALCIUM SERPL-MCNC: 9.2 MG/DL (ref 8.6–10.2)
CHLORIDE BLD-SCNC: 100 MMOL/L (ref 98–107)
CO2: 26 MMOL/L (ref 22–29)
CREAT SERPL-MCNC: 1.2 MG/DL (ref 0.7–1.2)
GFR AFRICAN AMERICAN: >60
GFR NON-AFRICAN AMERICAN: >60 ML/MIN/1.73
GLUCOSE BLD-MCNC: 109 MG/DL (ref 74–99)
HCT VFR BLD CALC: 33.7 % (ref 37–54)
HEMOGLOBIN: 11.2 G/DL (ref 12.5–16.5)
MCH RBC QN AUTO: 29.4 PG (ref 26–35)
MCHC RBC AUTO-ENTMCNC: 33.2 % (ref 32–34.5)
MCV RBC AUTO: 88.5 FL (ref 80–99.9)
PDW BLD-RTO: 13 FL (ref 11.5–15)
PLATELET # BLD: 189 E9/L (ref 130–450)
PMV BLD AUTO: 11.1 FL (ref 7–12)
POTASSIUM REFLEX MAGNESIUM: 4.3 MMOL/L (ref 3.5–5)
RBC # BLD: 3.81 E12/L (ref 3.8–5.8)
SODIUM BLD-SCNC: 137 MMOL/L (ref 132–146)
WBC # BLD: 8.4 E9/L (ref 4.5–11.5)

## 2019-06-04 PROCEDURE — 6360000002 HC RX W HCPCS: Performed by: STUDENT IN AN ORGANIZED HEALTH CARE EDUCATION/TRAINING PROGRAM

## 2019-06-04 PROCEDURE — 6370000000 HC RX 637 (ALT 250 FOR IP): Performed by: STUDENT IN AN ORGANIZED HEALTH CARE EDUCATION/TRAINING PROGRAM

## 2019-06-04 PROCEDURE — 72141 MRI NECK SPINE W/O DYE: CPT

## 2019-06-04 PROCEDURE — 2580000003 HC RX 258: Performed by: STUDENT IN AN ORGANIZED HEALTH CARE EDUCATION/TRAINING PROGRAM

## 2019-06-04 PROCEDURE — 36415 COLL VENOUS BLD VENIPUNCTURE: CPT

## 2019-06-04 PROCEDURE — 80048 BASIC METABOLIC PNL TOTAL CA: CPT

## 2019-06-04 PROCEDURE — 1200000000 HC SEMI PRIVATE

## 2019-06-04 PROCEDURE — 99232 SBSQ HOSP IP/OBS MODERATE 35: CPT | Performed by: SURGERY

## 2019-06-04 PROCEDURE — 99232 SBSQ HOSP IP/OBS MODERATE 35: CPT | Performed by: NURSE PRACTITIONER

## 2019-06-04 PROCEDURE — 85027 COMPLETE CBC AUTOMATED: CPT

## 2019-06-04 RX ORDER — LIDOCAINE 4 G/G
1 PATCH TOPICAL DAILY
Status: DISCONTINUED | OUTPATIENT
Start: 2019-06-04 | End: 2019-06-14 | Stop reason: HOSPADM

## 2019-06-04 RX ADMIN — ACETAMINOPHEN 650 MG: 325 TABLET, FILM COATED ORAL at 14:58

## 2019-06-04 RX ADMIN — ACETAMINOPHEN 650 MG: 325 TABLET, FILM COATED ORAL at 01:17

## 2019-06-04 RX ADMIN — METHOCARBAMOL TABLETS 1500 MG: 750 TABLET, COATED ORAL at 21:13

## 2019-06-04 RX ADMIN — Medication: at 21:17

## 2019-06-04 RX ADMIN — METHOCARBAMOL TABLETS 1500 MG: 750 TABLET, COATED ORAL at 09:11

## 2019-06-04 RX ADMIN — METHOCARBAMOL TABLETS 1500 MG: 750 TABLET, COATED ORAL at 14:59

## 2019-06-04 RX ADMIN — OXYCODONE HYDROCHLORIDE 5 MG: 5 TABLET ORAL at 01:17

## 2019-06-04 RX ADMIN — DOCUSATE SODIUM 100 MG: 100 CAPSULE, LIQUID FILLED ORAL at 21:13

## 2019-06-04 RX ADMIN — ACETAMINOPHEN 650 MG: 325 TABLET, FILM COATED ORAL at 21:13

## 2019-06-04 RX ADMIN — ATORVASTATIN CALCIUM 40 MG: 40 TABLET, FILM COATED ORAL at 21:13

## 2019-06-04 RX ADMIN — HYDROMORPHONE HYDROCHLORIDE 0.5 MG: 1 INJECTION, SOLUTION INTRAMUSCULAR; INTRAVENOUS; SUBCUTANEOUS at 19:00

## 2019-06-04 RX ADMIN — METHOCARBAMOL TABLETS 1500 MG: 750 TABLET, COATED ORAL at 17:39

## 2019-06-04 RX ADMIN — ENOXAPARIN SODIUM 30 MG: 30 INJECTION SUBCUTANEOUS at 21:13

## 2019-06-04 RX ADMIN — MAGNESIUM HYDROXIDE 30 ML: 400 SUSPENSION ORAL at 01:19

## 2019-06-04 RX ADMIN — DOCUSATE SODIUM 100 MG: 100 CAPSULE, LIQUID FILLED ORAL at 09:09

## 2019-06-04 RX ADMIN — Medication: at 10:28

## 2019-06-04 RX ADMIN — ENOXAPARIN SODIUM 30 MG: 30 INJECTION SUBCUTANEOUS at 09:23

## 2019-06-04 RX ADMIN — Medication 10 ML: at 09:18

## 2019-06-04 RX ADMIN — HYDROMORPHONE HYDROCHLORIDE 0.5 MG: 1 INJECTION, SOLUTION INTRAMUSCULAR; INTRAVENOUS; SUBCUTANEOUS at 13:22

## 2019-06-04 RX ADMIN — Medication 10 ML: at 19:01

## 2019-06-04 RX ADMIN — Medication 10 ML: at 09:10

## 2019-06-04 RX ADMIN — SENNOSIDES 8.6 MG: 8.6 TABLET, FILM COATED ORAL at 21:13

## 2019-06-04 RX ADMIN — Medication: at 14:59

## 2019-06-04 RX ADMIN — ACETAMINOPHEN 650 MG: 325 TABLET, FILM COATED ORAL at 09:09

## 2019-06-04 RX ADMIN — Medication 10 ML: at 21:14

## 2019-06-04 RX ADMIN — HYDROMORPHONE HYDROCHLORIDE 0.5 MG: 1 INJECTION, SOLUTION INTRAMUSCULAR; INTRAVENOUS; SUBCUTANEOUS at 09:17

## 2019-06-04 RX ADMIN — OXYCODONE HYDROCHLORIDE 10 MG: 10 TABLET ORAL at 17:39

## 2019-06-04 ASSESSMENT — PAIN SCALES - GENERAL
PAINLEVEL_OUTOF10: 5
PAINLEVEL_OUTOF10: 5
PAINLEVEL_OUTOF10: 10
PAINLEVEL_OUTOF10: 5
PAINLEVEL_OUTOF10: 8
PAINLEVEL_OUTOF10: 4
PAINLEVEL_OUTOF10: 7
PAINLEVEL_OUTOF10: 8
PAINLEVEL_OUTOF10: 4
PAINLEVEL_OUTOF10: 0
PAINLEVEL_OUTOF10: 3
PAINLEVEL_OUTOF10: 5
PAINLEVEL_OUTOF10: 10

## 2019-06-04 ASSESSMENT — PAIN DESCRIPTION - PAIN TYPE
TYPE: ACUTE PAIN

## 2019-06-04 ASSESSMENT — PAIN DESCRIPTION - DESCRIPTORS
DESCRIPTORS: ACHING
DESCRIPTORS: ACHING;CONSTANT;SHARP
DESCRIPTORS: ACHING;SHARP;CONSTANT
DESCRIPTORS: ACHING;CONSTANT;DISCOMFORT
DESCRIPTORS: ACHING;CONSTANT;DISCOMFORT
DESCRIPTORS: ACHING;SHARP

## 2019-06-04 ASSESSMENT — PAIN DESCRIPTION - ONSET
ONSET: ON-GOING

## 2019-06-04 ASSESSMENT — PAIN DESCRIPTION - LOCATION
LOCATION: RIB CAGE

## 2019-06-04 ASSESSMENT — PAIN DESCRIPTION - DIRECTION
RADIATING_TOWARDS: LEFT SHOULDER

## 2019-06-04 ASSESSMENT — PAIN DESCRIPTION - PROGRESSION
CLINICAL_PROGRESSION: GRADUALLY IMPROVING

## 2019-06-04 ASSESSMENT — PAIN DESCRIPTION - ORIENTATION
ORIENTATION: LEFT

## 2019-06-04 ASSESSMENT — PAIN - FUNCTIONAL ASSESSMENT
PAIN_FUNCTIONAL_ASSESSMENT: PREVENTS OR INTERFERES SOME ACTIVE ACTIVITIES AND ADLS

## 2019-06-04 ASSESSMENT — PAIN DESCRIPTION - FREQUENCY
FREQUENCY: INTERMITTENT

## 2019-06-04 NOTE — PROGRESS NOTES
[facility]  ATTENDING PHYSICIAN PROGRESS NOTE     I have examined the patient and  reviewed the record. I have reviewed all relevant labs and imaging data. The following summarizes my clinical findings and independent assessment. CC: grade III splenic injury      S. Pt still has left upper quadrant pain and neck pain. He was transferred out of SICU overnight    O.  Vitals:    06/04/19 1500   BP: 134/63   Pulse: 92   Resp: 16   Temp: 97.1 °F (36.2 °C)   SpO2: 95%     NAD  Neck--aspen collar present--persistent c spine tenderness  Abdomen soft nd mild LUQ tenderness  Left chest wall tender    ASSESSMENT:  Active Problems:    Hematoma of spleen, closed, initial encounter    Traumatic hemoperitoneum    Closed traumatic minimally displaced fracture of one rib of left side    Acute blood loss anemia    Laceration of spleen    Multiple closed fractures of ribs of left side  Resolved Problems:    * No resolved hospital problems.  *       PLAN:  c spine tenderness-continue aspen--will check mri c spine to rule out ligamentous injury    Grade III spleen--repeat ct a/p shows no pseudoaneurysm    Left rib fx--pain control/ pulm toilet  DVT Proph: RAHUL/ david Apodaca MD, FACS  6/4/2019  7:03 PM

## 2019-06-04 NOTE — PROGRESS NOTES
Daily Trauma Progress Note 6/4/2019    Admit Date: 5/31/2019      Fall distance 10 feet    CC:  Follow up spleen injury    INJURIES:   Active Problems:    Hematoma of spleen, closed, initial encounter    Traumatic hemoperitoneum    Closed traumatic minimally displaced fracture of one rib of left side    Acute blood loss anemia    Laceration of spleen    Multiple closed fractures of ribs of left side  Resolved Problems:    * No resolved hospital problems. *    PREVIOUS 24 HOUR EVENTS: Afebrile  Vital signs stable. Transferred from SICU overnight. SUBJECTIVE:  He has left flank pain. He denies any abdominal pain. Tolerates diet. Continues to have point tenderness. OBJECTIVE:    Vitals:    06/03/19 1900 06/03/19 2000 06/03/19 2200 06/03/19 2345   BP: 117/84 121/72 136/74 (!) 141/88   Pulse: 71 79 79 74   Resp: 15 18 17 16   Temp:  97.6 °F (36.4 °C) 98.2 °F (36.8 °C) 98.1 °F (36.7 °C)   TempSrc:   Temporal Temporal   SpO2: 95% 94% 97% 97%   Weight:       Height:         No intake/output data recorded. PHYSICAL:  General appearance:  Comfortable. Pain Description: mild and moderate    NEUROLOGIC:   GCS:  15  4 - Opens eyes on own   6 - Follows simple motor commands  5 - Alert and oriented  Complains of cervical tenderness over C6 area. Cervical collar in place. Pupil size:  Left 3 mm  Right 3 mm  Pupil reaction: Yes  Wiggles fingers: Left Yes   Right   Yes    Hand grasp:   Left   Yes     Right   Yes  Wiggles toes: Left   Yes    Right   Yes  Plantar flexion:  Left   Yes  Right Yes  HEENT:  Eyes clear. PERRLA. Chest: Clear to ausculation bilaterally.   SMI  CV:  S1 S2.  RRR    Abdomen:  SNTND +BS  Extremities:  Atraumatic  Skin:  Warm & dry  Vascular:peripheral pulses symmetrical  Lines:  Peripheral    CONSULTS: none    PROCEDURES: none    ASSESSMENT/PLAN:  Active Problems:    Hematoma of spleen, closed, initial encounter    Traumatic hemoperitoneum    Closed traumatic minimally displaced

## 2019-06-04 NOTE — PROGRESS NOTES
Dr Gaston martin served that he is having lower abdominal pain radiating up. Had small bm and is going to MRI.

## 2019-06-05 ENCOUNTER — ANESTHESIA (OUTPATIENT)
Dept: OPERATING ROOM | Age: 49
DRG: 650 | End: 2019-06-05
Payer: COMMERCIAL

## 2019-06-05 ENCOUNTER — APPOINTMENT (OUTPATIENT)
Dept: CT IMAGING | Age: 49
DRG: 650 | End: 2019-06-05
Payer: COMMERCIAL

## 2019-06-05 ENCOUNTER — ANESTHESIA EVENT (OUTPATIENT)
Dept: OPERATING ROOM | Age: 49
DRG: 650 | End: 2019-06-05
Payer: COMMERCIAL

## 2019-06-05 VITALS — SYSTOLIC BLOOD PRESSURE: 109 MMHG | DIASTOLIC BLOOD PRESSURE: 76 MMHG | OXYGEN SATURATION: 86 %

## 2019-06-05 LAB
ABO/RH: NORMAL
ANION GAP SERPL CALCULATED.3IONS-SCNC: 10 MMOL/L (ref 7–16)
ANTIBODY SCREEN: NORMAL
BUN BLDV-MCNC: 16 MG/DL (ref 6–20)
CALCIUM SERPL-MCNC: 9.3 MG/DL (ref 8.6–10.2)
CHLORIDE BLD-SCNC: 103 MMOL/L (ref 98–107)
CO2: 25 MMOL/L (ref 22–29)
CREAT SERPL-MCNC: 0.9 MG/DL (ref 0.7–1.2)
GFR AFRICAN AMERICAN: >60
GFR NON-AFRICAN AMERICAN: >60 ML/MIN/1.73
GLUCOSE BLD-MCNC: 105 MG/DL (ref 74–99)
HCT VFR BLD CALC: 29.9 % (ref 37–54)
HCT VFR BLD CALC: 34.7 % (ref 37–54)
HEMOGLOBIN: 10 G/DL (ref 12.5–16.5)
HEMOGLOBIN: 11.3 G/DL (ref 12.5–16.5)
MCH RBC QN AUTO: 29.2 PG (ref 26–35)
MCH RBC QN AUTO: 29.8 PG (ref 26–35)
MCHC RBC AUTO-ENTMCNC: 32.6 % (ref 32–34.5)
MCHC RBC AUTO-ENTMCNC: 33.4 % (ref 32–34.5)
MCV RBC AUTO: 89 FL (ref 80–99.9)
MCV RBC AUTO: 89.7 FL (ref 80–99.9)
PDW BLD-RTO: 13.2 FL (ref 11.5–15)
PDW BLD-RTO: 13.3 FL (ref 11.5–15)
PLATELET # BLD: 206 E9/L (ref 130–450)
PLATELET # BLD: 260 E9/L (ref 130–450)
PMV BLD AUTO: 11 FL (ref 7–12)
PMV BLD AUTO: 11.1 FL (ref 7–12)
POTASSIUM REFLEX MAGNESIUM: 4.5 MMOL/L (ref 3.5–5)
RBC # BLD: 3.36 E12/L (ref 3.8–5.8)
RBC # BLD: 3.87 E12/L (ref 3.8–5.8)
SODIUM BLD-SCNC: 138 MMOL/L (ref 132–146)
WBC # BLD: 22.6 E9/L (ref 4.5–11.5)
WBC # BLD: 8.3 E9/L (ref 4.5–11.5)

## 2019-06-05 PROCEDURE — 1200000000 HC SEMI PRIVATE

## 2019-06-05 PROCEDURE — 6360000004 HC RX CONTRAST MEDICATION: Performed by: RADIOLOGY

## 2019-06-05 PROCEDURE — 36415 COLL VENOUS BLD VENIPUNCTURE: CPT

## 2019-06-05 PROCEDURE — 6360000002 HC RX W HCPCS: Performed by: STUDENT IN AN ORGANIZED HEALTH CARE EDUCATION/TRAINING PROGRAM

## 2019-06-05 PROCEDURE — 3600000004 HC SURGERY LEVEL 4 BASE: Performed by: SURGERY

## 2019-06-05 PROCEDURE — 71275 CT ANGIOGRAPHY CHEST: CPT

## 2019-06-05 PROCEDURE — 2500000003 HC RX 250 WO HCPCS: Performed by: NURSE ANESTHETIST, CERTIFIED REGISTERED

## 2019-06-05 PROCEDURE — 6360000002 HC RX W HCPCS: Performed by: NURSE PRACTITIONER

## 2019-06-05 PROCEDURE — 6370000000 HC RX 637 (ALT 250 FOR IP): Performed by: STUDENT IN AN ORGANIZED HEALTH CARE EDUCATION/TRAINING PROGRAM

## 2019-06-05 PROCEDURE — 3700000001 HC ADD 15 MINUTES (ANESTHESIA): Performed by: SURGERY

## 2019-06-05 PROCEDURE — 6360000002 HC RX W HCPCS: Performed by: NURSE ANESTHETIST, CERTIFIED REGISTERED

## 2019-06-05 PROCEDURE — 80048 BASIC METABOLIC PNL TOTAL CA: CPT

## 2019-06-05 PROCEDURE — 07TP0ZZ RESECTION OF SPLEEN, OPEN APPROACH: ICD-10-PCS | Performed by: SURGERY

## 2019-06-05 PROCEDURE — 3600000014 HC SURGERY LEVEL 4 ADDTL 15MIN: Performed by: SURGERY

## 2019-06-05 PROCEDURE — 2580000003 HC RX 258: Performed by: STUDENT IN AN ORGANIZED HEALTH CARE EDUCATION/TRAINING PROGRAM

## 2019-06-05 PROCEDURE — 0DQE4ZZ REPAIR LARGE INTESTINE, PERCUTANEOUS ENDOSCOPIC APPROACH: ICD-10-PCS | Performed by: SURGERY

## 2019-06-05 PROCEDURE — 86900 BLOOD TYPING SEROLOGIC ABO: CPT

## 2019-06-05 PROCEDURE — 85027 COMPLETE CBC AUTOMATED: CPT

## 2019-06-05 PROCEDURE — 97530 THERAPEUTIC ACTIVITIES: CPT

## 2019-06-05 PROCEDURE — 7100000001 HC PACU RECOVERY - ADDTL 15 MIN: Performed by: SURGERY

## 2019-06-05 PROCEDURE — 6360000002 HC RX W HCPCS: Performed by: ANESTHESIOLOGY

## 2019-06-05 PROCEDURE — 38100 REMOVAL OF SPLEEN TOTAL: CPT | Performed by: SURGERY

## 2019-06-05 PROCEDURE — 2709999900 HC NON-CHARGEABLE SUPPLY: Performed by: SURGERY

## 2019-06-05 PROCEDURE — 74177 CT ABD & PELVIS W/CONTRAST: CPT

## 2019-06-05 PROCEDURE — 86901 BLOOD TYPING SEROLOGIC RH(D): CPT

## 2019-06-05 PROCEDURE — 99232 SBSQ HOSP IP/OBS MODERATE 35: CPT | Performed by: NURSE PRACTITIONER

## 2019-06-05 PROCEDURE — 6360000002 HC RX W HCPCS: Performed by: SURGERY

## 2019-06-05 PROCEDURE — 86850 RBC ANTIBODY SCREEN: CPT

## 2019-06-05 PROCEDURE — 3700000000 HC ANESTHESIA ATTENDED CARE: Performed by: SURGERY

## 2019-06-05 PROCEDURE — 88305 TISSUE EXAM BY PATHOLOGIST: CPT

## 2019-06-05 PROCEDURE — 2580000003 HC RX 258: Performed by: NURSE ANESTHETIST, CERTIFIED REGISTERED

## 2019-06-05 PROCEDURE — 7100000000 HC PACU RECOVERY - FIRST 15 MIN: Performed by: SURGERY

## 2019-06-05 PROCEDURE — 99233 SBSQ HOSP IP/OBS HIGH 50: CPT | Performed by: SURGERY

## 2019-06-05 RX ORDER — SODIUM CHLORIDE 9 MG/ML
INJECTION, SOLUTION INTRAVENOUS CONTINUOUS PRN
Status: DISCONTINUED | OUTPATIENT
Start: 2019-06-05 | End: 2019-06-05 | Stop reason: SDUPTHER

## 2019-06-05 RX ORDER — LABETALOL HYDROCHLORIDE 5 MG/ML
5 INJECTION, SOLUTION INTRAVENOUS EVERY 10 MIN PRN
Status: DISCONTINUED | OUTPATIENT
Start: 2019-06-05 | End: 2019-06-05 | Stop reason: HOSPADM

## 2019-06-05 RX ORDER — DEXAMETHASONE SODIUM PHOSPHATE 10 MG/ML
INJECTION INTRAMUSCULAR; INTRAVENOUS PRN
Status: DISCONTINUED | OUTPATIENT
Start: 2019-06-05 | End: 2019-06-05 | Stop reason: SDUPTHER

## 2019-06-05 RX ORDER — LIDOCAINE 4 G/G
1 PATCH TOPICAL DAILY
Qty: 1 PATCH | Refills: 0 | Status: SHIPPED | OUTPATIENT
Start: 2019-06-06 | End: 2019-06-13

## 2019-06-05 RX ORDER — NEOSTIGMINE METHYLSULFATE 1 MG/ML
INJECTION, SOLUTION INTRAVENOUS PRN
Status: DISCONTINUED | OUTPATIENT
Start: 2019-06-05 | End: 2019-06-05 | Stop reason: SDUPTHER

## 2019-06-05 RX ORDER — FENTANYL CITRATE 50 UG/ML
INJECTION, SOLUTION INTRAMUSCULAR; INTRAVENOUS PRN
Status: DISCONTINUED | OUTPATIENT
Start: 2019-06-05 | End: 2019-06-05 | Stop reason: SDUPTHER

## 2019-06-05 RX ORDER — PROMETHAZINE HYDROCHLORIDE 25 MG/ML
12.5 INJECTION, SOLUTION INTRAMUSCULAR; INTRAVENOUS PRN
Status: DISCONTINUED | OUTPATIENT
Start: 2019-06-05 | End: 2019-06-05 | Stop reason: HOSPADM

## 2019-06-05 RX ORDER — PROMETHAZINE HYDROCHLORIDE 25 MG/ML
25 INJECTION, SOLUTION INTRAMUSCULAR; INTRAVENOUS PRN
Status: DISCONTINUED | OUTPATIENT
Start: 2019-06-05 | End: 2019-06-05

## 2019-06-05 RX ORDER — SODIUM CHLORIDE, SODIUM LACTATE, POTASSIUM CHLORIDE, CALCIUM CHLORIDE 600; 310; 30; 20 MG/100ML; MG/100ML; MG/100ML; MG/100ML
INJECTION, SOLUTION INTRAVENOUS CONTINUOUS
Status: DISCONTINUED | OUTPATIENT
Start: 2019-06-05 | End: 2019-06-06

## 2019-06-05 RX ORDER — MEPERIDINE HYDROCHLORIDE 50 MG/ML
12.5 INJECTION INTRAMUSCULAR; INTRAVENOUS; SUBCUTANEOUS EVERY 5 MIN PRN
Status: DISCONTINUED | OUTPATIENT
Start: 2019-06-05 | End: 2019-06-05 | Stop reason: HOSPADM

## 2019-06-05 RX ORDER — ROCURONIUM BROMIDE 10 MG/ML
INJECTION, SOLUTION INTRAVENOUS PRN
Status: DISCONTINUED | OUTPATIENT
Start: 2019-06-05 | End: 2019-06-05 | Stop reason: SDUPTHER

## 2019-06-05 RX ORDER — NICOTINE 21 MG/24HR
1 PATCH, TRANSDERMAL 24 HOURS TRANSDERMAL DAILY
Qty: 7 PATCH | Refills: 0 | Status: SHIPPED | OUTPATIENT
Start: 2019-06-06 | End: 2019-06-18 | Stop reason: ALTCHOICE

## 2019-06-05 RX ORDER — METHOCARBAMOL 750 MG/1
750 TABLET, FILM COATED ORAL 4 TIMES DAILY
Qty: 40 TABLET | Refills: 0 | Status: SHIPPED | OUTPATIENT
Start: 2019-06-05 | End: 2019-06-15

## 2019-06-05 RX ORDER — SUCCINYLCHOLINE/SOD CL,ISO/PF 200MG/10ML
SYRINGE (ML) INTRAVENOUS PRN
Status: DISCONTINUED | OUTPATIENT
Start: 2019-06-05 | End: 2019-06-05 | Stop reason: SDUPTHER

## 2019-06-05 RX ORDER — ONDANSETRON 2 MG/ML
INJECTION INTRAMUSCULAR; INTRAVENOUS PRN
Status: DISCONTINUED | OUTPATIENT
Start: 2019-06-05 | End: 2019-06-05 | Stop reason: SDUPTHER

## 2019-06-05 RX ORDER — PROPOFOL 10 MG/ML
INJECTION, EMULSION INTRAVENOUS PRN
Status: DISCONTINUED | OUTPATIENT
Start: 2019-06-05 | End: 2019-06-05 | Stop reason: SDUPTHER

## 2019-06-05 RX ORDER — ONDANSETRON 2 MG/ML
4 INJECTION INTRAMUSCULAR; INTRAVENOUS ONCE
Status: COMPLETED | OUTPATIENT
Start: 2019-06-05 | End: 2019-06-05

## 2019-06-05 RX ORDER — BISACODYL 10 MG
10 SUPPOSITORY, RECTAL RECTAL DAILY
Status: DISCONTINUED | OUTPATIENT
Start: 2019-06-05 | End: 2019-06-14 | Stop reason: HOSPADM

## 2019-06-05 RX ORDER — LIDOCAINE HYDROCHLORIDE 20 MG/ML
INJECTION, SOLUTION INTRAVENOUS PRN
Status: DISCONTINUED | OUTPATIENT
Start: 2019-06-05 | End: 2019-06-05 | Stop reason: SDUPTHER

## 2019-06-05 RX ORDER — OXYCODONE HYDROCHLORIDE 5 MG/1
5 TABLET ORAL EVERY 8 HOURS PRN
Qty: 15 TABLET | Refills: 0 | Status: SHIPPED | OUTPATIENT
Start: 2019-06-05 | End: 2019-06-10

## 2019-06-05 RX ORDER — CEFAZOLIN SODIUM 1 G/3ML
INJECTION, POWDER, FOR SOLUTION INTRAMUSCULAR; INTRAVENOUS PRN
Status: DISCONTINUED | OUTPATIENT
Start: 2019-06-05 | End: 2019-06-05 | Stop reason: SDUPTHER

## 2019-06-05 RX ORDER — SODIUM CHLORIDE 0.9 % (FLUSH) 0.9 %
10 SYRINGE (ML) INJECTION ONCE
Status: DISCONTINUED | OUTPATIENT
Start: 2019-06-05 | End: 2019-06-10

## 2019-06-05 RX ORDER — GLYCOPYRROLATE 1 MG/5 ML
SYRINGE (ML) INTRAVENOUS PRN
Status: DISCONTINUED | OUTPATIENT
Start: 2019-06-05 | End: 2019-06-05 | Stop reason: SDUPTHER

## 2019-06-05 RX ADMIN — SODIUM CHLORIDE: 9 INJECTION, SOLUTION INTRAVENOUS at 15:01

## 2019-06-05 RX ADMIN — ACETAMINOPHEN 650 MG: 325 TABLET, FILM COATED ORAL at 05:44

## 2019-06-05 RX ADMIN — Medication 140 MG: at 15:06

## 2019-06-05 RX ADMIN — FENTANYL CITRATE 50 MCG: 50 INJECTION, SOLUTION INTRAMUSCULAR; INTRAVENOUS at 16:29

## 2019-06-05 RX ADMIN — PHENYLEPHRINE HYDROCHLORIDE 100 MCG: 10 INJECTION INTRAVENOUS at 15:19

## 2019-06-05 RX ADMIN — DEXAMETHASONE SODIUM PHOSPHATE 10 MG: 10 INJECTION INTRAMUSCULAR; INTRAVENOUS at 15:14

## 2019-06-05 RX ADMIN — HYDROMORPHONE HYDROCHLORIDE 0.5 MG: 1 INJECTION, SOLUTION INTRAMUSCULAR; INTRAVENOUS; SUBCUTANEOUS at 17:07

## 2019-06-05 RX ADMIN — OXYCODONE HYDROCHLORIDE 10 MG: 10 TABLET ORAL at 05:51

## 2019-06-05 RX ADMIN — DOCUSATE SODIUM 100 MG: 100 CAPSULE, LIQUID FILLED ORAL at 09:03

## 2019-06-05 RX ADMIN — SODIUM CHLORIDE: 9 INJECTION, SOLUTION INTRAVENOUS at 15:47

## 2019-06-05 RX ADMIN — FENTANYL CITRATE 100 MCG: 50 INJECTION, SOLUTION INTRAMUSCULAR; INTRAVENOUS at 15:06

## 2019-06-05 RX ADMIN — HYDROMORPHONE HYDROCHLORIDE 0.5 MG: 1 INJECTION, SOLUTION INTRAMUSCULAR; INTRAVENOUS; SUBCUTANEOUS at 16:42

## 2019-06-05 RX ADMIN — PHENYLEPHRINE HYDROCHLORIDE 100 MCG: 10 INJECTION INTRAVENOUS at 15:23

## 2019-06-05 RX ADMIN — SODIUM CHLORIDE: 9 INJECTION, SOLUTION INTRAVENOUS at 15:46

## 2019-06-05 RX ADMIN — Medication 10 ML: at 10:31

## 2019-06-05 RX ADMIN — HYDROMORPHONE HYDROCHLORIDE 0.5 MG: 1 INJECTION, SOLUTION INTRAMUSCULAR; INTRAVENOUS; SUBCUTANEOUS at 14:15

## 2019-06-05 RX ADMIN — HYDROMORPHONE HYDROCHLORIDE 0.5 MG: 1 INJECTION, SOLUTION INTRAMUSCULAR; INTRAVENOUS; SUBCUTANEOUS at 16:57

## 2019-06-05 RX ADMIN — Medication 10 ML: at 14:44

## 2019-06-05 RX ADMIN — ROCURONIUM BROMIDE 5 MG: 10 INJECTION, SOLUTION INTRAVENOUS at 15:06

## 2019-06-05 RX ADMIN — ONDANSETRON HYDROCHLORIDE 4 MG: 2 SOLUTION INTRAMUSCULAR; INTRAVENOUS at 19:59

## 2019-06-05 RX ADMIN — METHOCARBAMOL TABLETS 1500 MG: 750 TABLET, COATED ORAL at 09:02

## 2019-06-05 RX ADMIN — HYDROMORPHONE HYDROCHLORIDE 0.5 MG: 1 INJECTION, SOLUTION INTRAMUSCULAR; INTRAVENOUS; SUBCUTANEOUS at 23:13

## 2019-06-05 RX ADMIN — CEFAZOLIN 2000 MG: 1 INJECTION, POWDER, FOR SOLUTION INTRAMUSCULAR; INTRAVENOUS at 15:45

## 2019-06-05 RX ADMIN — OXYCODONE HYDROCHLORIDE 10 MG: 10 TABLET ORAL at 21:16

## 2019-06-05 RX ADMIN — PROPOFOL 170 MG: 10 INJECTION, EMULSION INTRAVENOUS at 15:06

## 2019-06-05 RX ADMIN — ACETAMINOPHEN 650 MG: 325 TABLET, FILM COATED ORAL at 10:38

## 2019-06-05 RX ADMIN — IOPAMIDOL 110 ML: 755 INJECTION, SOLUTION INTRAVENOUS at 14:44

## 2019-06-05 RX ADMIN — SODIUM CHLORIDE, POTASSIUM CHLORIDE, SODIUM LACTATE AND CALCIUM CHLORIDE: 600; 310; 30; 20 INJECTION, SOLUTION INTRAVENOUS at 19:54

## 2019-06-05 RX ADMIN — PROMETHAZINE HYDROCHLORIDE 12.5 MG: 25 INJECTION INTRAMUSCULAR; INTRAVENOUS at 17:44

## 2019-06-05 RX ADMIN — FENTANYL CITRATE 50 MCG: 50 INJECTION, SOLUTION INTRAMUSCULAR; INTRAVENOUS at 16:26

## 2019-06-05 RX ADMIN — Medication: at 09:06

## 2019-06-05 RX ADMIN — Medication 3 MG: at 16:13

## 2019-06-05 RX ADMIN — HYDROMORPHONE HYDROCHLORIDE 0.5 MG: 1 INJECTION, SOLUTION INTRAMUSCULAR; INTRAVENOUS; SUBCUTANEOUS at 10:30

## 2019-06-05 RX ADMIN — ENOXAPARIN SODIUM 30 MG: 30 INJECTION SUBCUTANEOUS at 09:02

## 2019-06-05 RX ADMIN — ONDANSETRON HYDROCHLORIDE 4 MG: 2 INJECTION, SOLUTION INTRAMUSCULAR; INTRAVENOUS at 15:09

## 2019-06-05 RX ADMIN — HYDROMORPHONE HYDROCHLORIDE 0.5 MG: 1 INJECTION, SOLUTION INTRAMUSCULAR; INTRAVENOUS; SUBCUTANEOUS at 16:49

## 2019-06-05 RX ADMIN — FENTANYL CITRATE 50 MCG: 50 INJECTION, SOLUTION INTRAMUSCULAR; INTRAVENOUS at 16:23

## 2019-06-05 RX ADMIN — ONDANSETRON HYDROCHLORIDE 4 MG: 2 SOLUTION INTRAMUSCULAR; INTRAVENOUS at 11:43

## 2019-06-05 RX ADMIN — ROCURONIUM BROMIDE 20 MG: 10 INJECTION, SOLUTION INTRAVENOUS at 15:35

## 2019-06-05 RX ADMIN — OXYCODONE HYDROCHLORIDE 10 MG: 10 TABLET ORAL at 11:36

## 2019-06-05 RX ADMIN — Medication 10 ML: at 09:02

## 2019-06-05 RX ADMIN — LIDOCAINE HYDROCHLORIDE 80 MG: 20 INJECTION, SOLUTION INTRAVENOUS at 15:06

## 2019-06-05 RX ADMIN — PHENYLEPHRINE HYDROCHLORIDE 100 MCG: 10 INJECTION INTRAVENOUS at 15:17

## 2019-06-05 RX ADMIN — ONDANSETRON HYDROCHLORIDE 4 MG: 2 INJECTION, SOLUTION INTRAMUSCULAR; INTRAVENOUS at 16:04

## 2019-06-05 RX ADMIN — PHENYLEPHRINE HYDROCHLORIDE 100 MCG: 10 INJECTION INTRAVENOUS at 15:34

## 2019-06-05 RX ADMIN — Medication 0.6 MG: at 16:13

## 2019-06-05 RX ADMIN — HYDROMORPHONE HYDROCHLORIDE 0.5 MG: 1 INJECTION, SOLUTION INTRAMUSCULAR; INTRAVENOUS; SUBCUTANEOUS at 20:00

## 2019-06-05 RX ADMIN — ROCURONIUM BROMIDE 25 MG: 10 INJECTION, SOLUTION INTRAVENOUS at 15:19

## 2019-06-05 RX ADMIN — MAGNESIUM HYDROXIDE 30 ML: 400 SUSPENSION ORAL at 09:02

## 2019-06-05 ASSESSMENT — PAIN DESCRIPTION - PAIN TYPE
TYPE: SURGICAL PAIN
TYPE: ACUTE PAIN
TYPE: SURGICAL PAIN
TYPE: ACUTE PAIN
TYPE: SURGICAL PAIN
TYPE: ACUTE PAIN
TYPE: SURGICAL PAIN

## 2019-06-05 ASSESSMENT — PAIN - FUNCTIONAL ASSESSMENT
PAIN_FUNCTIONAL_ASSESSMENT: PREVENTS OR INTERFERES SOME ACTIVE ACTIVITIES AND ADLS

## 2019-06-05 ASSESSMENT — PAIN DESCRIPTION - FREQUENCY
FREQUENCY: CONTINUOUS
FREQUENCY: INTERMITTENT
FREQUENCY: CONTINUOUS

## 2019-06-05 ASSESSMENT — PAIN DESCRIPTION - ONSET
ONSET: GRADUAL
ONSET: ON-GOING

## 2019-06-05 ASSESSMENT — PAIN DESCRIPTION - LOCATION
LOCATION: ABDOMEN
LOCATION: RIB CAGE
LOCATION: RIB CAGE
LOCATION: ABDOMEN
LOCATION: RIB CAGE
LOCATION: ABDOMEN

## 2019-06-05 ASSESSMENT — PAIN DESCRIPTION - PROGRESSION
CLINICAL_PROGRESSION: GRADUALLY WORSENING
CLINICAL_PROGRESSION: GRADUALLY IMPROVING
CLINICAL_PROGRESSION: GRADUALLY WORSENING
CLINICAL_PROGRESSION: NOT CHANGED
CLINICAL_PROGRESSION: NOT CHANGED
CLINICAL_PROGRESSION: GRADUALLY WORSENING
CLINICAL_PROGRESSION: GRADUALLY WORSENING
CLINICAL_PROGRESSION: NOT CHANGED

## 2019-06-05 ASSESSMENT — PAIN DESCRIPTION - DESCRIPTORS
DESCRIPTORS: DISCOMFORT
DESCRIPTORS: ACHING;CONSTANT;DISCOMFORT
DESCRIPTORS: DISCOMFORT
DESCRIPTORS: ACHING;CONSTANT;DISCOMFORT
DESCRIPTORS: ACHING;DISCOMFORT;SORE

## 2019-06-05 ASSESSMENT — PAIN SCALES - GENERAL
PAINLEVEL_OUTOF10: 3
PAINLEVEL_OUTOF10: 10
PAINLEVEL_OUTOF10: 10
PAINLEVEL_OUTOF10: 0
PAINLEVEL_OUTOF10: 0
PAINLEVEL_OUTOF10: 10
PAINLEVEL_OUTOF10: 0
PAINLEVEL_OUTOF10: 0
PAINLEVEL_OUTOF10: 7
PAINLEVEL_OUTOF10: 10
PAINLEVEL_OUTOF10: 8
PAINLEVEL_OUTOF10: 10
PAINLEVEL_OUTOF10: 0
PAINLEVEL_OUTOF10: 10
PAINLEVEL_OUTOF10: 9
PAINLEVEL_OUTOF10: 10
PAINLEVEL_OUTOF10: 10
PAINLEVEL_OUTOF10: 9
PAINLEVEL_OUTOF10: 0

## 2019-06-05 ASSESSMENT — PAIN DESCRIPTION - ORIENTATION
ORIENTATION: LEFT
ORIENTATION: MID
ORIENTATION: LEFT
ORIENTATION: LEFT
ORIENTATION: MID

## 2019-06-05 ASSESSMENT — LIFESTYLE VARIABLES: SMOKING_STATUS: 1

## 2019-06-05 NOTE — PROGRESS NOTES
Floor Called, nurse to nurse given. Spoke with [employer] . Patients test results review, VS reported to receiving nurse. Any and all important information regarding patient disclosed.

## 2019-06-05 NOTE — PROGRESS NOTES
[facility]  ATTENDING PHYSICIAN PROGRESS NOTE     I have examined the patient and  reviewed the record. I have reviewed all relevant labs and imaging data. The following summarizes my clinical findings and independent assessment. CC: grade III splenic injury      S. Pt has persistent neck pain. Underwent MRI that shows C6TP fx and ligamentous injury    O.  Vitals:    06/05/19 0845   BP: (!) 145/79   Pulse: 76   Resp: 16   Temp: 97.8 °F (36.6 °C)   SpO2: 94%     NAD  Neck--aspen collar present--persistent c spine tenderness  Abdomen soft nd mild LUQ tenderness  Left chest wall tender    ASSESSMENT:  Active Problems:    Hematoma of spleen, closed, initial encounter    Traumatic hemoperitoneum    Closed traumatic minimally displaced fracture of one rib of left side    Acute blood loss anemia    Laceration of spleen    Multiple closed fractures of ribs of left side  Resolved Problems:    * No resolved hospital problems.  *       PLAN:  MRI shows C6 fx and ligamentous injury  Custom collar ordered  Collar x 3 months  Dr Roddy Black consulted    Grade III spleen--repeat ct a/p shows no pseudoaneurysm    Left rib fx--pain control/ pulm toilet  DVT Proph: SCDS/ lovenox     DC planning    Dino Fernandez MD, FACS  6/5/2019  12:39 PM

## 2019-06-05 NOTE — PROGRESS NOTES
Called by Martha Meyer because pt became diaphoretic. He has increased abdominal pain and shortness of breath. DDx includes splenic rupture vs PE. Exam:  Pale  Diaphoretic  Abdomen soft diffusely tender    A/p  Will order stat CT scan of chest to rule out PE with hx of rib fx  Will order stat CT a/p to rule out delayed splenic rupture. Had repeat CT a/p on 6/3 that was stable. Kelly Kay MD    Addendum  Reviewed CT a/p--it shows contrast extravasation with active blush. Large hematoma. Pt is bleeding from his spleen and needs a stat splenectomy.  He has failed non operative management  Discussed with wife and she is in agreement   he is actively bleeding and needs his spleen removed in order to stop hemorrage    Electronically signed by Kelly Kay MD on 6/5/2019 at 3:11 PM

## 2019-06-05 NOTE — OP NOTE
Yanni Layne  [id number]      DATE OF PROCEDURE: 6/5/2019    SURGEON: Dr. Sony Hare MD    ASSISTANT: Edie Runner - PGYIII    PREOPERATIVE DIAGNOSIS: Grade IV splenic laceration with active extravastation    POSTOPERATIVE DIAGNOSIS: Same    OPERATION:   1. Exploratory laparotomy  2. Splenectomy   3. Primary repair of colon serosal injury    ANESTHESIA: General    ESTIMATED BLOOD LOSS: 504 ml    COMPLICATIONS: None    SPECIMENS: Spleen      HISTORY: Yanni Layne is a 50 y.o. male with a grade IV splenic laceration with active contrast extravastation. Patient sustained a fall from 10 ft on 5/31. Found to have a Grade III splenic laceration and rib fractures. Admitted to the SICU for further monitoring. Transferred post-injury day #2. Progressed appropriately with a stable Hgb until this afternoon when he became diaphoretic with diffuse abdominal pain. CT of the abdomen w/ contrast was obtained and demonstrated a grade IV splenic laceration with active contrast extravasation. Exploratory laparotomy with splenectomy was recommended. The risks benefits and alternatives of the procedure were discussed with the patient who stated understanding and agreed to proceed. DESCRIPTION OF PROCEDURE: The patient was brought to the operating room and positioned supine on the OR table. Sequential compression devices were placed on the patient's lower extremities and functioning. Preoperative antibiotics were administered. Anesthesia was obtained without complication as per the anesthesia record. Immediately prior to the procedure a time-out was called and the surgical checklist was reviewed and agreed upon by all present. The patient was prepped and draped in the usual sterile fashion. A midline abdominal incision was made with a #10 blade through skin and subcutaneous tissue down to fascia. Fascia was entered with electrocautery and extended cephalad and caudal exposing preperitoneal fat.  Two straight clamps were attached to the peritoneum. Rudolph scissors were used to make a cut within the peritoneum. A finger was entered into the peritoneal cavity and check was adhesions, which their were none. Next, the incision through the peritoneum was extended with electrocautery. At this point, a moderate amount of dark blood and clot was encountered. All four quadrants were packed with sponges and removed in a sequential order. Next, the spleen was adalberto pulled from the left upper quadrant into the midline incision. The capsule of the spleen was ruptured with evidence of multiple fractures and a large laceration extending from both superior and inferior poles. Next, the splenic hilum was exposed and hilar vessels clamped with blunt Nancy clamps. Once vascular control of the spleen was obtained, the hilum was transected with Rudolph scissors, removed and sent to pathology. Vascular sutures were then stick tied with 2-0 Vicryl sutures and clamps removed. No active hemorrhage was noted. Next, the small bowel was inspected from the Ligament of Treitz to the terminal ileum. The colon was inspected. There was a serosal injury in the mid-transverse colon that was primarily repaired with 2-0 Vicryl suture. Liver and stomach appeared normal. All four quadrants were then irrigated with saline. Hemostasis was checked, which was adequate. At this point, the fascias was closed in a continuous running fashion using an 0-PDS suture. Skin was approximated with staples. Needle, sponge, and instrument counts were reported as correct x2. Dr. Suzanne Bower was present and scrubbed throughout the case. The patient tolerated the procedure well without complications. He was transferred to the recovery area in good condition. Electronically signed by Jerad Latham MD on 6/5/19 at 4545 [facility]   Attending Physician Statement:  I was present during the entire procedure on 6/5/2019 and was actively supervising and directing the resident.  There were no complications.     Kiana Kim MD, FACS  6/6/2019  6:00 PM

## 2019-06-05 NOTE — CONSULTS
[address]                  [address] [facility],  [address]                                  CONSULTATION    PATIENT NAME: Lucho Calle                     :        1970  MED REC NO:   [id number]                            ROOM:       [id number]  ACCOUNT NO:   [de-identified]                           ADMIT DATE: 2019  PROVIDER:     Barbara Thornton MD    CONSULT DATE:  2019    HISTORY OF PRESENT ILLNESS:  This is a 45-year-old male who fell off a  ladder and was brought to the hospital for trauma evaluation. He did  develop some neck pain. An MRI  of the neck showed some interspinous  and supraspinous increased signals consistent with posterior ligamentous  injury as well as the tip of C6 spinous process fracture. He was in an  Marne collar. His admitting H and P was reviewed. I counseled him and  his family extensively and answered all of their questions. PHYSICAL EXAMINATION:  GENERAL:  He is awake, he is alert, he is oriented, friendly,  cooperative. HEENT:  Pupils equal, round, and reactive. Extraocular movements are  full. NEUROLOGIC:  Motor and sensory exam are completely intact. Aspen collar  in place. DIAGNOSIS:  Ligamentous injury. Spinous process fracture. PLAN:  Recommend custom fit cervical collar for six to eight weeks and  followup with me in the office once discharged.         Eddi Linder MD    D: 2019 11:26:25       T: 2019 12:21:13     [email or web address]  Job#: [id number]     Doc#: [id number]    CC:

## 2019-06-05 NOTE — PROGRESS NOTES
Patient c/o intense pain in the L flank area, up to shoulder. Pain meds not helping. Patient becoming increasingly agitated, with shortness of breath and nausea, diaphoretic and pale. Trauma notified.

## 2019-06-05 NOTE — ANESTHESIA PROCEDURE NOTES
Arterial Line:    An arterial line was placed using ultrasound guidance, in the OR for the following indication(s): continuous blood pressure monitoring and blood sampling needed. A 20 gauge (size), 1 and 3/4 inch (length), Arrow (type) catheter was placed, Seldinger technique used, into the left radial artery, secured by tape and Tegaderm. Anesthesia type: General    Events:  patient tolerated procedure well with no complications. 6/5/2019 3:10 PM6/5/2019 3:15 PM  Anesthesiologist: Renee Brian MD  Resident/CRNA: EARL Foreman - ANA  Other anesthesia staff: Norma Fisher RN  Performed:  Other anesthesia staff   Preanesthetic Checklist  Completed: patient identified, site marked, surgical consent, pre-op evaluation, timeout performed, IV checked, risks and benefits discussed, monitors and equipment checked, anesthesia consent given, oxygen available and patient being monitored

## 2019-06-05 NOTE — ANESTHESIA PRE PROCEDURE
Department of Anesthesiology  Preprocedure Note       Name:  Ivy Zeng   Age:  50 y.o.  :  1970                                          MRN:  [id number]         Date:  2019      Surgeon: Phill Platt):  Deyvi Guadarrama MD    Procedure: LAPAROTOMY EXPLORATORY,SPLEENECTOMY (N/A )    Medications prior to admission:   Prior to Admission medications    Medication Sig Start Date End Date Taking? Authorizing Provider   oxyCODONE (ROXICODONE) 5 MG immediate release tablet Take 1 tablet by mouth every 8 hours as needed for Pain for up to 5 days. 6/5/19 6/10/19 Yes Beatrice Patrizia, APRN - CNS   lidocaine 4 % external patch Place 1 patch onto the skin daily for 7 days 19 Yes Beatrice Patrizia, APRN - CNS   nicotine (NICODERM CQ) 21 MG/24HR Place 1 patch onto the skin daily for 7 days 19 Yes Beatrice Patrizia, APRN - CNS   methocarbamol (ROBAXIN) 750 MG tablet Take 1 tablet by mouth 4 times daily for 10 days 6/5/19 6/15/19 Yes Beatrice Patrizia, APRN - CNS   nitroGLYCERIN (NITROSTAT) 0.4 MG SL tablet up to max of 3 total doses. If no relief after 3 doses, call 911. 19   Kendall Noland, DO   atorvastatin (LIPITOR) 40 MG tablet Take 1 tablet by mouth nightly 19   Kendall Mooneyk, DO   benzocaine (ORAJEL) 10 % mucosal gel Take by mouth as needed.  18   Blane Avelar PA-C       Current medications:    Current Facility-Administered Medications   Medication Dose Route Frequency Provider Last Rate Last Dose    bisacodyl (DULCOLAX) suppository 10 mg  10 mg Rectal Daily Manoj Woods MD        HYDROmorphone (DILAUDID) injection 0.5 mg  0.5 mg Intravenous Once Beatrice Acharya APRN - CNS        ondansetron [facility]) injection 4 mg  4 mg Intravenous Once Dyevi Guadarrama MD        sodium chloride flush 0.9 % injection 10 mL  10 mL Intravenous Once Marquis Matt MD        lidocaine 4 % external patch 1 patch  1 patch Transdermal Daily Marianne Hughes MD   1 patch at 19 3091    methocarbamol (ROBAXIN) tablet 1,500 mg  1,500 mg Oral 4x Daily Radha Henson MD   1,500 mg at 06/05/19 0902    enoxaparin (LOVENOX) injection 30 mg  30 mg Subcutaneous BID Radha Henson MD   30 mg at 06/05/19 0902    docusate sodium (COLACE) capsule 100 mg  100 mg Oral BID Radha Henson MD   100 mg at 06/05/19 7341    senna (SENOKOT) tablet 8.6 mg  1 tablet Oral Nightly Radha Henson MD   8.6 mg at 06/04/19 2113    sodium chloride flush 0.9 % injection 10 mL  10 mL Intravenous 2 times per day Radha Henson MD   10 mL at 06/05/19 0902    sodium chloride flush 0.9 % injection 10 mL  10 mL Intravenous PRN Radha Henson MD   10 mL at 06/05/19 1444    bacitracin zinc ointment   Topical TID Radha Henson MD        acetaminophen (TYLENOL) tablet 650 mg  650 mg Oral Q4H Radha Henson MD   650 mg at 06/05/19 1038    magnesium hydroxide (MILK OF MAGNESIA) 400 MG/5ML suspension 30 mL  30 mL Oral Daily PRN Radha Henson MD   30 mL at 06/05/19 0902    ondansetron (ZOFRAN) injection 4 mg  4 mg Intravenous Q6H PRN Radha Henson MD   4 mg at 06/05/19 1143    oxyCODONE (ROXICODONE) immediate release tablet 5 mg  5 mg Oral Q4H PRN Radha Henson MD   5 mg at 06/04/19 0117    Or    oxyCODONE HCl (OXY-IR) immediate release tablet 10 mg  10 mg Oral Q4H PRN Radha Henson MD   10 mg at 06/05/19 1136    atorvastatin (LIPITOR) tablet 40 mg  40 mg Oral Nightly Radha Henson MD   40 mg at 06/04/19 2113    nicotine (NICODERM CQ) 21 MG/24HR 1 patch  1 patch Transdermal Daily Radha Henson MD   1 patch at 06/05/19 0902       Allergies:  No Known Allergies    Problem List:    Patient Active Problem List   Diagnosis Code    Chest pain R07.9    Tobacco use Z72.0    Atypical chest pain R07.89    Cocaine use F14.90    Nodule of left lung R91.1    Numbness and tingling of right lower extremity R20.0, R20.2    Unstable angina (HCC) I20.0    Leukocytosis D72.829    Hep C w/o coma, chronic (HCC) B18.2    Hematoma of spleen, closed, initial encounter S36.029A    Traumatic hemoperitoneum S36.899A    Closed traumatic minimally displaced fracture of one rib of left side S22.32XA    Acute blood loss anemia D62    Laceration of spleen S36.039A    Multiple closed fractures of ribs of left side S22.42XA       Past Medical History:        Diagnosis Date    GERD (gastroesophageal reflux disease)        Past Surgical History:  History reviewed. No pertinent surgical history. Social History:    Social History     Tobacco Use    Smoking status: Current Every Day Smoker     Packs/day: 1.00     Years: 30.00     Pack years: 30.00    Smokeless tobacco: Never Used   Substance Use Topics    Alcohol use: Yes     Comment: Occasionally                                Ready to quit: Not Answered  Counseling given: Not Answered      Vital Signs (Current):   Vitals:    06/04/19 0730 06/04/19 1500 06/05/19 0000 06/05/19 0845   BP: (!) 121/58 134/63 131/73 (!) 145/79   Pulse: 70 92 73 76   Resp: 16 16 16 16   Temp: 97.6 °F (36.4 °C) 97.1 °F (36.2 °C) 97.9 °F (36.6 °C) 97.8 °F (36.6 °C)   TempSrc: Temporal Temporal Temporal Temporal   SpO2: 96% 95% 96% 94%   Weight:       Height:                                                  BP Readings from Last 3 Encounters:   06/05/19 (!) 145/79   01/05/19 (!) 98/58   10/02/18 108/60       NPO Status:                                                                                 BMI:   Wt Readings from Last 3 Encounters:   06/03/19 185 lb (83.9 kg)   01/05/19 178 lb (80.7 kg)   09/30/18 180 lb (81.6 kg)     Body mass index is 25.09 kg/m².     CBC:   Lab Results   Component Value Date    WBC 8.3 06/05/2019    RBC 3.87 06/05/2019    HGB 11.3 06/05/2019    HCT 34.7 06/05/2019    MCV 89.7 06/05/2019    RDW 13.2 06/05/2019     06/05/2019       CMP:   Lab Results   Component Value Date     06/05/2019    K 4.5 06/05/2019     06/05/2019    CO2 25 06/05/2019    BUN 16 06/05/2019    CREATININE 0.9 06/05/2019    GFRAA >60 06/05/2019    LABGLOM >60 06/05/2019    GLUCOSE 105 06/05/2019    PROT 5.2 06/03/2019    CALCIUM 9.3 06/05/2019    BILITOT <0.2 06/03/2019    ALKPHOS 49 06/03/2019    AST 14 06/03/2019    ALT 7 06/03/2019       POC Tests: No results for input(s): POCGLU, POCNA, POCK, POCCL, POCBUN, POCHEMO, POCHCT in the last 72 hours. Coags:   Lab Results   Component Value Date    PROTIME 13.0 07/17/2018    INR 1.1 07/17/2018    APTT 33.2 03/25/2017       HCG (If Applicable): No results found for: PREGTESTUR, PREGSERUM, HCG, HCGQUANT     ABGs: No results found for: PHART, PO2ART, ZZU5JPJ, UIN4VFC, BEART, I7BCPOQK     Type & Screen (If Applicable):  No results found for: [facility]    Anesthesia Evaluation  Patient summary reviewed no history of anesthetic complications:   Airway: Mallampati: II  TM distance: >3 FB   Neck ROM: full  Mouth opening: > = 3 FB Dental: normal exam         Pulmonary: breath sounds clear to auscultation  (+) current smoker                          ROS comment: Nodule of left lung   Cardiovascular:    (+) angina:,         Rhythm: regular  Rate: normal                    Neuro/Psych:                ROS comment: Numbness and tingling of right lower extremity GI/Hepatic/Renal:   (+) GERD:, hepatitis: C,          ROS comment: Ruptured Spleen  S/P fall from ladder 5/31/19. Endo/Other:    (+) blood dyscrasia (HGB 11.3): anemia:., .                  ROS comment: Cocaine use Abdominal:           Vascular: negative vascular ROS. Anesthesia Plan      general     ASA 4 - emergent       Induction: intravenous. MIPS: Postoperative opioids intended and Prophylactic antiemetics administered. Anesthetic plan and risks discussed with patient. Use of blood products discussed with patient whom consented to blood products.                    Jim Torres MD   6/5/2019

## 2019-06-05 NOTE — PROGRESS NOTES
Daily Trauma Progress Note 6/5/2019    Admit Date: 5/31/2019      Fall distance 10 feet    CC:  Follow up spleen injury    INJURIES:   Active Problems:    Hematoma of spleen, closed, initial encounter    Traumatic hemoperitoneum    Closed traumatic minimally displaced fracture of one rib of left side    Acute blood loss anemia    Laceration of spleen    Multiple closed fractures of ribs of left side  Resolved Problems:    * No resolved hospital problems. *    PREVIOUS 24 HOUR EVENTS: Afebrile  Vital signs stable. Cervical spine MRI showed ligament injury. Neurosurgery consulted. SUBJECTIVE:  He continues with left flank pain. He denies any abdominal pain. Tolerates diet. Continues to have point tenderness along cervical spine. OBJECTIVE:    Vitals:    06/04/19 0730 06/04/19 1500 06/05/19 0000 06/05/19 0845   BP: (!) 121/58 134/63 131/73 (!) 145/79   Pulse: 70 92 73 76   Resp: 16 16 16 16   Temp: 97.6 °F (36.4 °C) 97.1 °F (36.2 °C) 97.9 °F (36.6 °C) 97.8 °F (36.6 °C)   TempSrc: Temporal Temporal Temporal Temporal   SpO2: 96% 95% 96% 94%   Weight:       Height:         No intake/output data recorded. PHYSICAL:  General appearance:  Comfortable. Pain Description: mild and moderate    NEUROLOGIC:   GCS:  15  4 - Opens eyes on own   6 - Follows simple motor commands  5 - Alert and oriented  Complains of cervical tenderness over C6 area. Cervical collar in place. Pupil size:  Left 3 mm  Right 3 mm  Pupil reaction: Yes  Wiggles fingers: Left Yes   Right   Yes    Hand grasp:   Left   Yes     Right   Yes  Wiggles toes: Left   Yes    Right   Yes  Plantar flexion:  Left   Yes  Right Yes  HEENT:  Eyes clear. PERRLA. Chest: Clear to ausculation bilaterally. SMI  CV:  S1 S2.  RRR    Abdomen:  SNTND +BS  Extremities:  Atraumatic  Skin:  Warm & dry  Vascular:peripheral pulses symmetrical  Lines:  Peripheral    CONSULTS: none    PROCEDURES: none    MRI Cervical spine.   06/05/2019.  1. Study limited by suboptimal image quality. 2. Minimally displaced fracture of the C6 spinous process. 3. Diffuse edema in the posterior paraspinal soft tissues, most notably from C3 through C6. The appearance is concerning for disruption of the interspinous and/or supraspinous ligaments at these levels. The anterior longitudinal ligament, posterior longitudinal  ligament, and ligamentum flavum appear intact. 4. Edema in the posterior aspect of the opposing vertebral endplates at N8-C9, which may be due to recent trauma. 5. No definite spinal cord injury or epidural hematoma. 6. Multilevel degenerative disc disease with mild cervical canal  stenosis and mild to moderate neural foraminal narrowing from C3  through C7, as detailed in the body of the report. ASSESSMENT/PLAN:  Active Problems:    Hematoma of spleen, closed, initial encounter    Traumatic hemoperitoneum    Closed traumatic minimally displaced fracture of one rib of left side    Acute blood loss anemia    Laceration of spleen    Multiple closed fractures of ribs of left side  Resolved Problems:    * No resolved hospital problems. *    Neuro:  Fall 1 feet. Fx C4 SP. Cervical spine ligament injury -  Cervical collar. Neurosurgery. CV: No acute issues. Hx of Hyperlipidemia -  Monitor hemodynamics. Statin. Pulm: No acute issues. Hx fo tobacco abuse - Room air. Monitor RR & SpO2. Encourage cough, SMI & deep breathing. Nicoderm. GI: Grade 3 spllen laceration. BMI 25 - Monitor bowel function. Diet:  General.   Zofran. Renal: No acute issues. ID: No acute issues  Endocrine: No acute issues. MSK: No acute issues. Deconditioned - ROM. Turn & reposition. PT/OT AM PAC 18/24. Monitor for skin breakdown. Heme: Acute blood loss anemia - Monitor CBC.         Bowel regime:  Colace, Senna and MOM  Pain control/Sedation: Dilaudid intermittent IV, Oxycodone, Tylenol and Robaxin  DVT prophylaxis: SCD and Lovenox  GI: Diet  Code status: Full  Patient/Family update:  Questions answered. Support given.     Disposition:  Continue 5WE    Electronically signed by Saran Fairbanks RN MSN APRN-NP [facility] NP  CCNS CCRN 6/5/2019 10:57 AM

## 2019-06-05 NOTE — ANESTHESIA POSTPROCEDURE EVALUATION
Department of Anesthesiology  Postprocedure Note    Patient: Isaias Elliott  MRN: [id number]  YOB: 1970  Date of evaluation: 6/5/2019  Time:  7:22 PM     Procedure Summary     Date:  06/05/19 Room / Location:  [facility]  / [facility]    Anesthesia Start:  1501 Anesthesia Stop:  3133    Procedure:  LAPAROTOMY EXPLORATORY,SPLEENECTOMY (N/A ) Diagnosis:  (SPLEEN)    Surgeon:  Nathen Tapia MD Responsible Provider:  Celestine Haque MD    Anesthesia Type:  general ASA Status:  4 - Emergent          Anesthesia Type: general    Rubin Phase I: Rubin Score: 9    Rubin Phase II:      Last vitals: Reviewed and per EMR flowsheets.        Anesthesia Post Evaluation    Patient location during evaluation: PACU  Patient participation: complete - patient participated  Level of consciousness: awake  Airway patency: patent  Nausea & Vomiting: no nausea and no vomiting  Complications: no  Cardiovascular status: hemodynamically stable  Respiratory status: acceptable  Hydration status: stable

## 2019-06-06 LAB
ANION GAP SERPL CALCULATED.3IONS-SCNC: 10 MMOL/L (ref 7–16)
BUN BLDV-MCNC: 29 MG/DL (ref 6–20)
CALCIUM SERPL-MCNC: 8.5 MG/DL (ref 8.6–10.2)
CHLORIDE BLD-SCNC: 100 MMOL/L (ref 98–107)
CO2: 25 MMOL/L (ref 22–29)
CREAT SERPL-MCNC: 1 MG/DL (ref 0.7–1.2)
GFR AFRICAN AMERICAN: >60
GFR NON-AFRICAN AMERICAN: >60 ML/MIN/1.73
GLUCOSE BLD-MCNC: 122 MG/DL (ref 74–99)
HCT VFR BLD CALC: 24.1 % (ref 37–54)
HEMOGLOBIN: 8 G/DL (ref 12.5–16.5)
MCH RBC QN AUTO: 30.2 PG (ref 26–35)
MCHC RBC AUTO-ENTMCNC: 33.2 % (ref 32–34.5)
MCV RBC AUTO: 90.9 FL (ref 80–99.9)
PDW BLD-RTO: 13.7 FL (ref 11.5–15)
PLATELET # BLD: 287 E9/L (ref 130–450)
PMV BLD AUTO: 10.8 FL (ref 7–12)
POTASSIUM SERPL-SCNC: 5 MMOL/L (ref 3.5–5)
RBC # BLD: 2.65 E12/L (ref 3.8–5.8)
SODIUM BLD-SCNC: 135 MMOL/L (ref 132–146)
WBC # BLD: 20.1 E9/L (ref 4.5–11.5)

## 2019-06-06 PROCEDURE — 6370000000 HC RX 637 (ALT 250 FOR IP): Performed by: STUDENT IN AN ORGANIZED HEALTH CARE EDUCATION/TRAINING PROGRAM

## 2019-06-06 PROCEDURE — 1200000000 HC SEMI PRIVATE

## 2019-06-06 PROCEDURE — 80048 BASIC METABOLIC PNL TOTAL CA: CPT

## 2019-06-06 PROCEDURE — 6360000002 HC RX W HCPCS: Performed by: STUDENT IN AN ORGANIZED HEALTH CARE EDUCATION/TRAINING PROGRAM

## 2019-06-06 PROCEDURE — 2580000003 HC RX 258: Performed by: SURGERY

## 2019-06-06 PROCEDURE — 2580000003 HC RX 258: Performed by: STUDENT IN AN ORGANIZED HEALTH CARE EDUCATION/TRAINING PROGRAM

## 2019-06-06 PROCEDURE — 2500000003 HC RX 250 WO HCPCS: Performed by: STUDENT IN AN ORGANIZED HEALTH CARE EDUCATION/TRAINING PROGRAM

## 2019-06-06 PROCEDURE — 85027 COMPLETE CBC AUTOMATED: CPT

## 2019-06-06 PROCEDURE — 36415 COLL VENOUS BLD VENIPUNCTURE: CPT

## 2019-06-06 RX ORDER — DEXTROSE, SODIUM CHLORIDE, AND POTASSIUM CHLORIDE 5; .45; .15 G/100ML; G/100ML; G/100ML
INJECTION INTRAVENOUS CONTINUOUS
Status: DISCONTINUED | OUTPATIENT
Start: 2019-06-06 | End: 2019-06-06

## 2019-06-06 RX ORDER — DEXTROSE AND SODIUM CHLORIDE 5; .9 G/100ML; G/100ML
INJECTION, SOLUTION INTRAVENOUS CONTINUOUS
Status: DISCONTINUED | OUTPATIENT
Start: 2019-06-06 | End: 2019-06-09

## 2019-06-06 RX ADMIN — OXYCODONE HYDROCHLORIDE 10 MG: 10 TABLET ORAL at 07:50

## 2019-06-06 RX ADMIN — Medication: at 13:29

## 2019-06-06 RX ADMIN — ONDANSETRON HYDROCHLORIDE 4 MG: 2 SOLUTION INTRAMUSCULAR; INTRAVENOUS at 12:03

## 2019-06-06 RX ADMIN — HYDROMORPHONE HYDROCHLORIDE 0.5 MG: 1 INJECTION, SOLUTION INTRAMUSCULAR; INTRAVENOUS; SUBCUTANEOUS at 15:07

## 2019-06-06 RX ADMIN — Medication: at 09:29

## 2019-06-06 RX ADMIN — HYDROMORPHONE HYDROCHLORIDE 0.5 MG: 1 INJECTION, SOLUTION INTRAMUSCULAR; INTRAVENOUS; SUBCUTANEOUS at 17:46

## 2019-06-06 RX ADMIN — DEXTROSE MONOHYDRATE, SODIUM CHLORIDE, AND POTASSIUM CHLORIDE: 50; 4.5; 1.49 INJECTION, SOLUTION INTRAVENOUS at 09:30

## 2019-06-06 RX ADMIN — OXYCODONE HYDROCHLORIDE 10 MG: 10 TABLET ORAL at 19:21

## 2019-06-06 RX ADMIN — Medication 10 MG: at 12:30

## 2019-06-06 RX ADMIN — HYDROMORPHONE HYDROCHLORIDE 0.5 MG: 1 INJECTION, SOLUTION INTRAMUSCULAR; INTRAVENOUS; SUBCUTANEOUS at 08:56

## 2019-06-06 RX ADMIN — METHOCARBAMOL TABLETS 1500 MG: 750 TABLET, COATED ORAL at 09:29

## 2019-06-06 RX ADMIN — DEXTROSE AND SODIUM CHLORIDE: 5; 900 INJECTION, SOLUTION INTRAVENOUS at 12:04

## 2019-06-06 RX ADMIN — HYDROMORPHONE HYDROCHLORIDE 0.5 MG: 1 INJECTION, SOLUTION INTRAMUSCULAR; INTRAVENOUS; SUBCUTANEOUS at 12:04

## 2019-06-06 RX ADMIN — HYDROMORPHONE HYDROCHLORIDE 0.5 MG: 1 INJECTION, SOLUTION INTRAMUSCULAR; INTRAVENOUS; SUBCUTANEOUS at 20:57

## 2019-06-06 RX ADMIN — HYDROMORPHONE HYDROCHLORIDE 0.5 MG: 1 INJECTION, SOLUTION INTRAMUSCULAR; INTRAVENOUS; SUBCUTANEOUS at 02:16

## 2019-06-06 RX ADMIN — ACETAMINOPHEN 650 MG: 325 TABLET, FILM COATED ORAL at 09:30

## 2019-06-06 RX ADMIN — HYDROMORPHONE HYDROCHLORIDE 0.5 MG: 1 INJECTION, SOLUTION INTRAMUSCULAR; INTRAVENOUS; SUBCUTANEOUS at 05:16

## 2019-06-06 RX ADMIN — Medication 10 ML: at 21:01

## 2019-06-06 RX ADMIN — OXYCODONE HYDROCHLORIDE 10 MG: 10 TABLET ORAL at 03:18

## 2019-06-06 RX ADMIN — DOCUSATE SODIUM 100 MG: 100 CAPSULE, LIQUID FILLED ORAL at 09:29

## 2019-06-06 RX ADMIN — OXYCODONE HYDROCHLORIDE 10 MG: 10 TABLET ORAL at 13:28

## 2019-06-06 RX ADMIN — ENOXAPARIN SODIUM 30 MG: 30 INJECTION SUBCUTANEOUS at 21:01

## 2019-06-06 RX ADMIN — ONDANSETRON HYDROCHLORIDE 4 MG: 2 SOLUTION INTRAMUSCULAR; INTRAVENOUS at 17:47

## 2019-06-06 RX ADMIN — Medication: at 21:01

## 2019-06-06 RX ADMIN — ONDANSETRON HYDROCHLORIDE 4 MG: 2 SOLUTION INTRAMUSCULAR; INTRAVENOUS at 05:28

## 2019-06-06 ASSESSMENT — PAIN DESCRIPTION - ONSET
ONSET: ON-GOING

## 2019-06-06 ASSESSMENT — PAIN DESCRIPTION - LOCATION
LOCATION: ABDOMEN

## 2019-06-06 ASSESSMENT — PAIN SCALES - GENERAL
PAINLEVEL_OUTOF10: 6
PAINLEVEL_OUTOF10: 9
PAINLEVEL_OUTOF10: 9
PAINLEVEL_OUTOF10: 8
PAINLEVEL_OUTOF10: 10
PAINLEVEL_OUTOF10: 3
PAINLEVEL_OUTOF10: 7
PAINLEVEL_OUTOF10: 10
PAINLEVEL_OUTOF10: 9
PAINLEVEL_OUTOF10: 8
PAINLEVEL_OUTOF10: 3
PAINLEVEL_OUTOF10: 9
PAINLEVEL_OUTOF10: 8
PAINLEVEL_OUTOF10: 7
PAINLEVEL_OUTOF10: 4
PAINLEVEL_OUTOF10: 0
PAINLEVEL_OUTOF10: 9
PAINLEVEL_OUTOF10: 0
PAINLEVEL_OUTOF10: 5
PAINLEVEL_OUTOF10: 0
PAINLEVEL_OUTOF10: 0

## 2019-06-06 ASSESSMENT — PAIN DESCRIPTION - PAIN TYPE
TYPE: SURGICAL PAIN

## 2019-06-06 ASSESSMENT — PAIN DESCRIPTION - PROGRESSION
CLINICAL_PROGRESSION: NOT CHANGED
CLINICAL_PROGRESSION: GRADUALLY WORSENING
CLINICAL_PROGRESSION: NOT CHANGED

## 2019-06-06 ASSESSMENT — PAIN - FUNCTIONAL ASSESSMENT
PAIN_FUNCTIONAL_ASSESSMENT: PREVENTS OR INTERFERES SOME ACTIVE ACTIVITIES AND ADLS
PAIN_FUNCTIONAL_ASSESSMENT: PREVENTS OR INTERFERES WITH ALL ACTIVE AND SOME PASSIVE ACTIVITIES
PAIN_FUNCTIONAL_ASSESSMENT: PREVENTS OR INTERFERES SOME ACTIVE ACTIVITIES AND ADLS
PAIN_FUNCTIONAL_ASSESSMENT: PREVENTS OR INTERFERES SOME ACTIVE ACTIVITIES AND ADLS
PAIN_FUNCTIONAL_ASSESSMENT: PREVENTS OR INTERFERES WITH ALL ACTIVE AND SOME PASSIVE ACTIVITIES
PAIN_FUNCTIONAL_ASSESSMENT: PREVENTS OR INTERFERES WITH MANY ACTIVE NOT PASSIVE ACTIVITIES
PAIN_FUNCTIONAL_ASSESSMENT: PREVENTS OR INTERFERES SOME ACTIVE ACTIVITIES AND ADLS
PAIN_FUNCTIONAL_ASSESSMENT: PREVENTS OR INTERFERES SOME ACTIVE ACTIVITIES AND ADLS

## 2019-06-06 ASSESSMENT — PAIN DESCRIPTION - FREQUENCY
FREQUENCY: CONTINUOUS

## 2019-06-06 ASSESSMENT — PAIN DESCRIPTION - DESCRIPTORS
DESCRIPTORS: ACHING;DISCOMFORT;SORE
DESCRIPTORS: ACHING;CONSTANT;DISCOMFORT
DESCRIPTORS: ACHING;CONSTANT
DESCRIPTORS: ACHING;CONSTANT;DISCOMFORT
DESCRIPTORS: ACHING;CONSTANT;DISCOMFORT
DESCRIPTORS: ACHING;DISCOMFORT;SORE
DESCRIPTORS: CRAMPING;TENDER
DESCRIPTORS: ACHING;DISCOMFORT;SORE
DESCRIPTORS: ACHING;DISCOMFORT;SHARP
DESCRIPTORS: CONSTANT;STABBING;THROBBING
DESCRIPTORS: ACHING;CONSTANT;DISCOMFORT

## 2019-06-06 ASSESSMENT — PAIN DESCRIPTION - DIRECTION: RADIATING_TOWARDS: LEFT SHOULDER

## 2019-06-06 ASSESSMENT — PAIN DESCRIPTION - ORIENTATION: ORIENTATION: MID

## 2019-06-06 NOTE — PROGRESS NOTES
GENERAL SURGERY  DAILY PROGRESS NOTE  6/6/2019    Subjective:  No acute events  Pain uncontrolled per patient. Per RN, patient sleeps after dilaudid. Reports pain once awake. NPO  Nausea, no emesis. Resolved w/ Zofran. -F/-BM  Reported Belching    Objective:  /77   Pulse 99   Temp 99.9 °F (37.7 °C) (Temporal)   Resp 18   Ht 6' (1.829 m)   Wt 185 lb (83.9 kg)   SpO2 94%   BMI 25.09 kg/m²     General: NAD, awake and alert. A&Ox3  Head: Normocephalic, atraumatic  Eyes: PERRLA, EOMI. Lungs: No increased work of breathing. Cardiovascular: RRR. Abdomen: Soft, ND, TTP around midline incision. Dressing w/ minimal strike-through. No rebound, guarding or rigidity. Extremities: Atraumatic, full range of motion  Skin: Warm, dry and intact    Assessment/Plan:  50 y.o. male w/ grade IV splenic laceration s/p ex lap w/ splenectomy POD#1    Pain and nausea control PRN  NPO. Sips w/ meds. Await return of bowel function until advancing diet.   mIVF's  Remove Gardiner catheter  OOB/AAT  PT/OT  DVT ppx  Bowel regimen    Electronically signed by Michela Chapman MD on 6/6/2019 at 12:12 PM    I saw and examined the patient  Agree with Dr Sirisha Bright A/P  Electronically signed by Shellie Jane MD on 6/6/2019 at 5:16 PM

## 2019-06-06 NOTE — PROGRESS NOTES
Neurosurg progress note  VITALS:  BP (!) 145/80   Pulse 93   Temp 99.7 °F (37.6 °C) (Temporal)   Resp 16   Ht 6' (1.829 m)   Wt 185 lb (83.9 kg)   SpO2 95%   BMI 25.09 kg/m²   24HR INTAKE/OUTPUT:    Intake/Output Summary (Last 24 hours) at 2019 0836  Last data filed at 2019 0536  Gross per 24 hour   Intake 3011 ml   Output 1350 ml   Net 1661 ml     Xr Chest Standard (2 Vw)    Result Date: 2019  Patient MRN:  [id number] : 1970 Age: 50 years Gender: Male Order Date:  2019 12:45 PM EXAM: XR CHEST (2 VW) 3 images INDICATION:  dyspnea, fall dyspnea, fall COMPARISON: 2019 FINDINGS: The heart and the mediastinal structures are normal. There is mild COPD. There is minimal infiltrate/atelectasis in the right lung base. There is no pneumothorax. There is gastric distention. COPD with minimal atelectasis/infiltrates in the right lung base. Ct Head Wo Contrast    Result Date: 2019  Patient MRN:  [id number] : 1970 Age: 50 years Gender: Male Order Date:  2019 12:45 PM EXAM: CT HEAD WO CONTRAST NUMBER OF IMAGES:  161 INDICATION:  HEAD TRAUMA, CLOSED, MILD, GCS >= 13, NO RISK FACTORS, NEURO EXAM NORMAL COMPARISON: None Technique: Low-dose CT  acquisition technique included one of following options; 1 . Automated exposure control, 2. Adjustment of MA and or KV according to patient's size or 3. Use of iterative reconstruction. Multiple CT sections were obtained with sagittal and coronal MPR reconstructions. The ventricles are unremarkable. The gyri and sulci appear unremarkable. The white matter appears unremarkable. There is no evidence for hemorrhage. There is no infarct identified. There is no mass effect identified. There is no mass identified. Unremarkable scan of the head.      Ct Chest W Contrast    Result Date: 2019  Patient MRN:  [id number] : 1970 Age: 50 years Gender: Male Order Date:  2019 12:45 PM EXAM: CT CHEST W CONTRAST, CT ABDOMEN PELVIS W IV CONTRAST Dosage: 1184 mGY-cm Contrast: 110 mL Isovue-370 vertebral body height in the thoracic spine is normal. INDICATION:  left chest pain, fell 12 feet  COMPARISON: None FINDINGS:  Vertebral body height in the thoracic spine is normal. There is a subcutaneous nodule medially in the right hemithorax. Thoracic aorta is normal. Heart size is normal. There is no hilar or mediastinal lymph nodes. There are small left axillary nodes. There is borderline hyperinflation in the upper lungs. There is minimal scarring in the right middle lobe and left lung base. No obvious thoracic fractures are noted. There is a moderate amount of upper abdominal ascites/fluid. Liver,pancreas, and kidneys are normal aside from an upper pole right renal cyst. There is a small lower pole left renal cyst. Spleen has a couple small low-attenuation zones on its periphery (series 503 image 53). Considering the amount pelvic fluid/hemorrhage findings are suspicious of a subtle splenic laceration. There is a significant amount of pelvic ascites. The density measures 61 Hounsfield. Bladder appears normal. No pelvic fractures are identified. Small to moderate amount of upper abdominal ascites/fluid and significant amount of pelvic fluid. The density of this has Hounsfield units measuring 61, and is most compatible with hemorrhage. There are some ill-defined subtle densities in the posterior aspect of the spleen. Findings are concerning for a subtle splenic laceration. No other visceral organ abnormality is identified.  CRITICAL FINDING: Results were called and read back to the emergency room on 2019 at 1430 hours    Ct Cervical Spine Wo Contrast    Result Date: 2019  Patient MRN:  [id number] : 1970 Age: 50 years Gender: Male Order Date:  2019 12:45 PM EXAM: CT CERVICAL SPINE WO CONTRAST NUMBER OF IMAGES:  732 INDICATION:  NECK PAIN FOLLOWING TRAUMA COMPARISON: None Multiple CT sections were obtained with sagittal and coronal MPR reconstructions. Technique: Low-dose CT  acquisition technique included one of following options; 1 . Automated exposure control, 2. Adjustment of MA and or KV according to patient's size or 3. Use of iterative reconstruction. Images reveal the vertebral bodies, their disc spaces, and the posterior facet joints to appear to be intact. There are severe degenerative changes of the spine present. There are severe degenerative changes of the facets. .    Degenerative changes    Ct Abdomen Pelvis W Iv Contrast Additional Contrast? None    Result Date: 2019  Patient MRN:  [id number] : 1970 Age: 50 years Gender: Male Order Date:  2019 12:45 PM EXAM: CT CHEST W CONTRAST, CT ABDOMEN PELVIS W IV CONTRAST Dosage: 1184 mGY-cm Contrast: 110 mL Isovue-370 vertebral body height in the thoracic spine is normal. INDICATION:  left chest pain, fell 12 feet  COMPARISON: None FINDINGS:  Vertebral body height in the thoracic spine is normal. There is a subcutaneous nodule medially in the right hemithorax. Thoracic aorta is normal. Heart size is normal. There is no hilar or mediastinal lymph nodes. There are small left axillary nodes. There is borderline hyperinflation in the upper lungs. There is minimal scarring in the right middle lobe and left lung base. No obvious thoracic fractures are noted. There is a moderate amount of upper abdominal ascites/fluid. Liver,pancreas, and kidneys are normal aside from an upper pole right renal cyst. There is a small lower pole left renal cyst. Spleen has a couple small low-attenuation zones on its periphery (series 503 image 53). Considering the amount pelvic fluid/hemorrhage findings are suspicious of a subtle splenic laceration. There is a significant amount of pelvic ascites. The density measures 61 Hounsfield. Bladder appears normal. No pelvic fractures are identified.      Small to moderate amount of upper abdominal ascites/fluid and significant amount of pelvic fluid. The density of this has Hounsfield units measuring 61, and is most compatible with hemorrhage. There are some ill-defined subtle densities in the posterior aspect of the spleen. Findings are concerning for a subtle splenic laceration. No other visceral organ abnormality is identified.  CRITICAL FINDING: Results were called and read back to the emergency room on 5/31/2019 at 1430 hours    CBC:   Lab Results   Component Value Date    WBC 22.6 06/05/2019    RBC 3.36 06/05/2019    HGB 10.0 06/05/2019    HCT 29.9 06/05/2019    MCV 89.0 06/05/2019    MCH 29.8 06/05/2019    MCHC 33.4 06/05/2019    RDW 13.3 06/05/2019     06/05/2019    MPV 11.0 06/05/2019     BMP:    Lab Results   Component Value Date     06/05/2019    K 4.5 06/05/2019     06/05/2019    CO2 25 06/05/2019    BUN 16 06/05/2019    LABALBU 3.0 06/03/2019    CREATININE 0.9 06/05/2019    CALCIUM 9.3 06/05/2019    GFRAA >60 06/05/2019    LABGLOM >60 06/05/2019    GLUCOSE 105 06/05/2019      bisacodyl  10 mg Rectal Daily    sodium chloride flush  10 mL Intravenous Once    lidocaine  1 patch Transdermal Daily    methocarbamol  1,500 mg Oral 4x Daily    enoxaparin  30 mg Subcutaneous BID    docusate sodium  100 mg Oral BID    senna  1 tablet Oral Nightly    sodium chloride flush  10 mL Intravenous 2 times per day    bacitracin zinc   Topical TID    acetaminophen  650 mg Oral Q4H    atorvastatin  40 mg Oral Nightly    nicotine  1 patch Transdermal Daily     Awake and alert,perrl eomi,collar in place  Assessment:  Patient Active Problem List   Diagnosis    Chest pain    Tobacco use    Atypical chest pain    Cocaine use    Nodule of left lung    Numbness and tingling of right lower extremity    Unstable angina (HCC)    Leukocytosis    Hep C w/o coma, chronic (HCC)    Hematoma of spleen, closed, initial encounter    Traumatic hemoperitoneum    Closed traumatic minimally displaced fracture of one rib of left side    Acute

## 2019-06-06 NOTE — PLAN OF CARE
Problem: Falls - Risk of:  Goal: Will remain free from falls  Description  Will remain free from falls  Outcome: Ongoing  Goal: Absence of physical injury  Description  Absence of physical injury  Outcome: Ongoing     Problem: Pain:  Goal: Pain level will decrease  Description  Pain level will decrease  6/6/2019 1646 by Marina Sutherland RN  Outcome: Ongoing  6/6/2019 1423 by Jose M Dykes RN  Outcome: Ongoing  Goal: Control of acute pain  Description  Control of acute pain  Outcome: Ongoing     Problem: Anxiety/Stress:  Goal: Level of anxiety will decrease  Description  Level of anxiety will decrease  Outcome: Ongoing     Problem: Fluid Volume - Imbalance:  Goal: Absence of imbalanced fluid volume signs and symptoms  Description  Absence of imbalanced fluid volume signs and symptoms  Outcome: Ongoing     Problem: Nutrition Deficit:  Goal: Ability to achieve adequate nutritional intake will improve  Description  Ability to achieve adequate nutritional intake will improve  Outcome: Ongoing     Problem: Skin Integrity - Impaired:  Goal: Will show no infection signs and symptoms  Description  Will show no infection signs and symptoms  Outcome: Ongoing  Goal: Absence of new skin breakdown  Description  Absence of new skin breakdown  Outcome: Ongoing     Problem: Musculor/Skeletal Functional Status  Goal: Highest potential functional level  Outcome: Ongoing  Goal: Absence of falls  Outcome: Ongoing     Problem: Risk for Impaired Skin Integrity  Goal: Tissue integrity - skin and mucous membranes  Description  Structural intactness and normal physiological function of skin and  mucous membranes.   Outcome: Ongoing

## 2019-06-07 LAB
BLOOD BANK DISPENSE STATUS: NORMAL
BLOOD BANK PRODUCT CODE: NORMAL
BPU ID: NORMAL
DESCRIPTION BLOOD BANK: NORMAL
HCT VFR BLD CALC: 21.2 % (ref 37–54)
HEMOGLOBIN: 6.9 G/DL (ref 12.5–16.5)
MCH RBC QN AUTO: 29.7 PG (ref 26–35)
MCHC RBC AUTO-ENTMCNC: 32.5 % (ref 32–34.5)
MCV RBC AUTO: 91.4 FL (ref 80–99.9)
PDW BLD-RTO: 13.8 FL (ref 11.5–15)
PLATELET # BLD: 306 E9/L (ref 130–450)
PMV BLD AUTO: 10.6 FL (ref 7–12)
RBC # BLD: 2.32 E12/L (ref 3.8–5.8)
WBC # BLD: 23.2 E9/L (ref 4.5–11.5)

## 2019-06-07 PROCEDURE — 97530 THERAPEUTIC ACTIVITIES: CPT

## 2019-06-07 PROCEDURE — 86923 COMPATIBILITY TEST ELECTRIC: CPT

## 2019-06-07 PROCEDURE — 6370000000 HC RX 637 (ALT 250 FOR IP): Performed by: STUDENT IN AN ORGANIZED HEALTH CARE EDUCATION/TRAINING PROGRAM

## 2019-06-07 PROCEDURE — 36430 TRANSFUSION BLD/BLD COMPNT: CPT

## 2019-06-07 PROCEDURE — 1200000000 HC SEMI PRIVATE

## 2019-06-07 PROCEDURE — 2580000003 HC RX 258: Performed by: STUDENT IN AN ORGANIZED HEALTH CARE EDUCATION/TRAINING PROGRAM

## 2019-06-07 PROCEDURE — 6360000002 HC RX W HCPCS: Performed by: STUDENT IN AN ORGANIZED HEALTH CARE EDUCATION/TRAINING PROGRAM

## 2019-06-07 PROCEDURE — 36415 COLL VENOUS BLD VENIPUNCTURE: CPT

## 2019-06-07 PROCEDURE — P9016 RBC LEUKOCYTES REDUCED: HCPCS

## 2019-06-07 PROCEDURE — 2700000000 HC OXYGEN THERAPY PER DAY

## 2019-06-07 PROCEDURE — 2580000003 HC RX 258: Performed by: SURGERY

## 2019-06-07 PROCEDURE — L0172 CERV COL SR FOAM 2PC PRE OTS: HCPCS

## 2019-06-07 PROCEDURE — 99232 SBSQ HOSP IP/OBS MODERATE 35: CPT | Performed by: SURGERY

## 2019-06-07 PROCEDURE — 97164 PT RE-EVAL EST PLAN CARE: CPT

## 2019-06-07 PROCEDURE — 85027 COMPLETE CBC AUTOMATED: CPT

## 2019-06-07 PROCEDURE — 97168 OT RE-EVAL EST PLAN CARE: CPT

## 2019-06-07 RX ORDER — 0.9 % SODIUM CHLORIDE 0.9 %
250 INTRAVENOUS SOLUTION INTRAVENOUS ONCE
Status: COMPLETED | OUTPATIENT
Start: 2019-06-07 | End: 2019-06-07

## 2019-06-07 RX ADMIN — HYDROMORPHONE HYDROCHLORIDE 0.5 MG: 1 INJECTION, SOLUTION INTRAMUSCULAR; INTRAVENOUS; SUBCUTANEOUS at 03:01

## 2019-06-07 RX ADMIN — HYDROMORPHONE HYDROCHLORIDE 0.5 MG: 1 INJECTION, SOLUTION INTRAMUSCULAR; INTRAVENOUS; SUBCUTANEOUS at 22:35

## 2019-06-07 RX ADMIN — ONDANSETRON HYDROCHLORIDE 4 MG: 2 SOLUTION INTRAMUSCULAR; INTRAVENOUS at 06:01

## 2019-06-07 RX ADMIN — HYDROMORPHONE HYDROCHLORIDE 0.5 MG: 1 INJECTION, SOLUTION INTRAMUSCULAR; INTRAVENOUS; SUBCUTANEOUS at 12:15

## 2019-06-07 RX ADMIN — DOCUSATE SODIUM 100 MG: 100 CAPSULE, LIQUID FILLED ORAL at 20:37

## 2019-06-07 RX ADMIN — DEXTROSE AND SODIUM CHLORIDE: 5; 900 INJECTION, SOLUTION INTRAVENOUS at 00:10

## 2019-06-07 RX ADMIN — METHOCARBAMOL TABLETS 1500 MG: 750 TABLET, COATED ORAL at 13:25

## 2019-06-07 RX ADMIN — OXYCODONE HYDROCHLORIDE 10 MG: 10 TABLET ORAL at 13:25

## 2019-06-07 RX ADMIN — HYDROMORPHONE HYDROCHLORIDE 0.5 MG: 1 INJECTION, SOLUTION INTRAMUSCULAR; INTRAVENOUS; SUBCUTANEOUS at 00:12

## 2019-06-07 RX ADMIN — HYDROMORPHONE HYDROCHLORIDE 0.5 MG: 1 INJECTION, SOLUTION INTRAMUSCULAR; INTRAVENOUS; SUBCUTANEOUS at 09:06

## 2019-06-07 RX ADMIN — METHOCARBAMOL TABLETS 1500 MG: 750 TABLET, COATED ORAL at 16:34

## 2019-06-07 RX ADMIN — OXYCODONE HYDROCHLORIDE 10 MG: 10 TABLET ORAL at 00:12

## 2019-06-07 RX ADMIN — ENOXAPARIN SODIUM 30 MG: 30 INJECTION SUBCUTANEOUS at 20:36

## 2019-06-07 RX ADMIN — Medication: at 20:37

## 2019-06-07 RX ADMIN — OXYCODONE HYDROCHLORIDE 10 MG: 10 TABLET ORAL at 21:21

## 2019-06-07 RX ADMIN — Medication 10 ML: at 09:06

## 2019-06-07 RX ADMIN — ACETAMINOPHEN 650 MG: 325 TABLET, FILM COATED ORAL at 13:27

## 2019-06-07 RX ADMIN — ATORVASTATIN CALCIUM 40 MG: 40 TABLET, FILM COATED ORAL at 20:37

## 2019-06-07 RX ADMIN — OXYCODONE HYDROCHLORIDE 10 MG: 10 TABLET ORAL at 04:23

## 2019-06-07 RX ADMIN — ONDANSETRON HYDROCHLORIDE 4 MG: 2 SOLUTION INTRAMUSCULAR; INTRAVENOUS at 12:26

## 2019-06-07 RX ADMIN — HYDROMORPHONE HYDROCHLORIDE 0.5 MG: 1 INJECTION, SOLUTION INTRAMUSCULAR; INTRAVENOUS; SUBCUTANEOUS at 15:15

## 2019-06-07 RX ADMIN — METHOCARBAMOL TABLETS 1500 MG: 750 TABLET, COATED ORAL at 20:36

## 2019-06-07 RX ADMIN — OXYCODONE HYDROCHLORIDE 10 MG: 10 TABLET ORAL at 17:22

## 2019-06-07 RX ADMIN — Medication: at 13:32

## 2019-06-07 RX ADMIN — ACETAMINOPHEN 650 MG: 325 TABLET, FILM COATED ORAL at 17:21

## 2019-06-07 RX ADMIN — SENNOSIDES 8.6 MG: 8.6 TABLET, FILM COATED ORAL at 20:37

## 2019-06-07 RX ADMIN — OXYCODONE HYDROCHLORIDE 10 MG: 10 TABLET ORAL at 09:55

## 2019-06-07 RX ADMIN — HYDROMORPHONE HYDROCHLORIDE 0.5 MG: 1 INJECTION, SOLUTION INTRAMUSCULAR; INTRAVENOUS; SUBCUTANEOUS at 18:31

## 2019-06-07 RX ADMIN — ONDANSETRON HYDROCHLORIDE 4 MG: 2 SOLUTION INTRAMUSCULAR; INTRAVENOUS at 00:10

## 2019-06-07 RX ADMIN — HYDROMORPHONE HYDROCHLORIDE 0.5 MG: 1 INJECTION, SOLUTION INTRAMUSCULAR; INTRAVENOUS; SUBCUTANEOUS at 06:01

## 2019-06-07 RX ADMIN — DEXTROSE AND SODIUM CHLORIDE: 5; 900 INJECTION, SOLUTION INTRAVENOUS at 09:58

## 2019-06-07 RX ADMIN — SODIUM CHLORIDE 250 ML: 9 INJECTION, SOLUTION INTRAVENOUS at 14:19

## 2019-06-07 ASSESSMENT — PAIN DESCRIPTION - ONSET
ONSET: ON-GOING

## 2019-06-07 ASSESSMENT — PAIN SCALES - GENERAL
PAINLEVEL_OUTOF10: 8
PAINLEVEL_OUTOF10: 7
PAINLEVEL_OUTOF10: 0
PAINLEVEL_OUTOF10: 5
PAINLEVEL_OUTOF10: 8
PAINLEVEL_OUTOF10: 0
PAINLEVEL_OUTOF10: 7
PAINLEVEL_OUTOF10: 10
PAINLEVEL_OUTOF10: 5
PAINLEVEL_OUTOF10: 0
PAINLEVEL_OUTOF10: 8
PAINLEVEL_OUTOF10: 0
PAINLEVEL_OUTOF10: 5
PAINLEVEL_OUTOF10: 5
PAINLEVEL_OUTOF10: 0
PAINLEVEL_OUTOF10: 6
PAINLEVEL_OUTOF10: 8
PAINLEVEL_OUTOF10: 10
PAINLEVEL_OUTOF10: 7
PAINLEVEL_OUTOF10: 7
PAINLEVEL_OUTOF10: 5
PAINLEVEL_OUTOF10: 10
PAINLEVEL_OUTOF10: 9
PAINLEVEL_OUTOF10: 0
PAINLEVEL_OUTOF10: 8
PAINLEVEL_OUTOF10: 9
PAINLEVEL_OUTOF10: 9

## 2019-06-07 ASSESSMENT — PAIN DESCRIPTION - LOCATION
LOCATION: ABDOMEN

## 2019-06-07 ASSESSMENT — PAIN DESCRIPTION - PAIN TYPE
TYPE: CHRONIC PAIN;SURGICAL PAIN
TYPE: SURGICAL PAIN
TYPE: CHRONIC PAIN;SURGICAL PAIN
TYPE: SURGICAL PAIN
TYPE: CHRONIC PAIN;SURGICAL PAIN
TYPE: SURGICAL PAIN

## 2019-06-07 ASSESSMENT — PAIN DESCRIPTION - DESCRIPTORS
DESCRIPTORS: ACHING;BURNING;SORE
DESCRIPTORS: CONSTANT;CRAMPING;DISCOMFORT
DESCRIPTORS: ACHING;DISCOMFORT;SORE
DESCRIPTORS: ACHING;BURNING;SORE
DESCRIPTORS: ACHING;BURNING;SORE

## 2019-06-07 ASSESSMENT — PAIN DESCRIPTION - PROGRESSION
CLINICAL_PROGRESSION: GRADUALLY IMPROVING
CLINICAL_PROGRESSION: GRADUALLY IMPROVING
CLINICAL_PROGRESSION: NOT CHANGED
CLINICAL_PROGRESSION: GRADUALLY IMPROVING

## 2019-06-07 ASSESSMENT — PAIN DESCRIPTION - FREQUENCY
FREQUENCY: CONTINUOUS

## 2019-06-07 ASSESSMENT — PAIN - FUNCTIONAL ASSESSMENT: PAIN_FUNCTIONAL_ASSESSMENT: PREVENTS OR INTERFERES WITH ALL ACTIVE AND SOME PASSIVE ACTIVITIES

## 2019-06-07 NOTE — PLAN OF CARE
Problem: Increased nutrient needs (NI-5.1)  Goal: Food and/or Nutrient Delivery  Description: Nutrition as medically fessible  Individualized approach for food/nutrient provision.   Outcome: Met This Shift

## 2019-06-07 NOTE — PROGRESS NOTES
[facility]  ATTENDING PHYSICIAN PROGRESS NOTE     I have examined the patient and  reviewed the record. I have reviewed all relevant labs and imaging data. The following summarizes my clinical findings and independent assessment. CC: c spine ligamentous injury and grade IV ruptured spleen    S. Complains of sorenss    O.  Vitals:    06/07/19 0745   BP: 124/73   Pulse: 94   Resp: 16   Temp: 98.4 °F (36.9 °C)   SpO2:      NAD  Custom collar present  Awake and alert x 3  s1s2  Abdomen soft approp tender, inc c/d/i    ASSESSMENT:  Active Problems:    Hematoma of spleen, closed, initial encounter    Traumatic hemoperitoneum    Closed traumatic minimally displaced fracture of one rib of left side    Acute blood loss anemia    Laceration of spleen    Multiple closed fractures of ribs of left side  Resolved Problems:    * No resolved hospital problems.  *       PLAN:  POD 2 s/p splenectomy  Npo  Await bowel function  Cspine ligamentous injury--continue custom collar  Continue to ambulate    Acute blood loss anemia from ruptured spleen--hb 6.9 down from 8--due to equilibration  Normotensive, non tachcardic  Will transfuse 1 unit PRBC    DVT Proph: SCDS/ lovenox       Teagan Dash MD, FACS  6/7/2019  11:04 AM

## 2019-06-07 NOTE — CARE COORDINATION
6/7/2019 social work transition of care  Sw followed up with pt at bedside. Plan remains for home at discharge, has transport. Sw will follow and assist prn.   Electronically signed by CAROLINE Fairbanks on 6/7/2019 at 10:57 AM

## 2019-06-07 NOTE — PROGRESS NOTES
(82.6 kg)(6/7 actual bedscale )  · Admission Body Wt: 186 lb (84.4 kg)(5/31 bed on admit )  · Usual Body Wt: 174 lb (78.9 kg)(7/2018 actual per EMR- per pt statement- UBW ~190 )  · % Weight Change:  ,  noted wt gain x 1 year overall- pt reports being wt stable and UBW trends ~190#   · Ideal Body Wt: 178 lb (80.7 kg), % Ideal Body 102%  · BMI Classification: BMI 18.5 - 24.9 Normal Weight    Nutrition Interventions:   Continue NPO  Continued Inpatient Monitoring, Education Initiated, Coordination of Care(d/w MD patient status & plan for nutrition advancement )    Nutrition Evaluation:   · Evaluation: Goals set   · Goals: nutrition  as medically fessible    · Monitoring: Nutrition Progression, Skin Integrity, Wound Healing, I&O, Weight, Pertinent Labs, Monitor Bowel Function, Mental Status/Confusion      Electronically signed by Marina Johnson on 6/7/19 at 11:48 AM    Contact Number: EXT: [id number]

## 2019-06-07 NOTE — PLAN OF CARE
Problem: Increased nutrient needs (NI-5.1)  Goal: Food and/or Nutrient Delivery  Description  Individualized approach for food/nutrient provision. 6/7/2019 1151 by Hoang Baldwin  Outcome: Met This Shift     Problem: Falls - Risk of:  Goal: Will remain free from falls  Description  Will remain free from falls  Outcome: Ongoing  Goal: Absence of physical injury  Description  Absence of physical injury  Outcome: Ongoing     Problem: Pain:  Goal: Pain level will decrease  Description  Pain level will decrease  Outcome: Ongoing  Goal: Control of acute pain  Description  Control of acute pain  Outcome: Ongoing     Problem: Anxiety/Stress:  Goal: Level of anxiety will decrease  Description  Level of anxiety will decrease  Outcome: Ongoing     Problem: Fluid Volume - Imbalance:  Goal: Absence of imbalanced fluid volume signs and symptoms  Description  Absence of imbalanced fluid volume signs and symptoms  Outcome: Ongoing     Problem: Nutrition Deficit:  Goal: Ability to achieve adequate nutritional intake will improve  Description  Ability to achieve adequate nutritional intake will improve  Outcome: Ongoing     Problem: Skin Integrity - Impaired:  Goal: Will show no infection signs and symptoms  Description  Will show no infection signs and symptoms  Outcome: Ongoing  Goal: Absence of new skin breakdown  Description  Absence of new skin breakdown  Outcome: Ongoing     Problem: Musculor/Skeletal Functional Status  Goal: Highest potential functional level  Outcome: Ongoing  Goal: Absence of falls  Outcome: Ongoing     Problem: Risk for Impaired Skin Integrity  Goal: Tissue integrity - skin and mucous membranes  Description  Structural intactness and normal physiological function of skin and  mucous membranes.   Outcome: Ongoing

## 2019-06-07 NOTE — PROGRESS NOTES
with activity- 30 seconds to recover to baseline with pursed lip breathing). Pt demonstrating fair understanding of education/techniques, requiring additional training / education. At end of session, patient seated in chair with call light and phone within reach, all lines and tubes intact - family present. Pt would benefit from continued skilled OT to increase functional independence and quality of life. (Evaluation time includes thorough review of current medical information, gathering information on past medical history/social history and prior level of function, completion of standardized testing/informal observation of tasks, assessment of data, and development of POC/Goals.)      Assessment of current deficits   Functional mobility [x]  ADLs [x] Strength [x]  Cognition []  Functional transfers  [x] IADLs [x] Safety Awareness [x]  Endurance [x]  Fine Motor Coordination [] Balance [x] Vision/perception [] Sensation []   Gross Motor Coordination [] ROM [] Delirium []                  Motor Control []    Plan of Care:   ADL retraining [x]   Equipment needs [x]   Neuromuscular re-education [x] Energy Conservation Techniques [x]  Functional Transfer training [x] Patient and/or Family Education [x]  Functional Mobility training [x]  Environmental Modifications [x]  Cognitive re-training []   Compensatory techniques for ADLs [x]  Splinting Needs []   Positioning to improve overall function [x]   Therapeutic Activity [x]  Therapeutic Exercise  [x]  Visual/Perceptual: []    Delirium prevention/treatment  []   Other:  []    Rehab Potential: Good for established goals     Patient / Family Goal:  Not stated     Patient and/or family were instructed diagnosis, prognosis/goals and plan of care. Demonstrated fair understanding. [] Malnutrition indicators have been identified and nursing has been notified to ensure a dietitian consult is ordered.        AM-PAC Daily Activity Inpatient   How much help for putting on and taking off regular lower body clothing?: A Lot  How much help for Bathing?: A Lot  How much help for Toileting?: A Little  How much help for putting on and taking off regular upper body clothing?: A Little  How much help for taking care of personal grooming?: A Little  How much help for eating meals?: None  AM-[facility] Inpatient Daily Activity Raw Score: 17  AM-PAC Inpatient ADL T-Scale Score : 37.26  ADL Inpatient CMS 0-100% Score: 50.11  ADL Inpatient CMS G-Code Modifier : CK       Re- Evaluation + 13 timed treatment minutes  Tx Time in: 921  Tx Time out: Ky Craig 48 OTR/L #[id number]

## 2019-06-07 NOTE — PROGRESS NOTES
Neurosurg progress note  VITALS:  /73   Pulse 94   Temp 98.4 °F (36.9 °C) (Temporal)   Resp 16   Ht 6' (1.829 m)   Wt 182 lb (82.6 kg) Comment: Bedscale  SpO2 97%   BMI 24.68 kg/m²   24HR INTAKE/OUTPUT:    Intake/Output Summary (Last 24 hours) at 2019 1105  Last data filed at 2019 0959  Gross per 24 hour   Intake 2736 ml   Output 500 ml   Net 2236 ml     Xr Chest Standard (2 Vw)    Result Date: 2019  Patient MRN:  [id number] : 1970 Age: 50 years Gender: Male Order Date:  2019 12:45 PM EXAM: XR CHEST (2 VW) 3 images INDICATION:  dyspnea, fall dyspnea, fall COMPARISON: 2019 FINDINGS: The heart and the mediastinal structures are normal. There is mild COPD. There is minimal infiltrate/atelectasis in the right lung base. There is no pneumothorax. There is gastric distention. COPD with minimal atelectasis/infiltrates in the right lung base. Ct Head Wo Contrast    Result Date: 2019  Patient MRN:  [id number] : 1970 Age: 50 years Gender: Male Order Date:  2019 12:45 PM EXAM: CT HEAD WO CONTRAST NUMBER OF IMAGES:  161 INDICATION:  HEAD TRAUMA, CLOSED, MILD, GCS >= 13, NO RISK FACTORS, NEURO EXAM NORMAL COMPARISON: None Technique: Low-dose CT  acquisition technique included one of following options; 1 . Automated exposure control, 2. Adjustment of MA and or KV according to patient's size or 3. Use of iterative reconstruction. Multiple CT sections were obtained with sagittal and coronal MPR reconstructions. The ventricles are unremarkable. The gyri and sulci appear unremarkable. The white matter appears unremarkable. There is no evidence for hemorrhage. There is no infarct identified. There is no mass effect identified. There is no mass identified. Unremarkable scan of the head.      Ct Chest W Contrast    Result Date: 2019  Patient MRN:  [id number] : 1970 Age: 50 years Gender: Male Order Date:  2019 12:45 PM EXAM: CT CHEST W CONTRAST, CT ABDOMEN PELVIS W IV CONTRAST Dosage: 1817 mGY-cm Contrast: 110 mL Isovue-370 vertebral body height in the thoracic spine is normal. INDICATION:  left chest pain, fell 12 feet  COMPARISON: None FINDINGS:  Vertebral body height in the thoracic spine is normal. There is a subcutaneous nodule medially in the right hemithorax. Thoracic aorta is normal. Heart size is normal. There is no hilar or mediastinal lymph nodes. There are small left axillary nodes. There is borderline hyperinflation in the upper lungs. There is minimal scarring in the right middle lobe and left lung base. No obvious thoracic fractures are noted. There is a moderate amount of upper abdominal ascites/fluid. Liver,pancreas, and kidneys are normal aside from an upper pole right renal cyst. There is a small lower pole left renal cyst. Spleen has a couple small low-attenuation zones on its periphery (series 503 image 53). Considering the amount pelvic fluid/hemorrhage findings are suspicious of a subtle splenic laceration. There is a significant amount of pelvic ascites. The density measures 61 Hounsfield. Bladder appears normal. No pelvic fractures are identified. Small to moderate amount of upper abdominal ascites/fluid and significant amount of pelvic fluid. The density of this has Hounsfield units measuring 61, and is most compatible with hemorrhage. There are some ill-defined subtle densities in the posterior aspect of the spleen. Findings are concerning for a subtle splenic laceration. No other visceral organ abnormality is identified.  CRITICAL FINDING: Results were called and read back to the emergency room on 2019 at 1430 hours    Ct Cervical Spine Wo Contrast    Result Date: 2019  Patient MRN:  [id number] : 1970 Age: 50 years Gender: Male Order Date:  2019 12:45 PM EXAM: CT CERVICAL SPINE WO CONTRAST NUMBER OF IMAGES:  732 INDICATION:  NECK PAIN FOLLOWING TRAUMA COMPARISON: None Multiple CT sections were obtained with pelvic fluid. The density of this has Hounsfield units measuring 61, and is most compatible with hemorrhage. There are some ill-defined subtle densities in the posterior aspect of the spleen. Findings are concerning for a subtle splenic laceration. No other visceral organ abnormality is identified.  CRITICAL FINDING: Results were called and read back to the emergency room on 5/31/2019 at 1430 hours    CBC:   Lab Results   Component Value Date    WBC 23.2 06/07/2019    RBC 2.32 06/07/2019    HGB 6.9 06/07/2019    HCT 21.2 06/07/2019    MCV 91.4 06/07/2019    MCH 29.7 06/07/2019    MCHC 32.5 06/07/2019    RDW 13.8 06/07/2019     06/07/2019    MPV 10.6 06/07/2019     CBC with Differential:    Lab Results   Component Value Date    WBC 23.2 06/07/2019    RBC 2.32 06/07/2019    HGB 6.9 06/07/2019    HCT 21.2 06/07/2019     06/07/2019    MCV 91.4 06/07/2019    MCH 29.7 06/07/2019    MCHC 32.5 06/07/2019    RDW 13.8 06/07/2019    LYMPHOPCT 31.5 01/05/2019    MONOPCT 7.3 01/05/2019    BASOPCT 0.8 01/05/2019    MONOSABS 0.66 01/05/2019    LYMPHSABS 2.85 01/05/2019    EOSABS 0.55 01/05/2019    BASOSABS 0.07 01/05/2019     BMP:    Lab Results   Component Value Date     06/06/2019    K 5.0 06/06/2019    K 4.5 06/05/2019     06/06/2019    CO2 25 06/06/2019    BUN 29 06/06/2019    LABALBU 3.0 06/03/2019    CREATININE 1.0 06/06/2019    CALCIUM 8.5 06/06/2019    GFRAA >60 06/06/2019    LABGLOM >60 06/06/2019    GLUCOSE 122 06/06/2019      bisacodyl  10 mg Rectal Daily    sodium chloride flush  10 mL Intravenous Once    lidocaine  1 patch Transdermal Daily    methocarbamol  1,500 mg Oral 4x Daily    enoxaparin  30 mg Subcutaneous BID    docusate sodium  100 mg Oral BID    senna  1 tablet Oral Nightly    sodium chloride flush  10 mL Intravenous 2 times per day    bacitracin zinc   Topical TID    acetaminophen  650 mg Oral Q4H    atorvastatin  40 mg Oral Nightly    nicotine  1 patch Transdermal Daily     Awake and alert, motor full  Assessment:  Patient Active Problem List   Diagnosis    Chest pain    Tobacco use    Atypical chest pain    Cocaine use    Nodule of left lung    Numbness and tingling of right lower extremity    Unstable angina (HCC)    Leukocytosis    Hep C w/o coma, chronic (HCC)    Hematoma of spleen, closed, initial encounter    Traumatic hemoperitoneum    Closed traumatic minimally displaced fracture of one rib of left side    Acute blood loss anemia    Laceration of spleen    Multiple closed fractures of ribs of left side     Plan:Continue current care  Lexi Newell M.D.

## 2019-06-07 NOTE — PROGRESS NOTES
Physical Therapy    Facility/Department: [facility] ORTHO-TRAUMA  Re-Evaluation    Name: Daisy Tavares  : 1970  MRN: [id number]    Date of Service: 2019    Re-Evaluating PT:  Kodak Torres, PT, DPT [id number]     ROOM #: [id number]  DIAGNOSIS: Hematoma of spleen  PRECAUTIONS: Falls, C-collar, c-spine precautions, abdominal precautions, L rib fx  PMHx: GERD  PROCEDURES:  emergent ex lap, spleenectomy     Social:  Pt lives with wife in a 2nd floor apartment with 4 step(s) and 1 rail(s) to enter. Once inside, >10 steps and 1 rail to apartment. Prior to admission pt walked with no device and was Independent. Pt works in construction.                Re- Evaluation  Date: 19 Treatment  Date:     Short Term/ Long Term   Goals   AM-PAC 6 Clicks 75/63       Does pt have pain? 7/10 abdomen       Bed Mobility  Rolling: SBA  Supine to sit: SBA  Sit to supine: NT  Scooting: SBA   Mod Independent   Transfers Sit to stand: SBA  Stand to sit: SBA  Stand pivot: Felix no device   Independent   Ambulation   65 feet with Felix with no device   >400 feet Independently   Stair negotiation: ascended and descended NT   >10 steps with 1 rail with Mod Independent   BLE ROM WNL       BLE strength Grossly 5/5       Balance Sitting: SBA  Standing: Felix no device   Sitting: Independent  Standing: Independent      Vitals:  Heart Rate at rest 105 bpm Heart Rate post session 108 bpm   SpO2 at rest 93% 2 L O2 SpO2 post session 93% 2 L O2         Pt is alert and oriented x 4  Sensation: WNL  Edema: WNL       Patient education  Pt educated on role of PT, spine and abdominal precautions    Patient response to education:   Pt verbalized understanding Pt demonstrated skill Pt requires further education in this area   yes partial yes     Comments:  Pt supine in bed upon arrival. Pt noted to be on RA, spo2 assessed to be 82%. Pt placed back on 2 L O2 via NC and educated on pursed lip breathing. Pt spo2 recovering to 92% in 30 seconds.  Pt

## 2019-06-07 NOTE — CARE COORDINATION
6/7/2019 social work transition of care  Sw spoke with peer recovery rep, they made an attempt to speak with pt. Pt was not interested in any services at this time, they will follow.   Electronically signed by CAROLINE Franocis on 6/7/2019 at 12:59 PM

## 2019-06-08 LAB
HCT VFR BLD CALC: 25.2 % (ref 37–54)
HEMOGLOBIN: 7.8 G/DL (ref 12.5–16.5)
MCH RBC QN AUTO: 29 PG (ref 26–35)
MCHC RBC AUTO-ENTMCNC: 31 % (ref 32–34.5)
MCV RBC AUTO: 93.7 FL (ref 80–99.9)
METER GLUCOSE: 106 MG/DL (ref 74–99)
PDW BLD-RTO: 14.1 FL (ref 11.5–15)
PLATELET # BLD: 383 E9/L (ref 130–450)
PMV BLD AUTO: 9.7 FL (ref 7–12)
RBC # BLD: 2.69 E12/L (ref 3.8–5.8)
WBC # BLD: 14.1 E9/L (ref 4.5–11.5)

## 2019-06-08 PROCEDURE — 6370000000 HC RX 637 (ALT 250 FOR IP): Performed by: STUDENT IN AN ORGANIZED HEALTH CARE EDUCATION/TRAINING PROGRAM

## 2019-06-08 PROCEDURE — 82962 GLUCOSE BLOOD TEST: CPT

## 2019-06-08 PROCEDURE — 2580000003 HC RX 258: Performed by: SURGERY

## 2019-06-08 PROCEDURE — 2580000003 HC RX 258: Performed by: STUDENT IN AN ORGANIZED HEALTH CARE EDUCATION/TRAINING PROGRAM

## 2019-06-08 PROCEDURE — 2700000000 HC OXYGEN THERAPY PER DAY

## 2019-06-08 PROCEDURE — 36415 COLL VENOUS BLD VENIPUNCTURE: CPT

## 2019-06-08 PROCEDURE — 1200000000 HC SEMI PRIVATE

## 2019-06-08 PROCEDURE — 6360000002 HC RX W HCPCS: Performed by: STUDENT IN AN ORGANIZED HEALTH CARE EDUCATION/TRAINING PROGRAM

## 2019-06-08 PROCEDURE — 85027 COMPLETE CBC AUTOMATED: CPT

## 2019-06-08 RX ORDER — KETOROLAC TROMETHAMINE 30 MG/ML
30 INJECTION, SOLUTION INTRAMUSCULAR; INTRAVENOUS EVERY 6 HOURS
Status: DISCONTINUED | OUTPATIENT
Start: 2019-06-08 | End: 2019-06-10

## 2019-06-08 RX ADMIN — OXYCODONE HYDROCHLORIDE 10 MG: 10 TABLET ORAL at 16:06

## 2019-06-08 RX ADMIN — METHOCARBAMOL TABLETS 1500 MG: 750 TABLET, COATED ORAL at 08:00

## 2019-06-08 RX ADMIN — ACETAMINOPHEN 650 MG: 325 TABLET, FILM COATED ORAL at 05:07

## 2019-06-08 RX ADMIN — OXYCODONE HYDROCHLORIDE 10 MG: 10 TABLET ORAL at 11:13

## 2019-06-08 RX ADMIN — Medication: at 20:04

## 2019-06-08 RX ADMIN — DOCUSATE SODIUM 100 MG: 100 CAPSULE, LIQUID FILLED ORAL at 20:03

## 2019-06-08 RX ADMIN — OXYCODONE HYDROCHLORIDE 10 MG: 10 TABLET ORAL at 05:07

## 2019-06-08 RX ADMIN — KETOROLAC TROMETHAMINE 30 MG: 30 INJECTION, SOLUTION INTRAMUSCULAR; INTRAVENOUS at 18:08

## 2019-06-08 RX ADMIN — SENNOSIDES 8.6 MG: 8.6 TABLET, FILM COATED ORAL at 20:03

## 2019-06-08 RX ADMIN — ENOXAPARIN SODIUM 30 MG: 30 INJECTION SUBCUTANEOUS at 20:03

## 2019-06-08 RX ADMIN — HYDROMORPHONE HYDROCHLORIDE 0.5 MG: 1 INJECTION, SOLUTION INTRAMUSCULAR; INTRAVENOUS; SUBCUTANEOUS at 09:03

## 2019-06-08 RX ADMIN — DOCUSATE SODIUM 100 MG: 100 CAPSULE, LIQUID FILLED ORAL at 08:01

## 2019-06-08 RX ADMIN — ONDANSETRON HYDROCHLORIDE 4 MG: 2 SOLUTION INTRAMUSCULAR; INTRAVENOUS at 13:02

## 2019-06-08 RX ADMIN — DEXTROSE AND SODIUM CHLORIDE: 5; 900 INJECTION, SOLUTION INTRAVENOUS at 01:03

## 2019-06-08 RX ADMIN — METHOCARBAMOL TABLETS 1500 MG: 750 TABLET, COATED ORAL at 16:06

## 2019-06-08 RX ADMIN — ACETAMINOPHEN 650 MG: 325 TABLET, FILM COATED ORAL at 11:13

## 2019-06-08 RX ADMIN — HYDROMORPHONE HYDROCHLORIDE 0.5 MG: 1 INJECTION, SOLUTION INTRAMUSCULAR; INTRAVENOUS; SUBCUTANEOUS at 02:45

## 2019-06-08 RX ADMIN — KETOROLAC TROMETHAMINE 30 MG: 30 INJECTION, SOLUTION INTRAMUSCULAR; INTRAVENOUS at 23:00

## 2019-06-08 RX ADMIN — ATORVASTATIN CALCIUM 40 MG: 40 TABLET, FILM COATED ORAL at 20:03

## 2019-06-08 RX ADMIN — OXYCODONE HYDROCHLORIDE 10 MG: 10 TABLET ORAL at 00:58

## 2019-06-08 RX ADMIN — HYDROMORPHONE HYDROCHLORIDE 0.5 MG: 1 INJECTION, SOLUTION INTRAMUSCULAR; INTRAVENOUS; SUBCUTANEOUS at 05:56

## 2019-06-08 RX ADMIN — Medication: at 08:00

## 2019-06-08 RX ADMIN — OXYCODONE HYDROCHLORIDE 10 MG: 10 TABLET ORAL at 20:05

## 2019-06-08 RX ADMIN — HYDROMORPHONE HYDROCHLORIDE 0.5 MG: 1 INJECTION, SOLUTION INTRAMUSCULAR; INTRAVENOUS; SUBCUTANEOUS at 12:54

## 2019-06-08 RX ADMIN — ACETAMINOPHEN 650 MG: 325 TABLET, FILM COATED ORAL at 00:59

## 2019-06-08 RX ADMIN — Medication 10 ML: at 08:00

## 2019-06-08 RX ADMIN — METHOCARBAMOL TABLETS 1500 MG: 750 TABLET, COATED ORAL at 20:03

## 2019-06-08 ASSESSMENT — PAIN DESCRIPTION - LOCATION
LOCATION: ABDOMEN

## 2019-06-08 ASSESSMENT — PAIN DESCRIPTION - ONSET
ONSET: ON-GOING

## 2019-06-08 ASSESSMENT — PAIN DESCRIPTION - FREQUENCY
FREQUENCY: CONTINUOUS

## 2019-06-08 ASSESSMENT — PAIN DESCRIPTION - DESCRIPTORS
DESCRIPTORS: ACHING;BURNING;CONSTANT
DESCRIPTORS: ACHING;BURNING;PRESSURE
DESCRIPTORS: ACHING;DISCOMFORT;SORE
DESCRIPTORS: ACHING;CONSTANT;DISCOMFORT
DESCRIPTORS: ACHING;DISCOMFORT;SHARP
DESCRIPTORS: ACHING;CONSTANT;DISCOMFORT
DESCRIPTORS: ACHING;DISCOMFORT;SORE
DESCRIPTORS: ACHING;CONSTANT;DISCOMFORT

## 2019-06-08 ASSESSMENT — PAIN SCALES - GENERAL
PAINLEVEL_OUTOF10: 9
PAINLEVEL_OUTOF10: 5
PAINLEVEL_OUTOF10: 4
PAINLEVEL_OUTOF10: 8
PAINLEVEL_OUTOF10: 7
PAINLEVEL_OUTOF10: 8
PAINLEVEL_OUTOF10: 7
PAINLEVEL_OUTOF10: 5
PAINLEVEL_OUTOF10: 4
PAINLEVEL_OUTOF10: 9
PAINLEVEL_OUTOF10: 0
PAINLEVEL_OUTOF10: 0
PAINLEVEL_OUTOF10: 9
PAINLEVEL_OUTOF10: 5
PAINLEVEL_OUTOF10: 9
PAINLEVEL_OUTOF10: 8
PAINLEVEL_OUTOF10: 0
PAINLEVEL_OUTOF10: 5
PAINLEVEL_OUTOF10: 7

## 2019-06-08 ASSESSMENT — PAIN DESCRIPTION - ORIENTATION
ORIENTATION: MID
ORIENTATION: LEFT
ORIENTATION: MID
ORIENTATION: MID
ORIENTATION: LEFT
ORIENTATION: MID

## 2019-06-08 ASSESSMENT — PAIN DESCRIPTION - PAIN TYPE
TYPE: ACUTE PAIN
TYPE: SURGICAL PAIN
TYPE: ACUTE PAIN
TYPE: ACUTE PAIN
TYPE: ACUTE PAIN;SURGICAL PAIN
TYPE: SURGICAL PAIN
TYPE: ACUTE PAIN
TYPE: ACUTE PAIN;SURGICAL PAIN
TYPE: SURGICAL PAIN
TYPE: ACUTE PAIN;SURGICAL PAIN
TYPE: ACUTE PAIN;SURGICAL PAIN
TYPE: SURGICAL PAIN
TYPE: ACUTE PAIN;SURGICAL PAIN

## 2019-06-08 ASSESSMENT — PAIN DESCRIPTION - PROGRESSION
CLINICAL_PROGRESSION: NOT CHANGED
CLINICAL_PROGRESSION: NOT CHANGED
CLINICAL_PROGRESSION: GRADUALLY IMPROVING

## 2019-06-08 NOTE — FLOWSHEET NOTE
Pt removed collar while laying in bed. Explained the risks of not wearing collar continuously.  Ruben Elena  6/8/2019  9:41 AM

## 2019-06-08 NOTE — FLOWSHEET NOTE
Nurse was called into room by family and was having another starring episode. Pt vitals stable, family wants to talk to doctor.  Resident on call notified again of family request. Ousmane Duffy

## 2019-06-08 NOTE — PROGRESS NOTES
[facility]  ATTENDING PHYSICIAN PROGRESS NOTE     I have examined the patient and  reviewed the record. I have reviewed all relevant labs and imaging data. The following summarizes my clinical findings and independent assessment. CC: c spine ligamentous injury and grade IV ruptured spleen    S. Received 1 unit PRBC    O.  Vitals:    06/08/19 0435   BP: 125/68   Pulse: 91   Resp: 16   Temp: 99.8 °F (37.7 °C)   SpO2:      NAD  Custom collar present  Awake and alert x 3  s1s2  Abdomen soft approp tender, inc c/d/i    ASSESSMENT:  Active Problems:    Hematoma of spleen, closed, initial encounter    Traumatic hemoperitoneum    Closed traumatic minimally displaced fracture of one rib of left side    Acute blood loss anemia    Laceration of spleen    Multiple closed fractures of ribs of left side  Resolved Problems:    * No resolved hospital problems.  *       PLAN:  POD 3 s/p splenectomy  Npo  Await bowel function  Cspine ligamentous injury--continue custom collar  Continue to ambulate    Acute blood loss anemia from ruptured spleen--received 1 unit prbc yesterday    DVT Proph: SCDS/ lovenox       Carlos Chavez MD, FACS  6/8/2019  8:18 AM

## 2019-06-08 NOTE — FLOWSHEET NOTE
Pt family called this nurse in the room that patient is acting weird like he did yesterday. Pt awake I called patient he looked at me. I tried to asked questions but patient just had a blank stare and eyes were tearing up. Family said he had this yesterday. They are concerned. Vitals takes and are stable. Another nurse came in to exam patient, he was able to answer more questions appropriately. Patient reports  hearing us, but could not answer.  [facility] notified Ana Rosa Honorio  6/8/2019  12:01 PM

## 2019-06-08 NOTE — PROGRESS NOTES
Neurosurg progress note  VITALS:  BP (!) 133/58   Pulse 94   Temp 98.5 °F (36.9 °C) (Temporal)   Resp 18   Ht 6' (1.829 m)   Wt 182 lb (82.6 kg) Comment: Bedscale  SpO2 95%   BMI 24.68 kg/m²   24HR INTAKE/OUTPUT:    Intake/Output Summary (Last 24 hours) at 2019 1102  Last data filed at 2019 0532  Gross per 24 hour   Intake 1055 ml   Output 700 ml   Net 355 ml     Xr Chest Standard (2 Vw)    Result Date: 2019  Patient MRN:  [id number] : 1970 Age: 50 years Gender: Male Order Date:  2019 12:45 PM EXAM: XR CHEST (2 VW) 3 images INDICATION:  dyspnea, fall dyspnea, fall COMPARISON: 2019 FINDINGS: The heart and the mediastinal structures are normal. There is mild COPD. There is minimal infiltrate/atelectasis in the right lung base. There is no pneumothorax. There is gastric distention. COPD with minimal atelectasis/infiltrates in the right lung base. Ct Head Wo Contrast    Result Date: 2019  Patient MRN:  [id number] : 1970 Age: 50 years Gender: Male Order Date:  2019 12:45 PM EXAM: CT HEAD WO CONTRAST NUMBER OF IMAGES:  161 INDICATION:  HEAD TRAUMA, CLOSED, MILD, GCS >= 13, NO RISK FACTORS, NEURO EXAM NORMAL COMPARISON: None Technique: Low-dose CT  acquisition technique included one of following options; 1 . Automated exposure control, 2. Adjustment of MA and or KV according to patient's size or 3. Use of iterative reconstruction. Multiple CT sections were obtained with sagittal and coronal MPR reconstructions. The ventricles are unremarkable. The gyri and sulci appear unremarkable. The white matter appears unremarkable. There is no evidence for hemorrhage. There is no infarct identified. There is no mass effect identified. There is no mass identified. Unremarkable scan of the head.      Ct Chest W Contrast    Result Date: 2019  Patient MRN:  [id number] : 1970 Age: 50 years Gender: Male Order Date:  2019 12:45 PM EXAM: CT CHEST W CONTRAST, CT ABDOMEN PELVIS W IV CONTRAST Dosage: 1892 mGY-cm Contrast: 110 mL Isovue-370 vertebral body height in the thoracic spine is normal. INDICATION:  left chest pain, fell 12 feet  COMPARISON: None FINDINGS:  Vertebral body height in the thoracic spine is normal. There is a subcutaneous nodule medially in the right hemithorax. Thoracic aorta is normal. Heart size is normal. There is no hilar or mediastinal lymph nodes. There are small left axillary nodes. There is borderline hyperinflation in the upper lungs. There is minimal scarring in the right middle lobe and left lung base. No obvious thoracic fractures are noted. There is a moderate amount of upper abdominal ascites/fluid. Liver,pancreas, and kidneys are normal aside from an upper pole right renal cyst. There is a small lower pole left renal cyst. Spleen has a couple small low-attenuation zones on its periphery (series 503 image 53). Considering the amount pelvic fluid/hemorrhage findings are suspicious of a subtle splenic laceration. There is a significant amount of pelvic ascites. The density measures 61 Hounsfield. Bladder appears normal. No pelvic fractures are identified. Small to moderate amount of upper abdominal ascites/fluid and significant amount of pelvic fluid. The density of this has Hounsfield units measuring 61, and is most compatible with hemorrhage. There are some ill-defined subtle densities in the posterior aspect of the spleen. Findings are concerning for a subtle splenic laceration. No other visceral organ abnormality is identified.  CRITICAL FINDING: Results were called and read back to the emergency room on 2019 at 1430 hours    Ct Cervical Spine Wo Contrast    Result Date: 2019  Patient MRN:  [id number] : 1970 Age: 50 years Gender: Male Order Date:  2019 12:45 PM EXAM: CT CERVICAL SPINE WO CONTRAST NUMBER OF IMAGES:  732 INDICATION:  NECK PAIN FOLLOWING TRAUMA COMPARISON: None Multiple CT sections were obtained with

## 2019-06-08 NOTE — PLAN OF CARE
Eulene Butter up to ambul x 2 this shift. Initially refusing but did well, appeared to have improved confidence. States he felt better after walk  ( burping, improved mobility of resp secretions- strong cough, occas productive ). Wife gave him good bed bath in the afternoon. Suppos w/o bm effect yet. semi solid fecal parts palpable in lower rectal vault. Eulene Butter speaks of underst tender abd / difficu bearing down ( valsava ) just yet. One u PRC completed.   vss

## 2019-06-09 ENCOUNTER — APPOINTMENT (OUTPATIENT)
Dept: GENERAL RADIOLOGY | Age: 49
DRG: 650 | End: 2019-06-09
Payer: COMMERCIAL

## 2019-06-09 LAB
B.E.: -2.4 MMOL/L (ref -3–3)
COHB: 1.5 % (ref 0–1.5)
CRITICAL: ABNORMAL
DATE ANALYZED: ABNORMAL
DATE OF COLLECTION: ABNORMAL
HCO3: 20.9 MMOL/L (ref 22–26)
HCT VFR BLD CALC: 23.3 % (ref 37–54)
HEMOGLOBIN: 7.4 G/DL (ref 12.5–16.5)
HHB: 7.8 % (ref 0–5)
LAB: ABNORMAL
Lab: ABNORMAL
MCH RBC QN AUTO: 28.8 PG (ref 26–35)
MCHC RBC AUTO-ENTMCNC: 31.8 % (ref 32–34.5)
MCV RBC AUTO: 90.7 FL (ref 80–99.9)
METHB: 0.3 % (ref 0–1.5)
MODE: ABNORMAL
O2 SATURATION: 92.1 % (ref 92–98.5)
O2HB: 90.4 % (ref 94–97)
OPERATOR ID: 2060
PATIENT TEMP: 37 C
PCO2: 30.3 MMHG (ref 35–45)
PDW BLD-RTO: 13.8 FL (ref 11.5–15)
PH BLOOD GAS: 7.46 (ref 7.35–7.45)
PLATELET # BLD: 475 E9/L (ref 130–450)
PMV BLD AUTO: 9.3 FL (ref 7–12)
PO2: 62.2 MMHG (ref 60–100)
RBC # BLD: 2.57 E12/L (ref 3.8–5.8)
SOURCE, BLOOD GAS: ABNORMAL
THB: 9.2 G/DL (ref 11.5–16.5)
TIME ANALYZED: 1234
WBC # BLD: 16.3 E9/L (ref 4.5–11.5)

## 2019-06-09 PROCEDURE — 36600 WITHDRAWAL OF ARTERIAL BLOOD: CPT

## 2019-06-09 PROCEDURE — 71045 X-RAY EXAM CHEST 1 VIEW: CPT

## 2019-06-09 PROCEDURE — 99232 SBSQ HOSP IP/OBS MODERATE 35: CPT | Performed by: SURGERY

## 2019-06-09 PROCEDURE — 85027 COMPLETE CBC AUTOMATED: CPT

## 2019-06-09 PROCEDURE — 6360000002 HC RX W HCPCS: Performed by: STUDENT IN AN ORGANIZED HEALTH CARE EDUCATION/TRAINING PROGRAM

## 2019-06-09 PROCEDURE — 36415 COLL VENOUS BLD VENIPUNCTURE: CPT

## 2019-06-09 PROCEDURE — 6370000000 HC RX 637 (ALT 250 FOR IP): Performed by: STUDENT IN AN ORGANIZED HEALTH CARE EDUCATION/TRAINING PROGRAM

## 2019-06-09 PROCEDURE — 2700000000 HC OXYGEN THERAPY PER DAY

## 2019-06-09 PROCEDURE — 2580000003 HC RX 258: Performed by: STUDENT IN AN ORGANIZED HEALTH CARE EDUCATION/TRAINING PROGRAM

## 2019-06-09 PROCEDURE — 82805 BLOOD GASES W/O2 SATURATION: CPT

## 2019-06-09 PROCEDURE — 1200000000 HC SEMI PRIVATE

## 2019-06-09 PROCEDURE — 94664 DEMO&/EVAL PT USE INHALER: CPT

## 2019-06-09 PROCEDURE — 97530 THERAPEUTIC ACTIVITIES: CPT

## 2019-06-09 PROCEDURE — 94640 AIRWAY INHALATION TREATMENT: CPT

## 2019-06-09 RX ORDER — IPRATROPIUM BROMIDE AND ALBUTEROL SULFATE 2.5; .5 MG/3ML; MG/3ML
1 SOLUTION RESPIRATORY (INHALATION)
Status: DISCONTINUED | OUTPATIENT
Start: 2019-06-09 | End: 2019-06-10

## 2019-06-09 RX ORDER — FUROSEMIDE 10 MG/ML
10 INJECTION INTRAMUSCULAR; INTRAVENOUS ONCE
Status: COMPLETED | OUTPATIENT
Start: 2019-06-09 | End: 2019-06-09

## 2019-06-09 RX ADMIN — ACETAMINOPHEN 650 MG: 325 TABLET, FILM COATED ORAL at 18:05

## 2019-06-09 RX ADMIN — Medication 10 ML: at 20:43

## 2019-06-09 RX ADMIN — DOCUSATE SODIUM 100 MG: 100 CAPSULE, LIQUID FILLED ORAL at 20:42

## 2019-06-09 RX ADMIN — OXYCODONE HYDROCHLORIDE 5 MG: 5 TABLET ORAL at 04:17

## 2019-06-09 RX ADMIN — KETOROLAC TROMETHAMINE 30 MG: 30 INJECTION, SOLUTION INTRAMUSCULAR; INTRAVENOUS at 05:16

## 2019-06-09 RX ADMIN — OXYCODONE HYDROCHLORIDE 10 MG: 10 TABLET ORAL at 08:22

## 2019-06-09 RX ADMIN — ACETAMINOPHEN 650 MG: 325 TABLET, FILM COATED ORAL at 14:47

## 2019-06-09 RX ADMIN — OXYCODONE HYDROCHLORIDE 10 MG: 10 TABLET ORAL at 16:39

## 2019-06-09 RX ADMIN — DOCUSATE SODIUM 100 MG: 100 CAPSULE, LIQUID FILLED ORAL at 08:08

## 2019-06-09 RX ADMIN — IPRATROPIUM BROMIDE AND ALBUTEROL SULFATE 1 AMPULE: .5; 3 SOLUTION RESPIRATORY (INHALATION) at 16:57

## 2019-06-09 RX ADMIN — OXYCODONE HYDROCHLORIDE 10 MG: 10 TABLET ORAL at 00:05

## 2019-06-09 RX ADMIN — FUROSEMIDE 10 MG: 10 INJECTION, SOLUTION INTRAMUSCULAR; INTRAVENOUS at 14:47

## 2019-06-09 RX ADMIN — METHOCARBAMOL TABLETS 1500 MG: 750 TABLET, COATED ORAL at 16:39

## 2019-06-09 RX ADMIN — METHOCARBAMOL TABLETS 1500 MG: 750 TABLET, COATED ORAL at 20:43

## 2019-06-09 RX ADMIN — KETOROLAC TROMETHAMINE 30 MG: 30 INJECTION, SOLUTION INTRAMUSCULAR; INTRAVENOUS at 18:05

## 2019-06-09 RX ADMIN — KETOROLAC TROMETHAMINE 30 MG: 30 INJECTION, SOLUTION INTRAMUSCULAR; INTRAVENOUS at 23:01

## 2019-06-09 RX ADMIN — ATORVASTATIN CALCIUM 40 MG: 40 TABLET, FILM COATED ORAL at 20:42

## 2019-06-09 RX ADMIN — METHOCARBAMOL TABLETS 1500 MG: 750 TABLET, COATED ORAL at 14:48

## 2019-06-09 RX ADMIN — IPRATROPIUM BROMIDE AND ALBUTEROL SULFATE 1 AMPULE: .5; 3 SOLUTION RESPIRATORY (INHALATION) at 21:12

## 2019-06-09 RX ADMIN — METHOCARBAMOL TABLETS 1500 MG: 750 TABLET, COATED ORAL at 08:08

## 2019-06-09 RX ADMIN — ACETAMINOPHEN 650 MG: 325 TABLET, FILM COATED ORAL at 23:00

## 2019-06-09 RX ADMIN — SENNOSIDES 8.6 MG: 8.6 TABLET, FILM COATED ORAL at 20:42

## 2019-06-09 RX ADMIN — OXYCODONE HYDROCHLORIDE 10 MG: 10 TABLET ORAL at 12:28

## 2019-06-09 RX ADMIN — KETOROLAC TROMETHAMINE 30 MG: 30 INJECTION, SOLUTION INTRAMUSCULAR; INTRAVENOUS at 11:57

## 2019-06-09 RX ADMIN — OXYCODONE HYDROCHLORIDE 10 MG: 10 TABLET ORAL at 20:42

## 2019-06-09 ASSESSMENT — PAIN DESCRIPTION - ORIENTATION
ORIENTATION: MID

## 2019-06-09 ASSESSMENT — PAIN DESCRIPTION - LOCATION
LOCATION: ABDOMEN
LOCATION: RIB CAGE
LOCATION: ABDOMEN
LOCATION: ABDOMEN
LOCATION: RIB CAGE
LOCATION: ABDOMEN
LOCATION: RIB CAGE
LOCATION: ABDOMEN

## 2019-06-09 ASSESSMENT — PAIN DESCRIPTION - PROGRESSION
CLINICAL_PROGRESSION: GRADUALLY IMPROVING
CLINICAL_PROGRESSION: GRADUALLY IMPROVING
CLINICAL_PROGRESSION: NOT CHANGED
CLINICAL_PROGRESSION: RAPIDLY WORSENING
CLINICAL_PROGRESSION: NOT CHANGED

## 2019-06-09 ASSESSMENT — PAIN DESCRIPTION - DESCRIPTORS
DESCRIPTORS: ACHING;CONSTANT;DISCOMFORT
DESCRIPTORS: ACHING;BURNING;CRAMPING
DESCRIPTORS: ACHING;CONSTANT;DISCOMFORT
DESCRIPTORS: ACHING;BURNING;CONSTANT
DESCRIPTORS: ACHING;CONSTANT;DISCOMFORT
DESCRIPTORS: ACHING;CONSTANT;DISCOMFORT
DESCRIPTORS: ACHING;CONSTANT;CRAMPING
DESCRIPTORS: ACHING;CONSTANT;DISCOMFORT
DESCRIPTORS: ACHING;BURNING;CRAMPING
DESCRIPTORS: ACHING;CONSTANT;DISCOMFORT
DESCRIPTORS: ACHING;CONSTANT;BURNING
DESCRIPTORS: ACHING;CONSTANT;DISCOMFORT
DESCRIPTORS: ACHING;BURNING;CONSTANT

## 2019-06-09 ASSESSMENT — PAIN DESCRIPTION - FREQUENCY
FREQUENCY: CONTINUOUS

## 2019-06-09 ASSESSMENT — PAIN DESCRIPTION - ONSET
ONSET: ON-GOING

## 2019-06-09 ASSESSMENT — PAIN DESCRIPTION - PAIN TYPE
TYPE: ACUTE PAIN;SURGICAL PAIN
TYPE: SURGICAL PAIN
TYPE: ACUTE PAIN;SURGICAL PAIN
TYPE: ACUTE PAIN;SURGICAL PAIN
TYPE: SURGICAL PAIN
TYPE: ACUTE PAIN;SURGICAL PAIN
TYPE: ACUTE PAIN
TYPE: SURGICAL PAIN
TYPE: SURGICAL PAIN
TYPE: ACUTE PAIN;SURGICAL PAIN
TYPE: ACUTE PAIN;SURGICAL PAIN
TYPE: SURGICAL PAIN

## 2019-06-09 ASSESSMENT — PAIN SCALES - GENERAL
PAINLEVEL_OUTOF10: 9
PAINLEVEL_OUTOF10: 7
PAINLEVEL_OUTOF10: 6
PAINLEVEL_OUTOF10: 4
PAINLEVEL_OUTOF10: 3
PAINLEVEL_OUTOF10: 8
PAINLEVEL_OUTOF10: 5
PAINLEVEL_OUTOF10: 7
PAINLEVEL_OUTOF10: 0
PAINLEVEL_OUTOF10: 4
PAINLEVEL_OUTOF10: 4
PAINLEVEL_OUTOF10: 7
PAINLEVEL_OUTOF10: 6
PAINLEVEL_OUTOF10: 3
PAINLEVEL_OUTOF10: 6
PAINLEVEL_OUTOF10: 0
PAINLEVEL_OUTOF10: 9
PAINLEVEL_OUTOF10: 10
PAINLEVEL_OUTOF10: 9
PAINLEVEL_OUTOF10: 6
PAINLEVEL_OUTOF10: 0

## 2019-06-09 NOTE — PROGRESS NOTES
Neurosurg progress note  VITALS:  /61   Pulse 93   Temp 99.2 °F (37.3 °C) (Temporal)   Resp 18   Ht 6' (1.829 m)   Wt 182 lb (82.6 kg) Comment: Bedscale  SpO2 (!) 89%   BMI 24.68 kg/m²   24HR INTAKE/OUTPUT:    Intake/Output Summary (Last 24 hours) at 2019 1248  Last data filed at 2019 1016  Gross per 24 hour   Intake 1723 ml   Output 0 ml   Net 1723 ml     Xr Chest Standard (2 Vw)    Result Date: 2019  Patient MRN:  [id number] : 1970 Age: 50 years Gender: Male Order Date:  2019 12:45 PM EXAM: XR CHEST (2 VW) 3 images INDICATION:  dyspnea, fall dyspnea, fall COMPARISON: 2019 FINDINGS: The heart and the mediastinal structures are normal. There is mild COPD. There is minimal infiltrate/atelectasis in the right lung base. There is no pneumothorax. There is gastric distention. COPD with minimal atelectasis/infiltrates in the right lung base. Ct Head Wo Contrast    Result Date: 2019  Patient MRN:  [id number] : 1970 Age: 50 years Gender: Male Order Date:  2019 12:45 PM EXAM: CT HEAD WO CONTRAST NUMBER OF IMAGES:  161 INDICATION:  HEAD TRAUMA, CLOSED, MILD, GCS >= 13, NO RISK FACTORS, NEURO EXAM NORMAL COMPARISON: None Technique: Low-dose CT  acquisition technique included one of following options; 1 . Automated exposure control, 2. Adjustment of MA and or KV according to patient's size or 3. Use of iterative reconstruction. Multiple CT sections were obtained with sagittal and coronal MPR reconstructions. The ventricles are unremarkable. The gyri and sulci appear unremarkable. The white matter appears unremarkable. There is no evidence for hemorrhage. There is no infarct identified. There is no mass effect identified. There is no mass identified. Unremarkable scan of the head.      Ct Chest W Contrast    Result Date: 2019  Patient MRN:  [id number] : 1970 Age: 50 years Gender: Male Order Date:  2019 12:45 PM EXAM: CT CHEST W CONTRAST, CT ABDOMEN PELVIS W IV CONTRAST Dosage: 9580 mGY-cm Contrast: 110 mL Isovue-370 vertebral body height in the thoracic spine is normal. INDICATION:  left chest pain, fell 12 feet  COMPARISON: None FINDINGS:  Vertebral body height in the thoracic spine is normal. There is a subcutaneous nodule medially in the right hemithorax. Thoracic aorta is normal. Heart size is normal. There is no hilar or mediastinal lymph nodes. There are small left axillary nodes. There is borderline hyperinflation in the upper lungs. There is minimal scarring in the right middle lobe and left lung base. No obvious thoracic fractures are noted. There is a moderate amount of upper abdominal ascites/fluid. Liver,pancreas, and kidneys are normal aside from an upper pole right renal cyst. There is a small lower pole left renal cyst. Spleen has a couple small low-attenuation zones on its periphery (series 503 image 53). Considering the amount pelvic fluid/hemorrhage findings are suspicious of a subtle splenic laceration. There is a significant amount of pelvic ascites. The density measures 61 Hounsfield. Bladder appears normal. No pelvic fractures are identified. Small to moderate amount of upper abdominal ascites/fluid and significant amount of pelvic fluid. The density of this has Hounsfield units measuring 61, and is most compatible with hemorrhage. There are some ill-defined subtle densities in the posterior aspect of the spleen. Findings are concerning for a subtle splenic laceration. No other visceral organ abnormality is identified.  CRITICAL FINDING: Results were called and read back to the emergency room on 2019 at 1430 hours    Ct Cervical Spine Wo Contrast    Result Date: 2019  Patient MRN:  [id number] : 1970 Age: 50 years Gender: Male Order Date:  2019 12:45 PM EXAM: CT CERVICAL SPINE WO CONTRAST NUMBER OF IMAGES:  732 INDICATION:  NECK PAIN FOLLOWING TRAUMA COMPARISON: None Multiple CT sections were obtained with sagittal and coronal MPR reconstructions. Technique: Low-dose CT  acquisition technique included one of following options; 1 . Automated exposure control, 2. Adjustment of MA and or KV according to patient's size or 3. Use of iterative reconstruction. Images reveal the vertebral bodies, their disc spaces, and the posterior facet joints to appear to be intact. There are severe degenerative changes of the spine present. There are severe degenerative changes of the facets. .    Degenerative changes    Ct Abdomen Pelvis W Iv Contrast Additional Contrast? None    Result Date: 2019  Patient MRN:  [id number] : 1970 Age: 50 years Gender: Male Order Date:  2019 12:45 PM EXAM: CT CHEST W CONTRAST, CT ABDOMEN PELVIS W IV CONTRAST Dosage: 1184 mGY-cm Contrast: 110 mL Isovue-370 vertebral body height in the thoracic spine is normal. INDICATION:  left chest pain, fell 12 feet  COMPARISON: None FINDINGS:  Vertebral body height in the thoracic spine is normal. There is a subcutaneous nodule medially in the right hemithorax. Thoracic aorta is normal. Heart size is normal. There is no hilar or mediastinal lymph nodes. There are small left axillary nodes. There is borderline hyperinflation in the upper lungs. There is minimal scarring in the right middle lobe and left lung base. No obvious thoracic fractures are noted. There is a moderate amount of upper abdominal ascites/fluid. Liver,pancreas, and kidneys are normal aside from an upper pole right renal cyst. There is a small lower pole left renal cyst. Spleen has a couple small low-attenuation zones on its periphery (series 503 image 53). Considering the amount pelvic fluid/hemorrhage findings are suspicious of a subtle splenic laceration. There is a significant amount of pelvic ascites. The density measures 61 Hounsfield. Bladder appears normal. No pelvic fractures are identified.      Small to moderate amount of upper abdominal ascites/fluid and significant amount of pelvic fluid. The density of this has Hounsfield units measuring 61, and is most compatible with hemorrhage. There are some ill-defined subtle densities in the posterior aspect of the spleen. Findings are concerning for a subtle splenic laceration. No other visceral organ abnormality is identified.  CRITICAL FINDING: Results were called and read back to the emergency room on 5/31/2019 at 1430 hours    CBC:   Lab Results   Component Value Date    WBC 16.3 06/09/2019    RBC 2.57 06/09/2019    HGB 7.4 06/09/2019    HCT 23.3 06/09/2019    MCV 90.7 06/09/2019    MCH 28.8 06/09/2019    MCHC 31.8 06/09/2019    RDW 13.8 06/09/2019     06/09/2019    MPV 9.3 06/09/2019     BMP:    Lab Results   Component Value Date     06/06/2019    K 5.0 06/06/2019    K 4.5 06/05/2019     06/06/2019    CO2 25 06/06/2019    BUN 29 06/06/2019    LABALBU 3.0 06/03/2019    CREATININE 1.0 06/06/2019    CALCIUM 8.5 06/06/2019    GFRAA >60 06/06/2019    LABGLOM >60 06/06/2019    GLUCOSE 122 06/06/2019      furosemide  10 mg Intravenous Once    ipratropium-albuterol  1 ampule Inhalation Q4H WA    ketorolac  30 mg Intravenous Q6H    bisacodyl  10 mg Rectal Daily    sodium chloride flush  10 mL Intravenous Once    lidocaine  1 patch Transdermal Daily    methocarbamol  1,500 mg Oral 4x Daily    enoxaparin  30 mg Subcutaneous BID    docusate sodium  100 mg Oral BID    senna  1 tablet Oral Nightly    sodium chloride flush  10 mL Intravenous 2 times per day    bacitracin zinc   Topical TID    acetaminophen  650 mg Oral Q4H    atorvastatin  40 mg Oral Nightly    nicotine  1 patch Transdermal Daily     Remains awake and alert, collar in place perrl eomi   Assessment:  Patient Active Problem List   Diagnosis    Chest pain    Tobacco use    Atypical chest pain    Cocaine use    Nodule of left lung    Numbness and tingling of right lower extremity    Unstable angina (HCC)    Leukocytosis    Hep C w/o coma, chronic ([facility] [address]    Hematoma of spleen, closed, initial encounter    Traumatic hemoperitoneum    Closed traumatic minimally displaced fracture of one rib of left side    Acute blood loss anemia    Laceration of spleen    Multiple closed fractures of ribs of left side     Plan:Continue current care  Amy Ridjanna FLOYD

## 2019-06-09 NOTE — PROGRESS NOTES
throughout session. Pt performed supine to sit transfer with HOB elevated; pt was cued for abdominal bracing. Pt's O2 sat dropped to 83% on 4 L O2/min and only recovered to 87% with pursed lip breathing and seated rest. O2 was increased to 6 L O2/min and saturation levels recovered to 92%. RN was present and requested light activity due to desaturation with activity and SOB. Pt performed sit to stand transfer and was cued to remain standing at EOB. O2 sat dropped to 85% on 6 L O2/min and did not recover with standing rest. RN requested pt be seated. O2 sat recovered to 92% on 6 L O2/min with seated rest. Pt was left seated with RN present at conclusion of session. Increased time required for PT treatment for monitoring of pt symptoms and communication with nursing staff. Patient is making fair progress toward established physical therapy goals. Continue with physical therapy current plan of care.     Time in: 1130  Time out: [address], [address] [place], [place]  [id number]

## 2019-06-09 NOTE — FLOWSHEET NOTE
PT in room, called this writer that patient is SOB. He was 87 on 4 liters. Therapy placed patient on 6 liters and patient only came up to 88/89 6 liters. Pt wanted to try and stand and when he did pulse ox dropped 83 on 6 liters and pulse was 135. Pt became  increasing SOB and was assisted to sit on the edge of the bed where he feels he can breathe better. [facility] notified.  Rod Lara  6/9/2019  11:51 AM

## 2019-06-09 NOTE — PROGRESS NOTES
[facility]  ATTENDING PHYSICIAN PROGRESS NOTE     I have examined the patient and  reviewed the record. I have reviewed all relevant labs and imaging data. The following summarizes my clinical findings and independent assessment. CC: c spine ligamentous injury and grade IV ruptured spleen    S. Pt is passing gas    O.  Vitals:    06/09/19 1145   BP:    Pulse:    Resp:    Temp:    SpO2: (!) 89%     NAD  Custom collar present  Awake and alert x 3  s1s2  Abdomen soft approp tender, inc c/d/i    ASSESSMENT:  Active Problems:    Hematoma of spleen, closed, initial encounter    Traumatic hemoperitoneum    Closed traumatic minimally displaced fracture of one rib of left side    Acute blood loss anemia    Laceration of spleen    Multiple closed fractures of ribs of left side  Resolved Problems:    * No resolved hospital problems.  *       PLAN:  POD4 s/p splenectomy  Start clears  Stop IVF     Cspine ligamentous injury--continue custom collar at all times  Continue to ambulate    Acute blood loss anemia from ruptured spleen--monitor cbc    DVT Proph: RAHUL/ david Godinez MD, FACS  6/9/2019  11:48 AM

## 2019-06-10 ENCOUNTER — APPOINTMENT (OUTPATIENT)
Dept: CT IMAGING | Age: 49
DRG: 650 | End: 2019-06-10
Payer: COMMERCIAL

## 2019-06-10 LAB
ANION GAP SERPL CALCULATED.3IONS-SCNC: 12 MMOL/L (ref 7–16)
B.E.: -2.7 MMOL/L (ref -3–3)
BACTERIA: NORMAL /HPF
BILIRUBIN URINE: NEGATIVE
BLOOD, URINE: NEGATIVE
BUN BLDV-MCNC: 18 MG/DL (ref 6–20)
CALCIUM SERPL-MCNC: 8.1 MG/DL (ref 8.6–10.2)
CHLORIDE BLD-SCNC: 101 MMOL/L (ref 98–107)
CK MB: 1.5 NG/ML (ref 0–7.7)
CLARITY: CLEAR
CO2: 23 MMOL/L (ref 22–29)
COHB: 1.1 % (ref 0–1.5)
COLOR: YELLOW
CREAT SERPL-MCNC: 0.9 MG/DL (ref 0.7–1.2)
CRITICAL: ABNORMAL
DATE ANALYZED: ABNORMAL
DATE OF COLLECTION: ABNORMAL
EKG ATRIAL RATE: 101 BPM
EKG P AXIS: 46 DEGREES
EKG P-R INTERVAL: 134 MS
EKG Q-T INTERVAL: 346 MS
EKG QRS DURATION: 96 MS
EKG QTC CALCULATION (BAZETT): 448 MS
EKG R AXIS: 37 DEGREES
EKG T AXIS: 28 DEGREES
EKG VENTRICULAR RATE: 101 BPM
GFR AFRICAN AMERICAN: >60
GFR NON-AFRICAN AMERICAN: >60 ML/MIN/1.73
GLUCOSE BLD-MCNC: 100 MG/DL (ref 74–99)
GLUCOSE URINE: NEGATIVE MG/DL
HCO3: 20.8 MMOL/L (ref 22–26)
HCT VFR BLD CALC: 22.1 % (ref 37–54)
HEMOGLOBIN: 7.1 G/DL (ref 12.5–16.5)
HHB: 8.5 % (ref 0–5)
KETONES, URINE: NEGATIVE MG/DL
L. PNEUMOPHILA SEROGP 1 UR AG: NORMAL
LAB: ABNORMAL
LEUKOCYTE ESTERASE, URINE: NEGATIVE
Lab: ABNORMAL
MAGNESIUM: 2.1 MG/DL (ref 1.6–2.6)
MCH RBC QN AUTO: 28.5 PG (ref 26–35)
MCHC RBC AUTO-ENTMCNC: 32.1 % (ref 32–34.5)
MCV RBC AUTO: 88.8 FL (ref 80–99.9)
METHB: 0.1 % (ref 0–1.5)
MODE: ABNORMAL
NITRITE, URINE: NEGATIVE
O2 SATURATION: 91.4 % (ref 92–98.5)
O2HB: 90.3 % (ref 94–97)
OPERATOR ID: 2577
PATIENT TEMP: 37 C
PCO2: 30.5 MMHG (ref 35–45)
PDW BLD-RTO: 14.1 FL (ref 11.5–15)
PH BLOOD GAS: 7.45 (ref 7.35–7.45)
PH UA: 5.5 (ref 5–9)
PHOSPHORUS: 3 MG/DL (ref 2.5–4.5)
PLATELET # BLD: 564 E9/L (ref 130–450)
PMV BLD AUTO: 9.4 FL (ref 7–12)
PO2: 63.7 MMHG (ref 60–100)
POTASSIUM SERPL-SCNC: 3.6 MMOL/L (ref 3.5–5)
PROTEIN UA: 30 MG/DL
RBC # BLD: 2.49 E12/L (ref 3.8–5.8)
RBC UA: NORMAL /HPF (ref 0–2)
RENAL EPITHELIAL, UA: NORMAL /HPF
SODIUM BLD-SCNC: 136 MMOL/L (ref 132–146)
SOURCE, BLOOD GAS: ABNORMAL
SPECIFIC GRAVITY UA: 1.01 (ref 1–1.03)
STREP PNEUMONIAE ANTIGEN, URINE: NORMAL
THB: 8 G/DL (ref 11.5–16.5)
TIME ANALYZED: 601
TOTAL CK: 186 U/L (ref 20–200)
TROPONIN: <0.01 NG/ML (ref 0–0.03)
UROBILINOGEN, URINE: 1 E.U./DL
WBC # BLD: 14.9 E9/L (ref 4.5–11.5)
WBC UA: NORMAL /HPF (ref 0–5)

## 2019-06-10 PROCEDURE — 82550 ASSAY OF CK (CPK): CPT

## 2019-06-10 PROCEDURE — 99291 CRITICAL CARE FIRST HOUR: CPT | Performed by: SURGERY

## 2019-06-10 PROCEDURE — 93005 ELECTROCARDIOGRAM TRACING: CPT | Performed by: STUDENT IN AN ORGANIZED HEALTH CARE EDUCATION/TRAINING PROGRAM

## 2019-06-10 PROCEDURE — 6370000000 HC RX 637 (ALT 250 FOR IP): Performed by: STUDENT IN AN ORGANIZED HEALTH CARE EDUCATION/TRAINING PROGRAM

## 2019-06-10 PROCEDURE — 6360000002 HC RX W HCPCS: Performed by: STUDENT IN AN ORGANIZED HEALTH CARE EDUCATION/TRAINING PROGRAM

## 2019-06-10 PROCEDURE — 2700000000 HC OXYGEN THERAPY PER DAY

## 2019-06-10 PROCEDURE — 6370000000 HC RX 637 (ALT 250 FOR IP): Performed by: SURGERY

## 2019-06-10 PROCEDURE — 36415 COLL VENOUS BLD VENIPUNCTURE: CPT

## 2019-06-10 PROCEDURE — 97530 THERAPEUTIC ACTIVITIES: CPT

## 2019-06-10 PROCEDURE — 2580000003 HC RX 258: Performed by: STUDENT IN AN ORGANIZED HEALTH CARE EDUCATION/TRAINING PROGRAM

## 2019-06-10 PROCEDURE — 94640 AIRWAY INHALATION TREATMENT: CPT

## 2019-06-10 PROCEDURE — 85027 COMPLETE CBC AUTOMATED: CPT

## 2019-06-10 PROCEDURE — 83735 ASSAY OF MAGNESIUM: CPT

## 2019-06-10 PROCEDURE — 81001 URINALYSIS AUTO W/SCOPE: CPT

## 2019-06-10 PROCEDURE — 97535 SELF CARE MNGMENT TRAINING: CPT

## 2019-06-10 PROCEDURE — 84484 ASSAY OF TROPONIN QUANT: CPT

## 2019-06-10 PROCEDURE — 82553 CREATINE MB FRACTION: CPT

## 2019-06-10 PROCEDURE — 82805 BLOOD GASES W/O2 SATURATION: CPT

## 2019-06-10 PROCEDURE — 80048 BASIC METABOLIC PNL TOTAL CA: CPT

## 2019-06-10 PROCEDURE — 71275 CT ANGIOGRAPHY CHEST: CPT

## 2019-06-10 PROCEDURE — 87070 CULTURE OTHR SPECIMN AEROBIC: CPT

## 2019-06-10 PROCEDURE — 93010 ELECTROCARDIOGRAM REPORT: CPT | Performed by: INTERNAL MEDICINE

## 2019-06-10 PROCEDURE — 2000000000 HC ICU R&B

## 2019-06-10 PROCEDURE — 51701 INSERT BLADDER CATHETER: CPT

## 2019-06-10 PROCEDURE — 6360000004 HC RX CONTRAST MEDICATION: Performed by: RADIOLOGY

## 2019-06-10 PROCEDURE — 87206 SMEAR FLUORESCENT/ACID STAI: CPT

## 2019-06-10 PROCEDURE — 2580000003 HC RX 258: Performed by: RADIOLOGY

## 2019-06-10 PROCEDURE — 87450 HC DIRECT STREP B ANTIGEN: CPT

## 2019-06-10 PROCEDURE — 84100 ASSAY OF PHOSPHORUS: CPT

## 2019-06-10 PROCEDURE — 87081 CULTURE SCREEN ONLY: CPT

## 2019-06-10 RX ORDER — SODIUM CHLORIDE FOR INHALATION 3 %
4 VIAL, NEBULIZER (ML) INHALATION
Status: DISCONTINUED | OUTPATIENT
Start: 2019-06-10 | End: 2019-06-12

## 2019-06-10 RX ORDER — SODIUM CHLORIDE 0.9 % (FLUSH) 0.9 %
10 SYRINGE (ML) INJECTION PRN
Status: COMPLETED | OUTPATIENT
Start: 2019-06-10 | End: 2019-06-10

## 2019-06-10 RX ORDER — IPRATROPIUM BROMIDE AND ALBUTEROL SULFATE 2.5; .5 MG/3ML; MG/3ML
1 SOLUTION RESPIRATORY (INHALATION)
Status: DISCONTINUED | OUTPATIENT
Start: 2019-06-10 | End: 2019-06-14 | Stop reason: HOSPADM

## 2019-06-10 RX ORDER — ALBUTEROL SULFATE 2.5 MG/3ML
2.5 SOLUTION RESPIRATORY (INHALATION)
Status: DISCONTINUED | OUTPATIENT
Start: 2019-06-10 | End: 2019-06-10

## 2019-06-10 RX ORDER — NICOTINE 21 MG/24HR
1 PATCH, TRANSDERMAL 24 HOURS TRANSDERMAL DAILY
Status: DISCONTINUED | OUTPATIENT
Start: 2019-06-10 | End: 2019-06-14 | Stop reason: HOSPADM

## 2019-06-10 RX ADMIN — IPRATROPIUM BROMIDE AND ALBUTEROL SULFATE 1 AMPULE: .5; 3 SOLUTION RESPIRATORY (INHALATION) at 15:19

## 2019-06-10 RX ADMIN — SENNOSIDES 8.6 MG: 8.6 TABLET, FILM COATED ORAL at 20:59

## 2019-06-10 RX ADMIN — Medication 10 ML: at 05:26

## 2019-06-10 RX ADMIN — Medication 10 ML: at 21:00

## 2019-06-10 RX ADMIN — Medication: at 08:56

## 2019-06-10 RX ADMIN — ALBUTEROL SULFATE 2.5 MG: 2.5 SOLUTION RESPIRATORY (INHALATION) at 06:24

## 2019-06-10 RX ADMIN — METHOCARBAMOL TABLETS 1500 MG: 750 TABLET, COATED ORAL at 10:18

## 2019-06-10 RX ADMIN — ACETAMINOPHEN 650 MG: 325 TABLET, FILM COATED ORAL at 13:50

## 2019-06-10 RX ADMIN — VANCOMYCIN HYDROCHLORIDE 1750 MG: 10 INJECTION, POWDER, LYOPHILIZED, FOR SOLUTION INTRAVENOUS at 21:51

## 2019-06-10 RX ADMIN — ACETAMINOPHEN 650 MG: 325 TABLET, FILM COATED ORAL at 02:58

## 2019-06-10 RX ADMIN — ACETAMINOPHEN 650 MG: 325 TABLET, FILM COATED ORAL at 21:51

## 2019-06-10 RX ADMIN — SODIUM CHLORIDE SOLN NEBU 3% 4 ML: 3 NEBU SOLN at 15:19

## 2019-06-10 RX ADMIN — ENOXAPARIN SODIUM 30 MG: 30 INJECTION SUBCUTANEOUS at 08:57

## 2019-06-10 RX ADMIN — OXYCODONE HYDROCHLORIDE 10 MG: 10 TABLET ORAL at 16:40

## 2019-06-10 RX ADMIN — OXYCODONE HYDROCHLORIDE 10 MG: 10 TABLET ORAL at 04:54

## 2019-06-10 RX ADMIN — IOPAMIDOL 60 ML: 755 INJECTION, SOLUTION INTRAVENOUS at 05:24

## 2019-06-10 RX ADMIN — ATORVASTATIN CALCIUM 40 MG: 40 TABLET, FILM COATED ORAL at 20:59

## 2019-06-10 RX ADMIN — Medication 10 ML: at 08:57

## 2019-06-10 RX ADMIN — ACETAMINOPHEN 650 MG: 325 TABLET, FILM COATED ORAL at 10:44

## 2019-06-10 RX ADMIN — OXYCODONE HYDROCHLORIDE 10 MG: 10 TABLET ORAL at 09:28

## 2019-06-10 RX ADMIN — CEFEPIME 2 G: 2 INJECTION, POWDER, FOR SOLUTION INTRAVENOUS at 09:28

## 2019-06-10 RX ADMIN — OXYCODONE HYDROCHLORIDE 10 MG: 10 TABLET ORAL at 20:59

## 2019-06-10 RX ADMIN — IPRATROPIUM BROMIDE AND ALBUTEROL SULFATE 1 AMPULE: .5; 3 SOLUTION RESPIRATORY (INHALATION) at 11:39

## 2019-06-10 RX ADMIN — METHOCARBAMOL TABLETS 1500 MG: 750 TABLET, COATED ORAL at 13:50

## 2019-06-10 RX ADMIN — SODIUM CHLORIDE SOLN NEBU 3% 4 ML: 3 NEBU SOLN at 11:39

## 2019-06-10 RX ADMIN — DOCUSATE SODIUM 100 MG: 100 CAPSULE, LIQUID FILLED ORAL at 20:59

## 2019-06-10 RX ADMIN — DOCUSATE SODIUM 100 MG: 100 CAPSULE, LIQUID FILLED ORAL at 08:56

## 2019-06-10 RX ADMIN — CEFEPIME 2 G: 2 INJECTION, POWDER, FOR SOLUTION INTRAVENOUS at 17:44

## 2019-06-10 RX ADMIN — OXYCODONE HYDROCHLORIDE 10 MG: 10 TABLET ORAL at 00:46

## 2019-06-10 RX ADMIN — Medication: at 21:00

## 2019-06-10 RX ADMIN — Medication: at 13:50

## 2019-06-10 RX ADMIN — METHOCARBAMOL TABLETS 1500 MG: 750 TABLET, COATED ORAL at 21:01

## 2019-06-10 RX ADMIN — ACETAMINOPHEN 650 MG: 325 TABLET, FILM COATED ORAL at 17:44

## 2019-06-10 RX ADMIN — KETOROLAC TROMETHAMINE 30 MG: 30 INJECTION, SOLUTION INTRAMUSCULAR; INTRAVENOUS at 06:20

## 2019-06-10 RX ADMIN — VANCOMYCIN HYDROCHLORIDE 1750 MG: 10 INJECTION, POWDER, LYOPHILIZED, FOR SOLUTION INTRAVENOUS at 09:28

## 2019-06-10 RX ADMIN — OXYCODONE HYDROCHLORIDE 10 MG: 10 TABLET ORAL at 12:38

## 2019-06-10 RX ADMIN — SODIUM CHLORIDE SOLN NEBU 3% 4 ML: 3 NEBU SOLN at 06:24

## 2019-06-10 RX ADMIN — SODIUM CHLORIDE SOLN NEBU 3% 4 ML: 3 NEBU SOLN at 19:15

## 2019-06-10 RX ADMIN — METHOCARBAMOL TABLETS 1500 MG: 750 TABLET, COATED ORAL at 17:44

## 2019-06-10 RX ADMIN — IPRATROPIUM BROMIDE AND ALBUTEROL SULFATE 1 AMPULE: .5; 3 SOLUTION RESPIRATORY (INHALATION) at 19:15

## 2019-06-10 RX ADMIN — ENOXAPARIN SODIUM 30 MG: 30 INJECTION SUBCUTANEOUS at 20:59

## 2019-06-10 ASSESSMENT — PAIN DESCRIPTION - DESCRIPTORS
DESCRIPTORS: ACHING;DISCOMFORT;SORE
DESCRIPTORS: ACHING;DISCOMFORT

## 2019-06-10 ASSESSMENT — PAIN DESCRIPTION - PAIN TYPE
TYPE: SURGICAL PAIN
TYPE: ACUTE PAIN;SURGICAL PAIN
TYPE: SURGICAL PAIN

## 2019-06-10 ASSESSMENT — PAIN DESCRIPTION - FREQUENCY
FREQUENCY: CONTINUOUS

## 2019-06-10 ASSESSMENT — PAIN DESCRIPTION - LOCATION
LOCATION: ABDOMEN

## 2019-06-10 ASSESSMENT — PAIN DESCRIPTION - ORIENTATION
ORIENTATION: MID

## 2019-06-10 ASSESSMENT — PAIN SCALES - GENERAL
PAINLEVEL_OUTOF10: 3
PAINLEVEL_OUTOF10: 9
PAINLEVEL_OUTOF10: 0
PAINLEVEL_OUTOF10: 5
PAINLEVEL_OUTOF10: 0
PAINLEVEL_OUTOF10: 0
PAINLEVEL_OUTOF10: 7
PAINLEVEL_OUTOF10: 9
PAINLEVEL_OUTOF10: 0
PAINLEVEL_OUTOF10: 7
PAINLEVEL_OUTOF10: 4
PAINLEVEL_OUTOF10: 0
PAINLEVEL_OUTOF10: 5
PAINLEVEL_OUTOF10: 5
PAINLEVEL_OUTOF10: 7
PAINLEVEL_OUTOF10: 6
PAINLEVEL_OUTOF10: 9
PAINLEVEL_OUTOF10: 8
PAINLEVEL_OUTOF10: 8
PAINLEVEL_OUTOF10: 0
PAINLEVEL_OUTOF10: 7
PAINLEVEL_OUTOF10: 4
PAINLEVEL_OUTOF10: 5

## 2019-06-10 ASSESSMENT — PAIN - FUNCTIONAL ASSESSMENT
PAIN_FUNCTIONAL_ASSESSMENT: PREVENTS OR INTERFERES SOME ACTIVE ACTIVITIES AND ADLS

## 2019-06-10 ASSESSMENT — PAIN DESCRIPTION - ONSET
ONSET: ON-GOING

## 2019-06-10 ASSESSMENT — PAIN DESCRIPTION - PROGRESSION: CLINICAL_PROGRESSION: NOT CHANGED

## 2019-06-10 NOTE — PROGRESS NOTES
Occupational Therapy  OT BEDSIDE TREATMENT NOTE      Date:6/10/2019  Patient Name: Juma Aldrich  MRN: [id number]  : 1970  [facility]     Re-evaluating OT: Kevin Post OTR/L #[id number]      OT re-evaluation completed following ex-lap, spleenectomy      AM-PAC Daily Activity Raw Score: 1824     Recommended Adaptive Equipment: TBD:  Reacher, bathroom DME prn     Diagnosis: Hematoma spleen                      Cervical strain                 Patient presented to ED with following fall from ladder     Surgery: 19 emergent ex lap, spleenectomy     Pertinent Medical History:   GERD     Precautions:  Falls, cervical collar, cervical precautions, abdominal precautions, O2/optiflow     Home Living: Pt lives with wife in a 2nd floor apartment with 4 step(s) to enter and 1 rail(s); >10 steps and 1rail to get to 2nd floor; bed/bath on 1 floor. Bathroom setup: Pt using tub/ shower; standard toilet  Equipment owned: none     Prior Level of Function: Indep with ADLs; Indep with IADLs; using no device for ambulation. Driving: yes  Occupation: yes construction; laborious job, requires heavy lifting     Pain Level: 8-9/10 abdominal and neck pain at bed level and throughout session. Therapist educated pt on abdominal and cervical precautions; pacing and proper breathing techniques for pain mgmt.     Cognition: A&O: 4/4; Follows 2 step directions. Pt impulsive. Communication: WFL              Memory: F+              Sequencing: F              Problem solving:  F              Judgement/safety: F-        Functional Assessment:    Initial Eval Status  Date: 19 Treatment Status  Date: 6/10/19 Short Term Goals  Treatment frequency: PRN   Feeding Independent        Grooming Stand by Assist   Declined Modified [place]    UB Dressing Stand by Assist   SBA seated EOB changing gown with max VC for pacing and proper breathing techniques for pain mmgt Modified [place]    LB Dressing Maximal Assist      Cuing on modified techniques within precautions  Min A  Doff/rudy socks seated in arm chair with education/cues on modified techniques for abdominal precautions. SBA during simulated clothing mgmt Modified [place]    Bathing Moderate Assist NT  Modified [place]    Toileting Minimal Assist   NT Modified [place]    Bed Mobility  Supine to sit: Stand by Assist   Sit to supine: Stand by Assist  Supine to sit: Stand by Assist   Sit to supine: Stand by Assist   Supine to sit: Modified [place]   Sit to supine: Modified [place]    Functional Transfers Stand by Assist   SBA: sit <> stand from EOB, arm chair     SBA:  Stand pivot from EOB > arm chair Modified [place]    Functional Mobility Minimal Assist      Ambulated in room and hallway without AD;  + unsteadiness, cuing for pursed lip breathing (desaturated to 82% on 2L)  SBA  Few steps at bedside no device Modified [place]    Balance Sitting:     Static:  SBA    Dynamic:SBA  Standing: min A  Sitting:     Static:  Sup    Dynamic:SBA  Standing: SBA<>min A     Activity Tolerance F-  see comments G   Visual/  Perceptual wfl     wfl                     Hand dominance: right       Strength ROM Additional Info:    RUE  Wfl, formal MMT deferred due to cervical / abdominal precautions  wfl good  and wfl FMC/dexterity noted during ADL tasks      LUE Wfl, formal MMT deferred due to cervical / abdominal precautions  wfl good  and wfl FMC/dexterity noted during ADL tasks      Hearing:  WFL  Sensation: WFL  Tone: WFL  Edema:none noted      Comments: Gumaro Amaya from RN to see pt. Upon arrival pt supine in bed and agreeable to session with PT collaboration. Vitals monitored continuously during session. Extensive line mgmt required. Pt on optiflow. Completion of BUE AROM exercise x10 reps in bed level chair position to decreased stiffness and improve overall use during ADLS; focus on pacing and proper breathing techniques with F carryover.  Assisted pt in donning/positioning cervical collar at bed level with good comfort noted. After bed mobility, pt sat EOB to change gown as stated above; SpO2 90-91% on optiflow, HR ranged in 130s bpm. Increased time and rest breaks required. Pt performed STS and pivot transfer no device to arm chair for completion of LB ADLs as stated above. Improved SpO2 95%; HR increased to 140s due to elevated pain levels. Max cues to focus on proper breathing techniques/pacing. Verbally educated pt on abdominal/cervical precautions and use of reacher for floor level retrieval; good understanding. Wife present as well. Pt agreeable to sit up in chair for meal. Declined grooming tasks at this time. At end of session pt sitting upright in arm chair with all devices within reach, all lines and tubes intact, blanket present for bracing support. Prior to and at the end of session, environmental modifications/line management completed for patients safety and efficiency of treatment session. RN notified of pt status. Pt required cues and education as noted above for safe facilitation and completion of tasks. During functional activites and ADLs pt additionally educated on proper hand placement, safety technique, sequencing, and energy conservation/pacing/breathing techniques, OT POC/progression, OT role, importance of completing ADL tasks daily as IND as possible to aide in recovery process, fall risk precautions/call light use. Therapist provided skilled monitoring of HR, O2 saturation, blood pressure and patients response during treatment session. Vitals:   BP at rest: -- BP at end of session: 137/69   HR at rest: 125 bom HR at end of session: 144 bpm   Spo2 at rest: 98% optiflow  Spo2 at end of session: 95% optiflow     · Pt has made good progress towards set goals.    · Continue with current plan of care    Time in: 09:00  Time out: 09:25  Total Tx Time: 25 minutes    Sri Ma, OTR/L [id number]

## 2019-06-10 NOTE — PLAN OF CARE
Problem: Falls - Risk of:  Goal: Will remain free from falls  Description  Will remain free from falls  6/10/2019 0700 by Justine Garcia RN  Outcome: Met This Shift  6/10/2019 0254 by Sabas Herrera RN  Outcome: Met This Shift  Goal: Absence of physical injury  Description  Absence of physical injury  6/10/2019 0700 by Justine Garcia RN  Outcome: Met This Shift  6/10/2019 0254 by Sabas Herrera RN  Outcome: Met This Shift     Problem: Pain:  Goal: Pain level will decrease  Description  Pain level will decrease  6/10/2019 0700 by Justine Garcia RN  Outcome: Met This Shift  6/10/2019 0254 by Sabas Herrera RN  Outcome: Met This Shift  Goal: Control of acute pain  Description  Control of acute pain  6/10/2019 0700 by Justine Garcia RN  Outcome: Met This Shift  6/10/2019 0254 by Sabas Herrera RN  Outcome: Met This Shift  Goal: Control of chronic pain  Description  Control of chronic pain  6/10/2019 0700 by Justine Garcia RN  Outcome: Met This Shift  6/10/2019 0254 by Sabas Herrera RN  Outcome: Met This Shift     Problem: Anxiety/Stress:  Goal: Level of anxiety will decrease  Description  Level of anxiety will decrease  6/10/2019 0700 by Justine Garcia RN  Outcome: Met This Shift  6/10/2019 0254 by Sabas Herrera RN  Outcome: Met This Shift     Problem: Fluid Volume - Imbalance:  Goal: Absence of imbalanced fluid volume signs and symptoms  Description  Absence of imbalanced fluid volume signs and symptoms  6/10/2019 0700 by Justine Garcia RN  Outcome: Met This Shift  6/10/2019 0254 by Sabas Herrear RN  Outcome: Met This Shift     Problem: Nutrition Deficit:  Goal: Ability to achieve adequate nutritional intake will improve  Description  Ability to achieve adequate nutritional intake will improve  6/10/2019 0700 by Justine Garcia RN  Outcome: Met This Shift  6/10/2019 0254 by Sabas Herrera RN  Outcome: Met This Shift     Problem: Skin Integrity - Impaired:  Goal: Absence of new skin breakdown  Description  Absence of new skin breakdown  6/10/2019 0700 by Gareth Felton RN  Outcome: Met This Shift  6/10/2019 0254 by Sarah Rivera RN  Outcome: Met This Shift     Problem: Musculor/Skeletal Functional Status  Goal: Highest potential functional level  6/10/2019 0700 by Gareth Felton RN  Outcome: Met This Shift  6/10/2019 0254 by Sarah Rivera RN  Outcome: Met This Shift  Goal: Absence of falls  6/10/2019 0700 by Gareth Felton RN  Outcome: Met This Shift  6/10/2019 0254 by Sarah Rivera RN  Outcome: Met This Shift     Problem: Risk for Impaired Skin Integrity  Goal: Tissue integrity - skin and mucous membranes  Description  Structural intactness and normal physiological function of skin and  mucous membranes.   6/10/2019 0700 by Gareth Felton RN  Outcome: Met This Shift  6/10/2019 0254 by Sarah Rivera RN  Outcome: Met This Shift     Problem: Skin Integrity - Impaired:  Goal: Will show no infection signs and symptoms  Description  Will show no infection signs and symptoms  6/10/2019 0700 by Gareth Felton RN  Outcome: Not Met This Shift  6/10/2019 0254 by Sarah Rivera RN  Outcome: Met This Shift

## 2019-06-10 NOTE — PROGRESS NOTES
CTA of chest with contrast ordered stat. Patient needs a 18 or 20 gauge needle but patient is a hard stick. Called MICU, they will send a nurse to attempt an IV site.

## 2019-06-10 NOTE — PROGRESS NOTES
Neurosurg progress note  VITALS:  BP (!) 97/53   Pulse 111   Temp 99.5 °F (37.5 °C) (Oral)   Resp 30   Ht 6' (1.829 m)   Wt 181 lb 9.6 oz (82.4 kg)   SpO2 99%   BMI 24.63 kg/m²   24HR INTAKE/OUTPUT:    Intake/Output Summary (Last 24 hours) at 6/10/2019 1141  Last data filed at 6/10/2019 1000  Gross per 24 hour   Intake 840 ml   Output 650 ml   Net 190 ml     Xr Chest Standard (2 Vw)    Result Date: 2019  Patient MRN:  [id number] : 1970 Age: 50 years Gender: Male Order Date:  2019 12:45 PM EXAM: XR CHEST (2 VW) 3 images INDICATION:  dyspnea, fall dyspnea, fall COMPARISON: 2019 FINDINGS: The heart and the mediastinal structures are normal. There is mild COPD. There is minimal infiltrate/atelectasis in the right lung base. There is no pneumothorax. There is gastric distention. COPD with minimal atelectasis/infiltrates in the right lung base. Ct Head Wo Contrast    Result Date: 2019  Patient MRN:  [id number] : 1970 Age: 50 years Gender: Male Order Date:  2019 12:45 PM EXAM: CT HEAD WO CONTRAST NUMBER OF IMAGES:  161 INDICATION:  HEAD TRAUMA, CLOSED, MILD, GCS >= 13, NO RISK FACTORS, NEURO EXAM NORMAL COMPARISON: None Technique: Low-dose CT  acquisition technique included one of following options; 1 . Automated exposure control, 2. Adjustment of MA and or KV according to patient's size or 3. Use of iterative reconstruction. Multiple CT sections were obtained with sagittal and coronal MPR reconstructions. The ventricles are unremarkable. The gyri and sulci appear unremarkable. The white matter appears unremarkable. There is no evidence for hemorrhage. There is no infarct identified. There is no mass effect identified. There is no mass identified. Unremarkable scan of the head.      Ct Chest W Contrast    Result Date: 2019  Patient MRN:  [id number] : 1970 Age: 50 years Gender: Male Order Date:  2019 12:45 PM EXAM: CT CHEST W CONTRAST, CT ABDOMEN PELVIS W IV CONTRAST Dosage: 1184 mGY-cm Contrast: 110 mL Isovue-370 vertebral body height in the thoracic spine is normal. INDICATION:  left chest pain, fell 12 feet  COMPARISON: None FINDINGS:  Vertebral body height in the thoracic spine is normal. There is a subcutaneous nodule medially in the right hemithorax. Thoracic aorta is normal. Heart size is normal. There is no hilar or mediastinal lymph nodes. There are small left axillary nodes. There is borderline hyperinflation in the upper lungs. There is minimal scarring in the right middle lobe and left lung base. No obvious thoracic fractures are noted. There is a moderate amount of upper abdominal ascites/fluid. Liver,pancreas, and kidneys are normal aside from an upper pole right renal cyst. There is a small lower pole left renal cyst. Spleen has a couple small low-attenuation zones on its periphery (series 503 image 53). Considering the amount pelvic fluid/hemorrhage findings are suspicious of a subtle splenic laceration. There is a significant amount of pelvic ascites. The density measures 61 Hounsfield. Bladder appears normal. No pelvic fractures are identified. Small to moderate amount of upper abdominal ascites/fluid and significant amount of pelvic fluid. The density of this has Hounsfield units measuring 61, and is most compatible with hemorrhage. There are some ill-defined subtle densities in the posterior aspect of the spleen. Findings are concerning for a subtle splenic laceration. No other visceral organ abnormality is identified.  CRITICAL FINDING: Results were called and read back to the emergency room on 2019 at 1430 hours    Ct Cervical Spine Wo Contrast    Result Date: 2019  Patient MRN:  [id number] : 1970 Age: 50 years Gender: Male Order Date:  2019 12:45 PM EXAM: CT CERVICAL SPINE WO CONTRAST NUMBER OF IMAGES:  732 INDICATION:  NECK PAIN FOLLOWING TRAUMA COMPARISON: None Multiple CT sections were obtained with sagittal and fluid. The density of this has Hounsfield units measuring 61, and is most compatible with hemorrhage. There are some ill-defined subtle densities in the posterior aspect of the spleen. Findings are concerning for a subtle splenic laceration. No other visceral organ abnormality is identified.  CRITICAL FINDING: Results were called and read back to the emergency room on 5/31/2019 at 1430 hours    CBC:   Lab Results   Component Value Date    WBC 14.9 06/10/2019    RBC 2.49 06/10/2019    HGB 7.1 06/10/2019    HCT 22.1 06/10/2019    MCV 88.8 06/10/2019    MCH 28.5 06/10/2019    MCHC 32.1 06/10/2019    RDW 14.1 06/10/2019     06/10/2019    MPV 9.4 06/10/2019     BMP:    Lab Results   Component Value Date     06/10/2019    K 3.6 06/10/2019    K 4.5 06/05/2019     06/10/2019    CO2 23 06/10/2019    BUN 18 06/10/2019    LABALBU 3.0 06/03/2019    CREATININE 0.9 06/10/2019    CALCIUM 8.1 06/10/2019    GFRAA >60 06/10/2019    LABGLOM >60 06/10/2019    GLUCOSE 100 06/10/2019      sodium chloride (Inhalant)  4 mL Nebulization Q4H WA    nicotine  1 patch Transdermal Daily    Followed by   Jeremi Hsieh ON 6/17/2019] nicotine  1 patch Transdermal Daily    ipratropium-albuterol  1 ampule Inhalation Q4H WA    cefepime  2 g Intravenous Q8H    vancomycin  1,750 mg Intravenous Q12H    bisacodyl  10 mg Rectal Daily    lidocaine  1 patch Transdermal Daily    methocarbamol  1,500 mg Oral 4x Daily    enoxaparin  30 mg Subcutaneous BID    docusate sodium  100 mg Oral BID    senna  1 tablet Oral Nightly    sodium chloride flush  10 mL Intravenous 2 times per day    bacitracin zinc   Topical TID    acetaminophen  650 mg Oral Q4H    atorvastatin  40 mg Oral Nightly     Awake and alert, follows commands perrl eomi  Assessment:  Patient Active Problem List   Diagnosis    Chest pain    Tobacco use    Atypical chest pain    Cocaine use    Nodule of left lung    Numbness and tingling of right lower extremity   

## 2019-06-10 NOTE — PROGRESS NOTES
[facility]  Surgical Intensive Care Unit  Daily Progress Note        MECHANISM OF INJURY:  Fall 10'    INJURIES:  Patient Active Problem List   Diagnosis    Chest pain    Tobacco use    Atypical chest pain    Cocaine use    Nodule of left lung    Numbness and tingling of right lower extremity    Unstable angina (HCC)    Leukocytosis    Hep C w/o coma, chronic (HCC)    Hematoma of spleen, closed, initial encounter    Traumatic hemoperitoneum    Closed traumatic minimally displaced fracture of one rib of left side    Acute blood loss anemia    Laceration of spleen    Multiple closed fractures of ribs of left side       Hospital Course/Procedures/Operation/treatments:   5/31-- Torsten Lopez a 50 y. o. male who presents from home for evaluation of fall from 10 feet. Patient fell on his left side, hit his head and denies LOC. He is not on [address]. He is having sharp pain in his left flank, shoulder and midline in his neck.   CT scan demonstrates left rib fracture with splenic laceration and free intraabdominal blood without blush. Admitted to SICU for hemodynamic and H&H monitoring   6/1--nothing new overnight  6/2--no new issues  6/3-- no issues overnight  6/4 Transferred from SICU to general floor. Continues to complain of cervical spine pain. 6/5 MRI cervical spine showed ligamentous injury. Neurosurgery consulted. Custom cervical collar ordered  6/6: Hypotensive and pale, STAT CT abdomen/pelvis demonstrated a rebleeding of splenic laceration, s/p EX-lap with splenectomy and repair or colonic serosal tears. Pain control moderately controlled this AM. Awaiting bowel function. 6/7: Pain controlled. No return of bowel function. Voiding s/p Gardiner removal.  6/8: Continue no bowel function, NPO, +OOB.  Received 1 U PRBC yesterday  6/9: +flatus, tachypneic though not SOB, stated on CLD, saline locked   6/10: transferred to SICU for hypoxia, CTA negative for PE, optiflow started, abx for PNA      OVERNIGHT EVENTS:   Hypoxia--transferred to SICU      PHYSICAL EXAM:  CONSTITUTIONAL:  Awake, appropriate  EYES:  PERRL  LUNGS:  Coarse and shallow BS bilaterally  CARDIOVASCULAR:  RRR  ABDOMEN:  Soft, mild distention, mild incisional TTP, wound--serosanguinous drainage; no evidence of infection  MUSCULOSKELETAL:  LIZARRAGA x 4  NEUROLOGIC:  GCS 15      PROBLEMS/PLANS:  1.  Grade 3 splenic laceration with rebleed--s/p splenectomy  --ID c/s for vaccines prior to d/c  --monitor H/H  2. Acute respiratory failure d/t trauma/pneumonia  --IS, cough, deep breathing  --PEP flutter  --duonebs  --CTA negative  3. Wound drainage  --monitor output, if continues, will need staples removed to check for dehiscence  4. Constipation  --dulcolax  --increase bowel reg  5. Tobacco abuse  --nicoderm wean  6. Cspine ligamentous injury  --NSG following  --collar when OOB  7. HCAP  --vanco, cefepime  --sputum culture      PROPHYLAXIS:   Stress ulcer: diet   VTE: SCDs, lovenox      DISPOSITION:   Continue ICU    CC TIME:  I spent 42 min managing this patients critical issues which included acute respiratory failure, wound complications, pneumonia excluding time teaching and performing procedures.         Naida Simmons MD  6/10/2019  5:33 PM

## 2019-06-10 NOTE — PROGRESS NOTES
Patient is at 90% on 7L high fowlers. Patient is to wean off oxygen. Will check O2 frequently to try to wean patient off.

## 2019-06-10 NOTE — PROGRESS NOTES
Pharmacy Consultation Note  (Antibiotic Dosing and Monitoring)    Initial consult date: 6//10/19  Consulting physician: Dr Eda Hsu  Drug(s): Vancomycin  Indication: Empiric antibiotics for respiratory process    Ht Readings from Last 1 Encounters:   19 6' (1.829 m)     Wt Readings from Last 1 Encounters:   06/10/19 181 lb 9.6 oz (82.4 kg)       Age/  Gender Actual BW IBW DW  Allergy Information   50 y.o. male 82.4 kg 77.6 kg 82.4 kg  Patient has no known allergies. Date  WBC BUN/CR Drug/Dose Time   Given Level(s)   (Time) Comments   6/10/19  Day #1 14.9 18/0.9 Vancomycin 1750 mg IV q12h 0928                                  Estimated Creatinine Clearance: 110 mL/min (based on SCr of 0.9 mg/dL). Intake/Output Summary (Last 24 hours) at 6/10/2019 1113  Last data filed at 6/10/2019 1000  Gross per 24 hour   Intake 840 ml   Output 650 ml   Net 190 ml     Urine output over the last 24 hours: incomplete documentation    Diuretics ordered in the last 24 hours: Furosemide 10 mg IV x1 yesterday    Temp max: Temp (24hrs), Av.3 °F (38.5 °C), Min:98.6 °F (37 °C), Max:103.3 °F (39.6 °C)      Cultures:  available culture and sensitivity results were reviewed in EPIC     Nares - MRSA not isolated    6/10 Nares - Pending    6/10 Respiratory cx (spuex) - Ordered    6/10 Legionella Urinary Antigen - Ordered    6/10 Strep pneumoniae Urinary Antigen - Ordered    6/10 CTA Chest: 1. No pulmonary emboli. 2. Moderate-sized consolidations within the posterior lower lobes bilaterally,   increased from prior study, likely worsening multifocal pneumonia. 3. New, patchy groundglass opacities scattered throughout the upper lobes and superior segment right lower lobe, likely multifocal pneumonitis/pneumonia. Assessment:  · 49 yo M admitted after fall from ladder.   Injuries include grade IV splenic laceration s/p exploratory laparotomy with splenectomy  and a C6 spinous process fracture with ligamentous

## 2019-06-10 NOTE — PROGRESS NOTES
Patient started having a hard time breathing, 87% O2 on 7L. Tried 9L, only got to 89%. Put a non-rebreather on, at 93%. Sitting on the side of the bed. Patient a/o following commands. Family as bedside. Resident notified.

## 2019-06-10 NOTE — PROGRESS NOTES
Called CAT scan to see when they were going to get patient for test, stated that they would send for him now.

## 2019-06-10 NOTE — PROGRESS NOTES
Physical Therapy  Daily Treatment Note  NAME: Denton Arias  : 1970  MRN: [id number]    Date of Service: 6/10/2019    Re-Evaluating PT:  Lauri Salmeron PT, DPT [id number]     ROOM #: [id number]  DIAGNOSIS: Hematoma of spleen  PRECAUTIONS: Falls, C-collar, c-spine precautions, abdominal precautions, L rib fx  PMHx: GERD  PROCEDURES:  emergent ex lap, spleenectomy     Social:  Pt lives with wife in a 2nd floor apartment with 4 step(s) and 1 rail(s) to enter.  Once inside, >10 steps and 1 rail to apartment. Prior to admission pt walked with no device and was Independent.  Pt works in construction.                                  Re- Evaluation  Date: 19 Treatment  Date: 6/10/19 Short Term/ Long Term   Goals   AM-PAC 6 Clicks 41/11 15/35     Does pt have pain? 7/10 abdomen Abdominal pain     Bed Mobility  Rolling: SBA  Supine to sit: SBA  Sit to supine: NT  Scooting: SBA Rolling: SBA  Supine to sit: SBA  Sit to supine: NT  Scooting: SBA toward EOB Mod Independent   Transfers Sit to stand: SBA  Stand to sit: SBA  Stand pivot: Felix no device Sit to stand: SBA  Stand to sit: SBA  Stand pivot: Min A without AD Independent   Ambulation   65 feet with Felix with no device NT (see comments) >400 feet Independently   Stair negotiation: ascended and descended NT NT >10 steps with 1 rail with Mod Independent   BLE ROM WNL NT     BLE strength Grossly 5/5 NT     Balance Sitting: SBA  Standing: Felix no device Sitting: SBA  Standing: SBA without AD Sitting: Independent  Standing: Independent     Patient education  Pt educated on pursed lip breathing. Patient response to education:   Pt verbalized understanding Pt demonstrated skill Pt requires further education in this area   Yes With cueing Yes     Comments:  Pt was supine in bed upon room entry, agreeable to PT treatment. Pt on optiflow with sats fluctuating 90-95%. Pt had shallow breaths and was SOB throughout session.  Frequent verbal cues with demonstration needed

## 2019-06-10 NOTE — PROGRESS NOTES
Patient pulse ox was at 89% at 7L high fowlers. Had patient take some deep breaths and patient still remained at 89%. Paitent turned lay on his left side stating his breathing is slightly relieved this way. Patient pulse ox iraj to 93% on 7L high fowlers while he was on his left side. Patient still had green/brown frothy sputum. Encouraged patient to deep breathe and use SMI and to call if he was feeling short of breathe.  Patient knows that he is to wean off O2

## 2019-06-10 NOTE — CARE COORDINATION
SOCIAL WORK/CASEMANAGEMENT TRANSITION OF CARE PLANNING: I met with pt in the unit. He said  He now has pneumonia. Pt was tearful about not being able to pay rent past July. He said he and wife are both not able to work and he is not sure when he will be able to go back. He has family but none have been to visit locally and he doesn't think they will let him stay with them. He is not  and [place]. His plan is home somewhere on discharge.  Jodee Gomez  6/10/2019

## 2019-06-11 ENCOUNTER — APPOINTMENT (OUTPATIENT)
Dept: CT IMAGING | Age: 49
DRG: 650 | End: 2019-06-11
Payer: COMMERCIAL

## 2019-06-11 LAB
ABO/RH: NORMAL
ALBUMIN SERPL-MCNC: 2.7 G/DL (ref 3.5–5.2)
ALP BLD-CCNC: 72 U/L (ref 40–129)
ALT SERPL-CCNC: 14 U/L (ref 0–40)
ANION GAP SERPL CALCULATED.3IONS-SCNC: 12 MMOL/L (ref 7–16)
ANTIBODY SCREEN: NORMAL
AST SERPL-CCNC: 19 U/L (ref 0–39)
BILIRUB SERPL-MCNC: 0.2 MG/DL (ref 0–1.2)
BLOOD BANK DISPENSE STATUS: NORMAL
BLOOD BANK PRODUCT CODE: NORMAL
BPU ID: NORMAL
BUN BLDV-MCNC: 13 MG/DL (ref 6–20)
CALCIUM SERPL-MCNC: 8 MG/DL (ref 8.6–10.2)
CHLORIDE BLD-SCNC: 102 MMOL/L (ref 98–107)
CO2: 23 MMOL/L (ref 22–29)
CREAT SERPL-MCNC: 0.9 MG/DL (ref 0.7–1.2)
DESCRIPTION BLOOD BANK: NORMAL
GFR AFRICAN AMERICAN: >60
GFR NON-AFRICAN AMERICAN: >60 ML/MIN/1.73
GLUCOSE BLD-MCNC: 99 MG/DL (ref 74–99)
HCT VFR BLD CALC: 21.1 % (ref 37–54)
HEMOGLOBIN: 6.8 G/DL (ref 12.5–16.5)
MCH RBC QN AUTO: 28.8 PG (ref 26–35)
MCHC RBC AUTO-ENTMCNC: 32.2 % (ref 32–34.5)
MCV RBC AUTO: 89.4 FL (ref 80–99.9)
MRSA CULTURE ONLY: NORMAL
PDW BLD-RTO: 14.4 FL (ref 11.5–15)
PLATELET # BLD: 622 E9/L (ref 130–450)
PMV BLD AUTO: 9.5 FL (ref 7–12)
POTASSIUM SERPL-SCNC: 3.5 MMOL/L (ref 3.5–5)
PROCALCITONIN: 10.14 NG/ML (ref 0–0.08)
RBC # BLD: 2.36 E12/L (ref 3.8–5.8)
SODIUM BLD-SCNC: 137 MMOL/L (ref 132–146)
TOTAL PROTEIN: 6 G/DL (ref 6.4–8.3)
WBC # BLD: 28.2 E9/L (ref 4.5–11.5)

## 2019-06-11 PROCEDURE — 6370000000 HC RX 637 (ALT 250 FOR IP): Performed by: STUDENT IN AN ORGANIZED HEALTH CARE EDUCATION/TRAINING PROGRAM

## 2019-06-11 PROCEDURE — 6360000002 HC RX W HCPCS: Performed by: STUDENT IN AN ORGANIZED HEALTH CARE EDUCATION/TRAINING PROGRAM

## 2019-06-11 PROCEDURE — 86923 COMPATIBILITY TEST ELECTRIC: CPT

## 2019-06-11 PROCEDURE — 97530 THERAPEUTIC ACTIVITIES: CPT

## 2019-06-11 PROCEDURE — 6360000004 HC RX CONTRAST MEDICATION: Performed by: RADIOLOGY

## 2019-06-11 PROCEDURE — 2500000003 HC RX 250 WO HCPCS: Performed by: EMERGENCY MEDICINE

## 2019-06-11 PROCEDURE — 84145 PROCALCITONIN (PCT): CPT

## 2019-06-11 PROCEDURE — 2580000003 HC RX 258: Performed by: RADIOLOGY

## 2019-06-11 PROCEDURE — 2580000003 HC RX 258: Performed by: STUDENT IN AN ORGANIZED HEALTH CARE EDUCATION/TRAINING PROGRAM

## 2019-06-11 PROCEDURE — 2000000000 HC ICU R&B

## 2019-06-11 PROCEDURE — 6370000000 HC RX 637 (ALT 250 FOR IP): Performed by: SURGERY

## 2019-06-11 PROCEDURE — 85027 COMPLETE CBC AUTOMATED: CPT

## 2019-06-11 PROCEDURE — P9016 RBC LEUKOCYTES REDUCED: HCPCS

## 2019-06-11 PROCEDURE — 36415 COLL VENOUS BLD VENIPUNCTURE: CPT

## 2019-06-11 PROCEDURE — 86850 RBC ANTIBODY SCREEN: CPT

## 2019-06-11 PROCEDURE — 99291 CRITICAL CARE FIRST HOUR: CPT | Performed by: SURGERY

## 2019-06-11 PROCEDURE — 86900 BLOOD TYPING SEROLOGIC ABO: CPT

## 2019-06-11 PROCEDURE — 74177 CT ABD & PELVIS W/CONTRAST: CPT

## 2019-06-11 PROCEDURE — 94640 AIRWAY INHALATION TREATMENT: CPT

## 2019-06-11 PROCEDURE — 2700000000 HC OXYGEN THERAPY PER DAY

## 2019-06-11 PROCEDURE — 86901 BLOOD TYPING SEROLOGIC RH(D): CPT

## 2019-06-11 PROCEDURE — 97535 SELF CARE MNGMENT TRAINING: CPT

## 2019-06-11 PROCEDURE — 80053 COMPREHEN METABOLIC PANEL: CPT

## 2019-06-11 RX ORDER — MORPHINE SULFATE 2 MG/ML
2 INJECTION, SOLUTION INTRAMUSCULAR; INTRAVENOUS ONCE
Status: COMPLETED | OUTPATIENT
Start: 2019-06-11 | End: 2019-06-11

## 2019-06-11 RX ORDER — POTASSIUM CHLORIDE 20 MEQ/1
40 TABLET, EXTENDED RELEASE ORAL ONCE
Status: COMPLETED | OUTPATIENT
Start: 2019-06-11 | End: 2019-06-11

## 2019-06-11 RX ORDER — 0.9 % SODIUM CHLORIDE 0.9 %
250 INTRAVENOUS SOLUTION INTRAVENOUS ONCE
Status: DISCONTINUED | OUTPATIENT
Start: 2019-06-11 | End: 2019-06-14 | Stop reason: HOSPADM

## 2019-06-11 RX ORDER — POLYVINYL ALCOHOL 14 MG/ML
1 SOLUTION/ DROPS OPHTHALMIC PRN
Status: DISCONTINUED | OUTPATIENT
Start: 2019-06-11 | End: 2019-06-14 | Stop reason: HOSPADM

## 2019-06-11 RX ORDER — FENTANYL CITRATE 50 UG/ML
125 INJECTION, SOLUTION INTRAMUSCULAR; INTRAVENOUS ONCE
Status: COMPLETED | OUTPATIENT
Start: 2019-06-11 | End: 2019-06-11

## 2019-06-11 RX ORDER — LACTULOSE 10 G/15ML
20 SOLUTION ORAL 2 TIMES DAILY
Status: DISCONTINUED | OUTPATIENT
Start: 2019-06-11 | End: 2019-06-14 | Stop reason: HOSPADM

## 2019-06-11 RX ORDER — SODIUM CHLORIDE 0.9 % (FLUSH) 0.9 %
10 SYRINGE (ML) INJECTION PRN
Status: DISCONTINUED | OUTPATIENT
Start: 2019-06-11 | End: 2019-06-14 | Stop reason: HOSPADM

## 2019-06-11 RX ADMIN — Medication: at 21:42

## 2019-06-11 RX ADMIN — SODIUM CHLORIDE SOLN NEBU 3% 4 ML: 3 NEBU SOLN at 16:00

## 2019-06-11 RX ADMIN — OXYCODONE HYDROCHLORIDE 10 MG: 10 TABLET ORAL at 00:59

## 2019-06-11 RX ADMIN — SODIUM CHLORIDE SOLN NEBU 3% 4 ML: 3 NEBU SOLN at 19:25

## 2019-06-11 RX ADMIN — OXYCODONE HYDROCHLORIDE 10 MG: 10 TABLET ORAL at 13:29

## 2019-06-11 RX ADMIN — DOCUSATE SODIUM 100 MG: 100 CAPSULE, LIQUID FILLED ORAL at 21:46

## 2019-06-11 RX ADMIN — OXYCODONE HYDROCHLORIDE 10 MG: 10 TABLET ORAL at 21:42

## 2019-06-11 RX ADMIN — FENTANYL CITRATE 125 MCG: 50 INJECTION, SOLUTION INTRAMUSCULAR; INTRAVENOUS at 15:17

## 2019-06-11 RX ADMIN — ENOXAPARIN SODIUM 30 MG: 30 INJECTION SUBCUTANEOUS at 08:52

## 2019-06-11 RX ADMIN — Medication: at 08:53

## 2019-06-11 RX ADMIN — SODIUM CHLORIDE SOLN NEBU 3% 4 ML: 3 NEBU SOLN at 06:54

## 2019-06-11 RX ADMIN — IPRATROPIUM BROMIDE AND ALBUTEROL SULFATE 1 AMPULE: .5; 3 SOLUTION RESPIRATORY (INHALATION) at 11:01

## 2019-06-11 RX ADMIN — FAMOTIDINE 20 MG: 10 INJECTION INTRAVENOUS at 09:40

## 2019-06-11 RX ADMIN — CEFEPIME 2 G: 2 INJECTION, POWDER, FOR SOLUTION INTRAVENOUS at 16:38

## 2019-06-11 RX ADMIN — Medication 10 ML: at 08:52

## 2019-06-11 RX ADMIN — IPRATROPIUM BROMIDE AND ALBUTEROL SULFATE 1 AMPULE: .5; 3 SOLUTION RESPIRATORY (INHALATION) at 06:54

## 2019-06-11 RX ADMIN — CEFEPIME 2 G: 2 INJECTION, POWDER, FOR SOLUTION INTRAVENOUS at 00:56

## 2019-06-11 RX ADMIN — METHOCARBAMOL TABLETS 1500 MG: 750 TABLET, COATED ORAL at 13:30

## 2019-06-11 RX ADMIN — MORPHINE SULFATE 2 MG: 2 INJECTION, SOLUTION INTRAMUSCULAR; INTRAVENOUS at 19:40

## 2019-06-11 RX ADMIN — CEFEPIME 2 G: 2 INJECTION, POWDER, FOR SOLUTION INTRAVENOUS at 08:53

## 2019-06-11 RX ADMIN — SODIUM CHLORIDE SOLN NEBU 3% 4 ML: 3 NEBU SOLN at 11:01

## 2019-06-11 RX ADMIN — ACETAMINOPHEN 650 MG: 325 TABLET, FILM COATED ORAL at 06:13

## 2019-06-11 RX ADMIN — IOHEXOL 50 ML: 240 INJECTION, SOLUTION INTRATHECAL; INTRAVASCULAR; INTRAVENOUS; ORAL at 16:57

## 2019-06-11 RX ADMIN — LACTULOSE 20 G: 20 SOLUTION ORAL at 21:42

## 2019-06-11 RX ADMIN — ENOXAPARIN SODIUM 30 MG: 30 INJECTION SUBCUTANEOUS at 21:42

## 2019-06-11 RX ADMIN — ATORVASTATIN CALCIUM 40 MG: 40 TABLET, FILM COATED ORAL at 21:45

## 2019-06-11 RX ADMIN — Medication 10 ML: at 18:39

## 2019-06-11 RX ADMIN — ACETAMINOPHEN 650 MG: 325 TABLET, FILM COATED ORAL at 09:42

## 2019-06-11 RX ADMIN — ACETAMINOPHEN 650 MG: 325 TABLET, FILM COATED ORAL at 21:43

## 2019-06-11 RX ADMIN — Medication: at 13:32

## 2019-06-11 RX ADMIN — OXYCODONE HYDROCHLORIDE 10 MG: 10 TABLET ORAL at 17:31

## 2019-06-11 RX ADMIN — IPRATROPIUM BROMIDE AND ALBUTEROL SULFATE 1 AMPULE: .5; 3 SOLUTION RESPIRATORY (INHALATION) at 16:00

## 2019-06-11 RX ADMIN — LACTULOSE 20 G: 20 SOLUTION ORAL at 13:32

## 2019-06-11 RX ADMIN — VANCOMYCIN HYDROCHLORIDE 1750 MG: 10 INJECTION, POWDER, LYOPHILIZED, FOR SOLUTION INTRAVENOUS at 09:42

## 2019-06-11 RX ADMIN — DOCUSATE SODIUM 100 MG: 100 CAPSULE, LIQUID FILLED ORAL at 08:53

## 2019-06-11 RX ADMIN — Medication 10 ML: at 21:43

## 2019-06-11 RX ADMIN — ACETAMINOPHEN 650 MG: 325 TABLET, FILM COATED ORAL at 02:11

## 2019-06-11 RX ADMIN — POTASSIUM CHLORIDE 40 MEQ: 20 TABLET, EXTENDED RELEASE ORAL at 08:47

## 2019-06-11 RX ADMIN — OXYCODONE HYDROCHLORIDE 10 MG: 10 TABLET ORAL at 05:28

## 2019-06-11 RX ADMIN — ACETAMINOPHEN 650 MG: 325 TABLET, FILM COATED ORAL at 14:16

## 2019-06-11 RX ADMIN — Medication 10 ML: at 15:20

## 2019-06-11 RX ADMIN — OXYCODONE HYDROCHLORIDE 10 MG: 10 TABLET ORAL at 09:26

## 2019-06-11 RX ADMIN — Medication 10 ML: at 17:28

## 2019-06-11 RX ADMIN — METHOCARBAMOL TABLETS 1500 MG: 750 TABLET, COATED ORAL at 08:52

## 2019-06-11 RX ADMIN — IPRATROPIUM BROMIDE AND ALBUTEROL SULFATE 1 AMPULE: .5; 3 SOLUTION RESPIRATORY (INHALATION) at 19:25

## 2019-06-11 RX ADMIN — METHOCARBAMOL TABLETS 1500 MG: 750 TABLET, COATED ORAL at 21:42

## 2019-06-11 RX ADMIN — IOPAMIDOL 110 ML: 755 INJECTION, SOLUTION INTRAVENOUS at 18:39

## 2019-06-11 RX ADMIN — SENNOSIDES 8.6 MG: 8.6 TABLET, FILM COATED ORAL at 21:42

## 2019-06-11 ASSESSMENT — PAIN DESCRIPTION - ORIENTATION
ORIENTATION: MID;LEFT
ORIENTATION: MID

## 2019-06-11 ASSESSMENT — PAIN DESCRIPTION - LOCATION
LOCATION: ABDOMEN

## 2019-06-11 ASSESSMENT — PAIN SCALES - GENERAL
PAINLEVEL_OUTOF10: 5
PAINLEVEL_OUTOF10: 0
PAINLEVEL_OUTOF10: 7
PAINLEVEL_OUTOF10: 8
PAINLEVEL_OUTOF10: 10
PAINLEVEL_OUTOF10: 5
PAINLEVEL_OUTOF10: 10
PAINLEVEL_OUTOF10: 8
PAINLEVEL_OUTOF10: 4
PAINLEVEL_OUTOF10: 8
PAINLEVEL_OUTOF10: 0
PAINLEVEL_OUTOF10: 6
PAINLEVEL_OUTOF10: 7
PAINLEVEL_OUTOF10: 9
PAINLEVEL_OUTOF10: 7
PAINLEVEL_OUTOF10: 8

## 2019-06-11 ASSESSMENT — PAIN DESCRIPTION - DESCRIPTORS
DESCRIPTORS: ACHING;DISCOMFORT;SORE
DESCRIPTORS: ACHING;DISCOMFORT;SORE;TENDER
DESCRIPTORS: ACHING;DISCOMFORT;SORE
DESCRIPTORS: ACHING;DISCOMFORT;SORE

## 2019-06-11 ASSESSMENT — PAIN DESCRIPTION - PAIN TYPE
TYPE: ACUTE PAIN;SURGICAL PAIN
TYPE: ACUTE PAIN;SURGICAL PAIN
TYPE: SURGICAL PAIN;ACUTE PAIN
TYPE: ACUTE PAIN;SURGICAL PAIN

## 2019-06-11 ASSESSMENT — PAIN DESCRIPTION - ONSET: ONSET: ON-GOING

## 2019-06-11 ASSESSMENT — PAIN DESCRIPTION - FREQUENCY
FREQUENCY: CONTINUOUS
FREQUENCY: CONTINUOUS

## 2019-06-11 NOTE — PLAN OF CARE
Problem: Falls - Risk of:  Goal: Will remain free from falls  Description  Will remain free from falls  Outcome: Met This Shift     Problem: Pain:  Goal: Pain level will decrease  Description  Pain level will decrease  Outcome: Met This Shift     Problem: Fluid Volume - Imbalance:  Goal: Absence of imbalanced fluid volume signs and symptoms  Description  Absence of imbalanced fluid volume signs and symptoms  Outcome: Met This Shift     Problem: Skin Integrity - Impaired:  Goal: Will show no infection signs and symptoms  Description  Will show no infection signs and symptoms  Outcome: Met This Shift

## 2019-06-11 NOTE — PLAN OF CARE
Problem: Falls - Risk of:  Goal: Will remain free from falls  Description  Will remain free from falls  Outcome: Met This Shift     Problem: Pain:  Goal: Pain level will decrease  Description  Pain level will decrease  Outcome: Met This Shift     Problem: Fluid Volume - Imbalance:  Goal: Absence of imbalanced fluid volume signs and symptoms  Description  Absence of imbalanced fluid volume signs and symptoms  Outcome: Met This Shift     Problem: Skin Integrity - Impaired:  Goal: Will show no infection signs and symptoms  Description  Will show no infection signs and symptoms  Outcome: Met This Shift     Problem: Risk for Impaired Skin Integrity  Goal: Tissue integrity - skin and mucous membranes  Description  Structural intactness and normal physiological function of skin and  mucous membranes.   Outcome: Met This Shift

## 2019-06-11 NOTE — PROGRESS NOTES
Increased activity tolerance with no desat and tolerating activity >5 minutes. Completed ambulation around room with multiple turns d/t having to work within length of lines. Will continue to progress once back to nasal cannula. Anticipate continued recovery to independence. All needs met in chair. Patient is making fair progress toward established physical therapy goals. Continue with physical therapy current plan of care.       Time in: 0976  Time out: [address] [facility], , [place]  [id number]

## 2019-06-11 NOTE — PROGRESS NOTES
Occupational Therapy  OT BEDSIDE TREATMENT NOTE                       Date:2019  Patient Name: Ivy Zeng  MRN: [id number]  : 1970  [facility]      Re-evaluating OT: Keyona Sauceda OTR/L #[id number]      OT re-evaluation completed following ex-lap, spleenectomy      AM-PAC Daily Activity Raw Score:      Recommended Adaptive Equipment: TBD:  Reacher, bathroom DME prn     Diagnosis: Hematoma spleen                      Cervical strain     Patient presented to ED with following fall from ladder     Surgery: 19 emergent ex lap, spleenectomy     Pertinent Medical History:   GERD     Precautions:  Falls, cervical collar, cervical precautions, abdominal precautions, O2/optiflow     Home Living: Pt lives with wife in a 2nd floor apartment with 4 step(s) to enter and 1 rail(s); >10 steps and 1rail to get to 2nd floor; bed/bath on 1 floor. Bathroom setup: Pt using tub/ shower; standard toilet  Equipment owned: none     Prior Level of Function: Indep with ADLs; Indep with IADLs; using no device for ambulation. Driving: yes  Occupation: yes construction; laborious job, requires heavy lifting     Pain Level: 7/10 abdominal pain and heartburn at bed level. Educated on proper upright bed level positioning and increase OOB activity. RN present and provided medications.     Cognition: A&O: 4/4; Follows multi- step directions with VC for decreased attn to task. Communication: WFL              Memory: F+              Sequencing: F              Problem solving: F              Judgement/safety: F cues for proper breathing techniques for pain mgmt and to maintain upright posture.  Reminders to use call light for OOB activity        Functional Assessment:    Initial Eval Status  Date: 19 Treatment Status  Date: 19 Short Term Goals  Treatment frequency: PRN   Feeding Independent        Grooming Stand by Assist   SBA  Standing at elevated tray table to brush teeth ~3 minutes Modified [place]    UB Dressing Stand by Assist   SBA   seated EOB changing gown with max VC for pacing and proper breathing techniques for pain mgmt  Per last tx session Modified [place]    LB Dressing Maximal Assist      Cuing on modified techniques within precautions  SBA  Doff pants with reacher education to decrease bending; thread BLE into shorts with VC on modified techniques for abdominal precautions. SBA clothing mgmt. Socks per last tx session SBA in arm chair. Modified [place]    Bathing Moderate Assist Min A  simulation Modified [place]    Toileting Minimal Assist  Min A  simulation Modified [place]    Bed Mobility  Supine to sit: Stand by Assist   Sit to supine: Stand by Assist  Supine to sit: Stand by Assist   Sit to supine: Stand by Assist   Supine to sit: Modified [place]   Sit to supine: Modified [place]    Functional Transfers Stand by Assist   SBA: sit <> stand from EOB, arm chair      SBA:  Stand pivot from EOB > arm chair Modified [place]    Functional Mobility Minimal Assist      Ambulated in room and hallway without AD;  + unsteadiness, cuing for pursed lip breathing (desaturated to 82% on 2L)  SBA  at bedside no device limited by lines Modified [place]    Balance Sitting:     Static:  SBA    Dynamic:SBA  Standing: min A  Sitting:     Static:  Sup    Dynamic:SBA  Standing: SBA<>min A     Activity Tolerance F- F  On optiflow, standing tolerance 5 minutes G   Visual/  Perceptual wfl     wfl                     Hand dominance: right       Strength ROM Additional Info:    RUE  Wfl, formal MMT deferred due to cervical / abdominal precautions  wfl good  and wfl FMC/dexterity noted during ADL tasks      LUE Wfl, formal MMT deferred due to cervical / abdominal precautions  wfl good  and wfl FMC/dexterity noted during ADL tasks      Hearing:  WFL  Sensation: WFL  Tone: WFL  Edema:none noted       Comments: Ok from RN to see pt.   Upon arrival pt supine in bed and agreeable to session with PT collaboration. Vitals monitored continuously during session. After bed mobility, pt performed STS and tolerated static standing at bedside 2-3 minutes with focus on upright posture, scapular retraction and proper breathing techniques. Pt reports decreased pain throughout abdomen when standing upright. Pt transferred to arm chair for completion of LB dressing as stated above. SpO2 monitored and maintained above 95% on optiflow. Pt then ambulated at bedside as stated above; seated rest break followed by standing for grooming tasks as stated above to improve endurance for OOB activity. Pt agreeable to sit up in chair end of session with all devices within reach, all lines and tubes intact. t. Prior to and at the end of session, environmental modifications/line management completed for patients safety and efficiency of treatment session. RN notified of pt status- recommend BSC.      Pt required cues and education as noted above for safe facilitation and completion of tasks. During functional activites and ADLs pt additionally educated on posture, energy conservation/pacing/breathing techniques, OT POC/progression, OT role, importance of increasing OOB activity with staff assist d/t multiple lines, completing ADL tasks daily as IND as possible to aide in recovery process, fall risk precautions/call light use. Therapist provided skilled monitoring of HR, O2 saturation, blood pressure and patients response during treatment session.       Vitals:   BP at rest: 118/79 BP at end of session: 122/78   HR at rest: 88 bpm HR at end of session: 96 bpm   Spo2 at rest: 98% optiflow  Spo2 at end of session: 97% optiflow      · Pt has made good progress towards set goals.    · Continue with current plan of care     Time in: 09:35  Time out: 10:13  Total Tx Time: 38 minutes     [facility], OTR/L [id number]

## 2019-06-11 NOTE — PROGRESS NOTES
Pharmacy Consultation Note  (Antibiotic Dosing and Monitoring)    Initial consult date: 6//10/19  Consulting physician: Dr Edie Adair  Drug(s): Vancomycin    Vancomycin was discontinued today. Therefore, will sign off at this time. Please re-consult if needed.       Dimitri Duggan PharmD, BCPS, BCCCP  6/11/2019  12:44 PM  Pager: [phone]

## 2019-06-11 NOTE — PROGRESS NOTES
Surgical Intensive Care Unit   Daily Progress Note     Date of admission: 5/31/2019    Reason for ICU: hypoxia 2/2 Nosocomial PNA, fall 10 ft, rib fx, grade 3 splenic hematoma, hemoperitoneum s/p POD6 splenectomy, colon serosal tear rpr 6/5, C spine ligamentous injury    Pertinent Hospital Course Events:   5/31-- Joleen Patel a 50 y. o. male who presents from home for evaluation of fall from 10 feet. Patient fell on his left side, hit his head and denies LOC. He is not on [address]. He is having sharp pain in his left flank, shoulder and midline in his neck.   CT scan demonstrates left rib fracture with splenic laceration and free intraabdominal blood without blush. Admitted to SICU for hemodynamic and H&H monitoring   6/1--nothing new overnight  6/2--no new issues  6/3-- no issues overnight  6/4 Transferred from SICU to general floor. Continues to complain of cervical spine pain. 6/5 MRI cervical spine showed ligamentous injury. Neurosurgery consulted. Custom cervical collar ordered  6/6: Hypotensive and pale, STAT CT abdomen/pelvis demonstrated a rebleeding of splenic laceration, s/p EX-lap with splenectomy and repair or colonic serosal tears. Pain control moderately controlled this AM. Awaiting bowel function. 6/7: Pain controlled. No return of bowel function. Voiding s/p Gardiner removal.  6/8: Continue no bowel function, NPO, +OOB. Received 1 U PRBC yesterday  6/9: +flatus, tachypneic though not SOB, stated on CLD, saline locked   6/10: worsening hypoxia, transferred to SICU. CTA showed pneumonia, no PE. Started on vanc, cefepime for HCAP  6/11: procal 10, transfuse 1U PRBC    Overnight Events: no acute events, weaning O2; off optiflow    Subjective: Pain with coughing surrounding abdominal incision and left ribs unchanged compared to previous.      Physical Exam:   /71   Pulse 92   Temp 99.3 °F (37.4 °C) (Temporal)   Resp 14   Ht 6' (1.829 m)   Wt 181 lb 9.6 oz (82.4 kg)   SpO2 100%   BMI 24.63 kg/m²     I/O last 3 completed shifts: In: 1052 [I.V.:502; IV Piggyback:550]  Out: 3892 [Urine:2925]  I/O this shift:  In: 340 [P.O.:340]  Out: -     General: No apparent distress, comfortable, awake, appropriate  HEENT: Custom C collar in place, Trachea midline, no masses, Pupils equal round  Chest: coarse breath sounds b/l, Respiratory effort was normal with no retractions or use of accessory muscles, lidocaine patch left ribs, SMI 1250  Cardiovascular: Heart sounds were normal with a regular rate  Abdomen:  Soft with mildly distended.   Appropriate tenderness without guarding, rebound, or rigidity; serosanguinous drainage from incision, slight erythema  Extremities: Moves all 4 extremeties, No pedal edema      Assessment/Plan:     Neuro: C spine ligamentous injury - C collar   pain control -Robaxin and prn meds  CV: Tobacco use - on nicoderm  Pulm: Acute Resp Failure 2/2 trauma and PNA   Pulm toilet: Encourage IS - SMI 1250 this AM, coughing and deep breathing   On opti-flow heated HFNC - wean O2  GI: Grade 3 spleninc lac with rebleed s/p splenectomy   clear liquid diet, advance as tolerated, on bowel regimen   ID consult for vaccines for encapsulated bacteria prior to d/c   More purulent material from wound - consider removing staples and checking for dehiscence; check procalcitonin  Constipation - passing gas, but no BM yet, continue bowel regimen  Renal: Creatinine within normal limits, no edema indicating fluid overload  ID: afebrile, continue cefepime but stop vanc after today for HCAP, strep and legionella neg, UA WNL, SpCx OPF  Endo: glucose near normal range, replete lytes prn no acute issues  MSK: no acute issues  Heme: Anemia 2/2 blood loss from surgery, transfuse for hgb <7:1U today; leukocytosis and thrombocytosis most likely 2/2 to spolenectomy; however could also be due to surgical wound infection - see above; SCDs, lovenox      Code status:  Full Code    Disposition:  ICU MGMT    Electronically signed by Citlali Corley DO on 6/11/2019 at 11:28 AM

## 2019-06-11 NOTE — PROGRESS NOTES
[facility]  Surgical Intensive Care Unit  Daily Progress Note        MECHANISM OF INJURY:  Fall 10'    INJURIES:  Patient Active Problem List   Diagnosis    Chest pain    Tobacco use    Atypical chest pain    Cocaine use    Nodule of left lung    Numbness and tingling of right lower extremity    Unstable angina (HCC)    Leukocytosis    Hep C w/o coma, chronic (HCC)    Hematoma of spleen, closed, initial encounter    Traumatic hemoperitoneum    Closed traumatic minimally displaced fracture of one rib of left side    Acute blood loss anemia    Laceration of spleen    Multiple closed fractures of ribs of left side       Hospital Course/Procedures/Operation/treatments:   5/31-- Lucy Morning a 50 y. o. male who presents from home for evaluation of fall from 10 feet. Patient fell on his left side, hit his head and denies LOC. He is not on [address]. He is having sharp pain in his left flank, shoulder and midline in his neck.   CT scan demonstrates left rib fracture with splenic laceration and free intraabdominal blood without blush. Admitted to SICU for hemodynamic and H&H monitoring   6/1--nothing new overnight  6/2--no new issues  6/3-- no issues overnight  6/4 Transferred from SICU to general floor. Continues to complain of cervical spine pain. 6/5 MRI cervical spine showed ligamentous injury. Neurosurgery consulted. Custom cervical collar ordered  6/6: Hypotensive and pale, STAT CT abdomen/pelvis demonstrated a rebleeding of splenic laceration, s/p EX-lap with splenectomy and repair or colonic serosal tears. Pain control moderately controlled this AM. Awaiting bowel function. 6/7: Pain controlled. No return of bowel function. Voiding s/p Gardiner removal.  6/8: Continue no bowel function, NPO, +OOB.  Received 1 U PRBC yesterday  6/9: +flatus, tachypneic though not SOB, stated on CLD, saline locked   6/10: transferred to SICU for hypoxia, CTA negative for PE, optiflow started, abx for PNA  6/11:  procalcitonin 10, transfuse 1 U PRBC      OVERNIGHT EVENTS:   Weaned off optiflow      PHYSICAL EXAM:  CONSTITUTIONAL:  Awake, appropriate  EYES:  PERRL  LUNGS:  Coarse and shallow BS bilaterally, SMI 1250  CARDIOVASCULAR:  RRR  ABDOMEN:  Soft, mild distention, mild incisional TTP, wound--serosanguinous drainage; slight erythema  MUSCULOSKELETAL:  LIZARRAGA x 4  NEUROLOGIC:  GCS 15      PROBLEMS/PLANS:  1.  Grade 3 splenic laceration with rebleed--s/p splenectomy  --ID c/s for vaccines prior to d/c  --monitor H/H  2. Acute respiratory failure d/t trauma/pneumonia  --IS, cough, deep breathing  --PEP flutter  --duonebs  --CTA negative  3. Wound drainage  --monitor output, if continues, will need staples removed to check for dehiscence  --CT A/P   4. Constipation  --dulcolax  --increase bowel reg  5. Tobacco abuse  --nicoderm wean  6. Cspine ligamentous injury  --NSG following  --collar when OOB  7. HCAP  --vanco (stop 6/11), cefepime  --sputum culture--OPF      PROPHYLAXIS:   Stress ulcer: diet   VTE: SCDs, lovenox      DISPOSITION:   Continue ICU    CC TIME:  I spent 35 min managing this patients critical issues which included acute respiratory failure, wound complications, pneumonia excluding time teaching and performing procedures.         Ivis Snow MD  6/11/2019  3:28 PM

## 2019-06-11 NOTE — PROGRESS NOTES
PELVIS W IV CONTRAST Dosage: 1184 mGY-cm Contrast: 110 mL Isovue-370 vertebral body height in the thoracic spine is normal. INDICATION:  left chest pain, fell 12 feet  COMPARISON: None FINDINGS:  Vertebral body height in the thoracic spine is normal. There is a subcutaneous nodule medially in the right hemithorax. Thoracic aorta is normal. Heart size is normal. There is no hilar or mediastinal lymph nodes. There are small left axillary nodes. There is borderline hyperinflation in the upper lungs. There is minimal scarring in the right middle lobe and left lung base. No obvious thoracic fractures are noted. There is a moderate amount of upper abdominal ascites/fluid. Liver,pancreas, and kidneys are normal aside from an upper pole right renal cyst. There is a small lower pole left renal cyst. Spleen has a couple small low-attenuation zones on its periphery (series 503 image 53). Considering the amount pelvic fluid/hemorrhage findings are suspicious of a subtle splenic laceration. There is a significant amount of pelvic ascites. The density measures 61 Hounsfield. Bladder appears normal. No pelvic fractures are identified. Small to moderate amount of upper abdominal ascites/fluid and significant amount of pelvic fluid. The density of this has Hounsfield units measuring 61, and is most compatible with hemorrhage. There are some ill-defined subtle densities in the posterior aspect of the spleen. Findings are concerning for a subtle splenic laceration. No other visceral organ abnormality is identified.  CRITICAL FINDING: Results were called and read back to the emergency room on 2019 at 1430 hours    Ct Cervical Spine Wo Contrast    Result Date: 2019  Patient MRN:  [id number] : 1970 Age: 50 years Gender: Male Order Date:  2019 12:45 PM EXAM: CT CERVICAL SPINE WO CONTRAST NUMBER OF IMAGES:  732 INDICATION:  NECK PAIN FOLLOWING TRAUMA COMPARISON: None Multiple CT sections were obtained with sagittal and coronal MPR reconstructions. Technique: Low-dose CT  acquisition technique included one of following options; 1 . Automated exposure control, 2. Adjustment of MA and or KV according to patient's size or 3. Use of iterative reconstruction. Images reveal the vertebral bodies, their disc spaces, and the posterior facet joints to appear to be intact. There are severe degenerative changes of the spine present. There are severe degenerative changes of the facets. .    Degenerative changes    Ct Abdomen Pelvis W Iv Contrast Additional Contrast? None    Result Date: 2019  Patient MRN:  [id number] : 1970 Age: 50 years Gender: Male Order Date:  2019 12:45 PM EXAM: CT CHEST W CONTRAST, CT ABDOMEN PELVIS W IV CONTRAST Dosage: 1184 mGY-cm Contrast: 110 mL Isovue-370 vertebral body height in the thoracic spine is normal. INDICATION:  left chest pain, fell 12 feet  COMPARISON: None FINDINGS:  Vertebral body height in the thoracic spine is normal. There is a subcutaneous nodule medially in the right hemithorax. Thoracic aorta is normal. Heart size is normal. There is no hilar or mediastinal lymph nodes. There are small left axillary nodes. There is borderline hyperinflation in the upper lungs. There is minimal scarring in the right middle lobe and left lung base. No obvious thoracic fractures are noted. There is a moderate amount of upper abdominal ascites/fluid. Liver,pancreas, and kidneys are normal aside from an upper pole right renal cyst. There is a small lower pole left renal cyst. Spleen has a couple small low-attenuation zones on its periphery (series 503 image 53). Considering the amount pelvic fluid/hemorrhage findings are suspicious of a subtle splenic laceration. There is a significant amount of pelvic ascites. The density measures 61 Hounsfield. Bladder appears normal. No pelvic fractures are identified.      Small to moderate amount of upper abdominal ascites/fluid and significant amount of pelvic fluid. The density of this has Hounsfield units measuring 61, and is most compatible with hemorrhage. There are some ill-defined subtle densities in the posterior aspect of the spleen. Findings are concerning for a subtle splenic laceration. No other visceral organ abnormality is identified.  CRITICAL FINDING: Results were called and read back to the emergency room on 5/31/2019 at 1430 hours    CBC:   Lab Results   Component Value Date    WBC 28.2 06/11/2019    RBC 2.36 06/11/2019    HGB 6.8 06/11/2019    HCT 21.1 06/11/2019    MCV 89.4 06/11/2019    MCH 28.8 06/11/2019    MCHC 32.2 06/11/2019    RDW 14.4 06/11/2019     06/11/2019    MPV 9.5 06/11/2019     BMP:    Lab Results   Component Value Date     06/11/2019    K 3.5 06/11/2019    K 4.5 06/05/2019     06/11/2019    CO2 23 06/11/2019    BUN 13 06/11/2019    LABALBU 2.7 06/11/2019    CREATININE 0.9 06/11/2019    CALCIUM 8.0 06/11/2019    GFRAA >60 06/11/2019    LABGLOM >60 06/11/2019    GLUCOSE 99 06/11/2019      sodium chloride  250 mL Intravenous Once    lactulose  20 g Oral BID    sodium chloride (Inhalant)  4 mL Nebulization Q4H WA    nicotine  1 patch Transdermal Daily    Followed by   Nilda Bull ON 6/17/2019] nicotine  1 patch Transdermal Daily    ipratropium-albuterol  1 ampule Inhalation Q4H WA    cefepime  2 g Intravenous Q8H    bisacodyl  10 mg Rectal Daily    lidocaine  1 patch Transdermal Daily    methocarbamol  1,500 mg Oral 4x Daily    enoxaparin  30 mg Subcutaneous BID    docusate sodium  100 mg Oral BID    senna  1 tablet Oral Nightly    sodium chloride flush  10 mL Intravenous 2 times per day    bacitracin zinc   Topical TID    acetaminophen  650 mg Oral Q4H    atorvastatin  40 mg Oral Nightly     Awake and alert, collar in place  Assessment:  Patient Active Problem List   Diagnosis    Chest pain    Tobacco use    Atypical chest pain    Cocaine use    Nodule of left lung    Numbness and tingling of right lower extremity    Unstable angina (HCC)    Leukocytosis    Hep C w/o coma, chronic (HCC)    Hematoma of spleen, closed, initial encounter    Traumatic hemoperitoneum    Closed traumatic minimally displaced fracture of one rib of left side    Acute blood loss anemia    Laceration of spleen    Multiple closed fractures of ribs of left side     Plan:Continue current care  Sis Tabares M.D.

## 2019-06-12 PROBLEM — E43 SEVERE PROTEIN-CALORIE MALNUTRITION (HCC): Status: ACTIVE | Noted: 2019-06-12

## 2019-06-12 LAB
ALBUMIN SERPL-MCNC: 2.5 G/DL (ref 3.5–5.2)
ALP BLD-CCNC: 101 U/L (ref 40–129)
ALT SERPL-CCNC: 16 U/L (ref 0–40)
ANION GAP SERPL CALCULATED.3IONS-SCNC: 13 MMOL/L (ref 7–16)
AST SERPL-CCNC: 27 U/L (ref 0–39)
BILIRUB SERPL-MCNC: 0.3 MG/DL (ref 0–1.2)
BUN BLDV-MCNC: 10 MG/DL (ref 6–20)
CALCIUM SERPL-MCNC: 8.5 MG/DL (ref 8.6–10.2)
CHLORIDE BLD-SCNC: 104 MMOL/L (ref 98–107)
CO2: 21 MMOL/L (ref 22–29)
CREAT SERPL-MCNC: 0.9 MG/DL (ref 0.7–1.2)
CULTURE, RESPIRATORY: NORMAL
GFR AFRICAN AMERICAN: >60
GFR NON-AFRICAN AMERICAN: >60 ML/MIN/1.73
GLUCOSE BLD-MCNC: 91 MG/DL (ref 74–99)
HCT VFR BLD CALC: 28.6 % (ref 37–54)
HEMOGLOBIN: 9.2 G/DL (ref 12.5–16.5)
MCH RBC QN AUTO: 28.6 PG (ref 26–35)
MCHC RBC AUTO-ENTMCNC: 32.2 % (ref 32–34.5)
MCV RBC AUTO: 88.8 FL (ref 80–99.9)
PDW BLD-RTO: 14.8 FL (ref 11.5–15)
PLATELET # BLD: 745 E9/L (ref 130–450)
PMV BLD AUTO: 9.5 FL (ref 7–12)
POTASSIUM SERPL-SCNC: 4.5 MMOL/L (ref 3.5–5)
RBC # BLD: 3.22 E12/L (ref 3.8–5.8)
SMEAR, RESPIRATORY: NORMAL
SODIUM BLD-SCNC: 138 MMOL/L (ref 132–146)
TOTAL PROTEIN: 6.7 G/DL (ref 6.4–8.3)
WBC # BLD: 20 E9/L (ref 4.5–11.5)

## 2019-06-12 PROCEDURE — 6370000000 HC RX 637 (ALT 250 FOR IP): Performed by: STUDENT IN AN ORGANIZED HEALTH CARE EDUCATION/TRAINING PROGRAM

## 2019-06-12 PROCEDURE — 36415 COLL VENOUS BLD VENIPUNCTURE: CPT

## 2019-06-12 PROCEDURE — 97530 THERAPEUTIC ACTIVITIES: CPT

## 2019-06-12 PROCEDURE — 6360000002 HC RX W HCPCS: Performed by: STUDENT IN AN ORGANIZED HEALTH CARE EDUCATION/TRAINING PROGRAM

## 2019-06-12 PROCEDURE — 94640 AIRWAY INHALATION TREATMENT: CPT

## 2019-06-12 PROCEDURE — 99291 CRITICAL CARE FIRST HOUR: CPT | Performed by: SURGERY

## 2019-06-12 PROCEDURE — 6370000000 HC RX 637 (ALT 250 FOR IP): Performed by: SURGERY

## 2019-06-12 PROCEDURE — 2700000000 HC OXYGEN THERAPY PER DAY

## 2019-06-12 PROCEDURE — 2580000003 HC RX 258: Performed by: STUDENT IN AN ORGANIZED HEALTH CARE EDUCATION/TRAINING PROGRAM

## 2019-06-12 PROCEDURE — 85027 COMPLETE CBC AUTOMATED: CPT

## 2019-06-12 PROCEDURE — 80053 COMPREHEN METABOLIC PANEL: CPT

## 2019-06-12 PROCEDURE — 2580000003 HC RX 258: Performed by: RADIOLOGY

## 2019-06-12 PROCEDURE — 97535 SELF CARE MNGMENT TRAINING: CPT

## 2019-06-12 PROCEDURE — 1200000000 HC SEMI PRIVATE

## 2019-06-12 RX ORDER — CALCIUM CARBONATE 200(500)MG
500 TABLET,CHEWABLE ORAL 3 TIMES DAILY PRN
Status: DISCONTINUED | OUTPATIENT
Start: 2019-06-12 | End: 2019-06-14 | Stop reason: HOSPADM

## 2019-06-12 RX ADMIN — OXYCODONE HYDROCHLORIDE 10 MG: 10 TABLET ORAL at 12:39

## 2019-06-12 RX ADMIN — OXYCODONE HYDROCHLORIDE 10 MG: 10 TABLET ORAL at 21:16

## 2019-06-12 RX ADMIN — OXYCODONE HYDROCHLORIDE 10 MG: 10 TABLET ORAL at 08:39

## 2019-06-12 RX ADMIN — Medication 10 ML: at 16:11

## 2019-06-12 RX ADMIN — ACETAMINOPHEN 650 MG: 325 TABLET, FILM COATED ORAL at 14:59

## 2019-06-12 RX ADMIN — CEFEPIME 2 G: 2 INJECTION, POWDER, FOR SOLUTION INTRAVENOUS at 01:31

## 2019-06-12 RX ADMIN — IPRATROPIUM BROMIDE AND ALBUTEROL SULFATE 1 AMPULE: .5; 3 SOLUTION RESPIRATORY (INHALATION) at 16:00

## 2019-06-12 RX ADMIN — ACETAMINOPHEN 650 MG: 325 TABLET, FILM COATED ORAL at 05:57

## 2019-06-12 RX ADMIN — SODIUM CHLORIDE SOLN NEBU 3% 4 ML: 3 NEBU SOLN at 08:04

## 2019-06-12 RX ADMIN — ACETAMINOPHEN 650 MG: 325 TABLET, FILM COATED ORAL at 10:35

## 2019-06-12 RX ADMIN — IPRATROPIUM BROMIDE AND ALBUTEROL SULFATE 1 AMPULE: .5; 3 SOLUTION RESPIRATORY (INHALATION) at 08:04

## 2019-06-12 RX ADMIN — METHOCARBAMOL TABLETS 1500 MG: 750 TABLET, COATED ORAL at 21:20

## 2019-06-12 RX ADMIN — Medication: at 14:59

## 2019-06-12 RX ADMIN — IPRATROPIUM BROMIDE AND ALBUTEROL SULFATE 1 AMPULE: .5; 3 SOLUTION RESPIRATORY (INHALATION) at 19:50

## 2019-06-12 RX ADMIN — METHOCARBAMOL TABLETS 1500 MG: 750 TABLET, COATED ORAL at 16:39

## 2019-06-12 RX ADMIN — WATER 2 G: 1 INJECTION INTRAMUSCULAR; INTRAVENOUS; SUBCUTANEOUS at 16:11

## 2019-06-12 RX ADMIN — ENOXAPARIN SODIUM 30 MG: 30 INJECTION SUBCUTANEOUS at 08:25

## 2019-06-12 RX ADMIN — LACTULOSE 20 G: 20 SOLUTION ORAL at 08:25

## 2019-06-12 RX ADMIN — SODIUM CHLORIDE SOLN NEBU 3% 4 ML: 3 NEBU SOLN at 11:29

## 2019-06-12 RX ADMIN — IPRATROPIUM BROMIDE AND ALBUTEROL SULFATE 1 AMPULE: .5; 3 SOLUTION RESPIRATORY (INHALATION) at 11:28

## 2019-06-12 RX ADMIN — DOCUSATE SODIUM 100 MG: 100 CAPSULE, LIQUID FILLED ORAL at 08:24

## 2019-06-12 RX ADMIN — Medication: at 08:24

## 2019-06-12 RX ADMIN — OXYCODONE HYDROCHLORIDE 10 MG: 10 TABLET ORAL at 04:39

## 2019-06-12 RX ADMIN — MOMETASONE FUROATE AND FORMOTEROL FUMARATE DIHYDRATE 2 PUFF: 100; 5 AEROSOL RESPIRATORY (INHALATION) at 19:50

## 2019-06-12 RX ADMIN — ATORVASTATIN CALCIUM 40 MG: 40 TABLET, FILM COATED ORAL at 21:16

## 2019-06-12 RX ADMIN — Medication 10 ML: at 08:26

## 2019-06-12 RX ADMIN — CEFEPIME 2 G: 2 INJECTION, POWDER, FOR SOLUTION INTRAVENOUS at 08:25

## 2019-06-12 RX ADMIN — Medication 10 ML: at 21:16

## 2019-06-12 RX ADMIN — METHOCARBAMOL TABLETS 1500 MG: 750 TABLET, COATED ORAL at 08:24

## 2019-06-12 RX ADMIN — OXYCODONE HYDROCHLORIDE 10 MG: 10 TABLET ORAL at 16:39

## 2019-06-12 RX ADMIN — METHOCARBAMOL TABLETS 1500 MG: 750 TABLET, COATED ORAL at 12:36

## 2019-06-12 ASSESSMENT — PAIN DESCRIPTION - PAIN TYPE
TYPE: ACUTE PAIN;SURGICAL PAIN
TYPE: SURGICAL PAIN
TYPE: ACUTE PAIN;SURGICAL PAIN

## 2019-06-12 ASSESSMENT — PAIN DESCRIPTION - PROGRESSION
CLINICAL_PROGRESSION: GRADUALLY WORSENING
CLINICAL_PROGRESSION: NOT CHANGED

## 2019-06-12 ASSESSMENT — PAIN SCALES - GENERAL
PAINLEVEL_OUTOF10: 8
PAINLEVEL_OUTOF10: 10
PAINLEVEL_OUTOF10: 0
PAINLEVEL_OUTOF10: 8
PAINLEVEL_OUTOF10: 5
PAINLEVEL_OUTOF10: 8
PAINLEVEL_OUTOF10: 0
PAINLEVEL_OUTOF10: 8
PAINLEVEL_OUTOF10: 0
PAINLEVEL_OUTOF10: 0

## 2019-06-12 ASSESSMENT — PAIN DESCRIPTION - ONSET
ONSET: AWAKENED FROM SLEEP
ONSET: ON-GOING
ONSET: GRADUAL
ONSET: ON-GOING
ONSET: ON-GOING

## 2019-06-12 ASSESSMENT — PAIN DESCRIPTION - ORIENTATION
ORIENTATION: LEFT;MID
ORIENTATION: LEFT;MID
ORIENTATION: MID
ORIENTATION: LEFT;MID
ORIENTATION: MID

## 2019-06-12 ASSESSMENT — PAIN DESCRIPTION - FREQUENCY
FREQUENCY: INTERMITTENT
FREQUENCY: INTERMITTENT
FREQUENCY: CONTINUOUS
FREQUENCY: INTERMITTENT
FREQUENCY: CONTINUOUS

## 2019-06-12 ASSESSMENT — PAIN - FUNCTIONAL ASSESSMENT
PAIN_FUNCTIONAL_ASSESSMENT: PREVENTS OR INTERFERES SOME ACTIVE ACTIVITIES AND ADLS

## 2019-06-12 ASSESSMENT — PAIN DESCRIPTION - DESCRIPTORS
DESCRIPTORS: ACHING;DISCOMFORT
DESCRIPTORS: ACHING;CONSTANT;CRAMPING
DESCRIPTORS: ACHING;DISCOMFORT;SORE;TENDER
DESCRIPTORS: ACHING;DISCOMFORT;CRAMPING
DESCRIPTORS: ACHING;DISCOMFORT

## 2019-06-12 ASSESSMENT — PAIN DESCRIPTION - LOCATION
LOCATION: ABDOMEN

## 2019-06-12 NOTE — PROGRESS NOTES
Neurosurg progress note  VITALS:  BP (!) 110/56   Pulse 70   Temp 99.1 °F (37.3 °C) (Temporal)   Resp 16   Ht 6' (1.829 m)   Wt 181 lb 9.6 oz (82.4 kg)   SpO2 99%   BMI 24.63 kg/m²   24HR INTAKE/OUTPUT:    Intake/Output Summary (Last 24 hours) at 2019 0811  Last data filed at 2019 4534  Gross per 24 hour   Intake 2300 ml   Output 5000 ml   Net -2700 ml     Xr Chest Standard (2 Vw)    Result Date: 2019  Patient MRN:  [id number] : 1970 Age: 50 years Gender: Male Order Date:  2019 12:45 PM EXAM: XR CHEST (2 VW) 3 images INDICATION:  dyspnea, fall dyspnea, fall COMPARISON: 2019 FINDINGS: The heart and the mediastinal structures are normal. There is mild COPD. There is minimal infiltrate/atelectasis in the right lung base. There is no pneumothorax. There is gastric distention. COPD with minimal atelectasis/infiltrates in the right lung base. Ct Head Wo Contrast    Result Date: 2019  Patient MRN:  [id number] : 1970 Age: 50 years Gender: Male Order Date:  2019 12:45 PM EXAM: CT HEAD WO CONTRAST NUMBER OF IMAGES:  [id number] INDICATION:  HEAD TRAUMA, CLOSED, MILD, GCS >= 13, NO RISK FACTORS, NEURO EXAM NORMAL COMPARISON: None Technique: Low-dose CT  acquisition technique included one of following options; 1 . Automated exposure control, 2. Adjustment of MA and or KV according to patient's size or 3. Use of iterative reconstruction. Multiple CT sections were obtained with sagittal and coronal MPR reconstructions. The ventricles are unremarkable. The gyri and sulci appear unremarkable. The white matter appears unremarkable. There is no evidence for hemorrhage. There is no infarct identified. There is no mass effect identified. There is no mass identified. Unremarkable scan of the head.      Ct Chest W Contrast    Result Date: 2019  Patient MRN:  [id number] : 1970 Age: 50 years Gender: Male Order Date:  2019 12:45 PM EXAM: CT CHEST W CONTRAST, CT ABDOMEN PELVIS W IV CONTRAST Dosage: 1184 mGY-cm Contrast: 110 mL Isovue-370 vertebral body height in the thoracic spine is normal. INDICATION:  left chest pain, fell 12 feet  COMPARISON: None FINDINGS:  Vertebral body height in the thoracic spine is normal. There is a subcutaneous nodule medially in the right hemithorax. Thoracic aorta is normal. Heart size is normal. There is no hilar or mediastinal lymph nodes. There are small left axillary nodes. There is borderline hyperinflation in the upper lungs. There is minimal scarring in the right middle lobe and left lung base. No obvious thoracic fractures are noted. There is a moderate amount of upper abdominal ascites/fluid. Liver,pancreas, and kidneys are normal aside from an upper pole right renal cyst. There is a small lower pole left renal cyst. Spleen has a couple small low-attenuation zones on its periphery (series 503 image 53). Considering the amount pelvic fluid/hemorrhage findings are suspicious of a subtle splenic laceration. There is a significant amount of pelvic ascites. The density measures 61 Hounsfield. Bladder appears normal. No pelvic fractures are identified. Small to moderate amount of upper abdominal ascites/fluid and significant amount of pelvic fluid. The density of this has Hounsfield units measuring 61, and is most compatible with hemorrhage. There are some ill-defined subtle densities in the posterior aspect of the spleen. Findings are concerning for a subtle splenic laceration. No other visceral organ abnormality is identified.  CRITICAL FINDING: Results were called and read back to the emergency room on 2019 at 1430 hours    Ct Cervical Spine Wo Contrast    Result Date: 2019  Patient MRN:  [id number] : 1970 Age: 50 years Gender: Male Order Date:  2019 12:45 PM EXAM: CT CERVICAL SPINE WO CONTRAST NUMBER OF IMAGES:  732 INDICATION:  NECK PAIN FOLLOWING TRAUMA COMPARISON: None Multiple CT sections were obtained with sagittal and coronal MPR reconstructions. Technique: Low-dose CT  acquisition technique included one of following options; 1 . Automated exposure control, 2. Adjustment of MA and or KV according to patient's size or 3. Use of iterative reconstruction. Images reveal the vertebral bodies, their disc spaces, and the posterior facet joints to appear to be intact. There are severe degenerative changes of the spine present. There are severe degenerative changes of the facets. .    Degenerative changes    Ct Abdomen Pelvis W Iv Contrast Additional Contrast? None    Result Date: 2019  Patient MRN:  [id number] : 1970 Age: 50 years Gender: Male Order Date:  2019 12:45 PM EXAM: CT CHEST W CONTRAST, CT ABDOMEN PELVIS W IV CONTRAST Dosage: 1184 mGY-cm Contrast: 110 mL Isovue-370 vertebral body height in the thoracic spine is normal. INDICATION:  left chest pain, fell 12 feet  COMPARISON: None FINDINGS:  Vertebral body height in the thoracic spine is normal. There is a subcutaneous nodule medially in the right hemithorax. Thoracic aorta is normal. Heart size is normal. There is no hilar or mediastinal lymph nodes. There are small left axillary nodes. There is borderline hyperinflation in the upper lungs. There is minimal scarring in the right middle lobe and left lung base. No obvious thoracic fractures are noted. There is a moderate amount of upper abdominal ascites/fluid. Liver,pancreas, and kidneys are normal aside from an upper pole right renal cyst. There is a small lower pole left renal cyst. Spleen has a couple small low-attenuation zones on its periphery (series 503 image 53). Considering the amount pelvic fluid/hemorrhage findings are suspicious of a subtle splenic laceration. There is a significant amount of pelvic ascites. The density measures 61 Hounsfield. Bladder appears normal. No pelvic fractures are identified.      Small to moderate amount of upper abdominal ascites/fluid and significant amount of pelvic fluid. The density of this has Hounsfield units measuring 61, and is most compatible with hemorrhage. There are some ill-defined subtle densities in the posterior aspect of the spleen. Findings are concerning for a subtle splenic laceration. No other visceral organ abnormality is identified.  CRITICAL FINDING: Results were called and read back to the emergency room on 5/31/2019 at 1430 hours    CBC:   Lab Results   Component Value Date    WBC 20.0 06/12/2019    RBC 3.22 06/12/2019    HGB 9.2 06/12/2019    HCT 28.6 06/12/2019    MCV 88.8 06/12/2019    MCH 28.6 06/12/2019    MCHC 32.2 06/12/2019    RDW 14.8 06/12/2019     06/12/2019    MPV 9.5 06/12/2019     BMP:    Lab Results   Component Value Date     06/12/2019    K 4.5 06/12/2019    K 4.5 06/05/2019     06/12/2019    CO2 21 06/12/2019    BUN 10 06/12/2019    LABALBU 2.5 06/12/2019    CREATININE 0.9 06/12/2019    CALCIUM 8.5 06/12/2019    GFRAA >60 06/12/2019    LABGLOM >60 06/12/2019    GLUCOSE 91 06/12/2019      sodium chloride  250 mL Intravenous Once    lactulose  20 g Oral BID    magnesium citrate  296 mL Oral Once    sodium chloride (Inhalant)  4 mL Nebulization Q4H WA    nicotine  1 patch Transdermal Daily    Followed by   Yung Salt ON 6/17/2019] nicotine  1 patch Transdermal Daily    ipratropium-albuterol  1 ampule Inhalation Q4H WA    cefepime  2 g Intravenous Q8H    bisacodyl  10 mg Rectal Daily    lidocaine  1 patch Transdermal Daily    methocarbamol  1,500 mg Oral 4x Daily    enoxaparin  30 mg Subcutaneous BID    docusate sodium  100 mg Oral BID    senna  1 tablet Oral Nightly    sodium chloride flush  10 mL Intravenous 2 times per day    bacitracin zinc   Topical TID    acetaminophen  650 mg Oral Q4H    atorvastatin  40 mg Oral Nightly     Collar in place, follows commnds perrl eomi motor full  Assessment:  Patient Active Problem List   Diagnosis    Chest pain    Tobacco use    Atypical chest pain    Cocaine use  Nodule of left lung    Numbness and tingling of right lower extremity    Unstable angina (HCC)    Leukocytosis    Hep C w/o coma, chronic (HCC)    Hematoma of spleen, closed, initial encounter    Traumatic hemoperitoneum    Closed traumatic minimally displaced fracture of one rib of left side    Acute blood loss anemia    Laceration of spleen    Multiple closed fractures of ribs of left side     Plan:Continue current care  Ari Root M.D.

## 2019-06-12 NOTE — PLAN OF CARE
Problem: Falls - Risk of:  Goal: Will remain free from falls  Description  Will remain free from falls  Outcome: Met This Shift  Goal: Absence of physical injury  Description  Absence of physical injury  Outcome: Met This Shift     Problem: Pain:  Goal: Pain level will decrease  Description  Pain level will decrease  Outcome: Met This Shift     Problem: Anxiety/Stress:  Goal: Level of anxiety will decrease  Description  Level of anxiety will decrease  Outcome: Met This Shift     Problem: Fluid Volume - Imbalance:  Goal: Absence of imbalanced fluid volume signs and symptoms  Description  Absence of imbalanced fluid volume signs and symptoms  Outcome: Met This Shift     Problem: Skin Integrity - Impaired:  Goal: Will show no infection signs and symptoms  Description  Will show no infection signs and symptoms  Outcome: Met This Shift  Goal: Absence of new skin breakdown  Description  Absence of new skin breakdown  Outcome: Met This Shift

## 2019-06-12 NOTE — PLAN OF CARE
Problem: Falls - Risk of:  Goal: Will remain free from falls  Description  Will remain free from falls  Outcome: Met This Shift     Problem: Pain:  Goal: Pain level will decrease  Description  Pain level will decrease  Outcome: Met This Shift     Problem: Anxiety/Stress:  Goal: Level of anxiety will decrease  Description  Level of anxiety will decrease  Outcome: Met This Shift     Problem: Skin Integrity - Impaired:  Goal: Will show no infection signs and symptoms  Description  Will show no infection signs and symptoms  Outcome: Met This Shift  Goal: Absence of new skin breakdown  Description  Absence of new skin breakdown  Outcome: Met This Shift

## 2019-06-12 NOTE — PROGRESS NOTES
Nutrition Therapies:  · Oral Diet Orders: Clear Liquid   · Oral Diet intake: %  · Anthropometric Measures:  · Ht: 6' (182.9 cm)   · Current Body Wt: 181 lb (82.1 kg)(6/10 actual)  · Admission Body Wt: 186 lb (84.4 kg)(5/31 bed on admit )  · Usual Body Wt: 174 lb (78.9 kg)(7/2018 actual per EMR- per pt statement- UBW ~190 )  · % Weight Change:  ,  noted 2.6% wt loss x ~2 weeks,  however current fluids remain + at this time   · Ideal Body Wt: 178 lb (80.7 kg), % Ideal Body 102%  · BMI Classification: BMI 18.5 - 24.9 Normal Weight    Nutrition Interventions:   Continued Inpatient Monitoring, Coordination of Care    Nutrition Evaluation:   · Evaluation: Goals set   · Goals: ADAT; Pt to consume >75% of meals and ONS     · Monitoring: Nutrition Progression, Meal Intake, Supplement Intake, Diet Tolerance, Skin Integrity, Wound Healing, I&O, Monitor Hemodynamic Status, Monitor Bowel Function, Weight, Pertinent Labs      Electronically signed by Adan Homans, MS, RD, LD on 6/12/19 at 11:19 AM  Contact Number: [phone]

## 2019-06-12 NOTE — PROGRESS NOTES
Strength ROM Additional Info:    RUE  Wfl, formal MMT deferred due to cervical / abdominal precautions  wfl good  and wfl FMC/dexterity noted during ADL tasks      LUE Wfl, formal MMT deferred due to cervical / abdominal precautions  wfl good  and wfl FMC/dexterity noted during ADL tasks      Hearing:  WFL  Sensation: WFL  Tone: WFL  Edema:none noted       Comments: Ok from RN to see pt. Andres Golden arrival pt supine in bed and agreeable to session with PT collaboration; wife present during session. Vitals monitored continuously during session. Pt educated on log rolling technique to increase independence and reduce abdominal pain for bed mobility. After bed mobility, pt performed STS and functional mobility around the unit holding cup of coffee using no device at SBA; VC for pacing and proper breathing techniques. Increased abdominal pain upon return to room; reinforced importance of pacing and taking rest breaks to avoid over fatigue/strain. Completed clothing/linen retrieval and sat EOB for seated rest break. Completed grooming tasks as stated above, BSC trf with SBA to ensure appropriate height and LB dressing as stated above with max safety cues to reduce risk of fall. Pt reports abdominal discomfort when sitting in arm chair due to leaning too far back. Pillow was placed behind back to increase upright sitting posture with good comfort/results. Standing tolerance up to 10 minutes this date with SpO2 >95% on 6L. F attn to task, easily distracted, decreased safety awareness and problem solving. Pt agreeable to sit up in chair end of session with all devices within reach, all lines and tubes intact.  Prior to and at the end of session, environmental modifications/line management completed for patients safety and efficiency of treatment session. RN notified of pt status.     Pt required continued cues and education as noted above for safe facilitation and completion of tasks.  During functional activites and ADLs pt additionally educated on posture, energy conservation/pacing/breathing techniques, OT POC/progression, OT role, importance of increasing OOB activity with staff assist d/t multiple lines, completing ADL tasks daily as IND as possible to aide in recovery process, fall risk precautions/call light use. Therapist provided skilled monitoring of HR, O2 saturation, blood pressure and patients response during treatment session.       Vitals:   BP at rest: 115/71 BP at end of session: 112/72   HR at rest: 92 bpm HR at end of session: 98 bpm   Spo2 at rest: 98% 6L HFNC Spo2 at end of session: 96% 6L HFNC      · Pt has made good progress towards set goals.    · Continue with current plan of care     Time in: 09:05  Time out: 09:45  Total Tx Time: 40 minutes  Adriel Lakhani OTR/L [id number]

## 2019-06-12 NOTE — PLAN OF CARE
Problem: Malnutrition  (NI-5.2)  Goal: Food and/or Nutrient Delivery  Description  Individualized approach for food/nutrient provision.   Outcome: Met This Shift

## 2019-06-12 NOTE — PROGRESS NOTES
Surgical Intensive Care Unit   Daily Progress Note     Date of admission: 5/31/2019    Reason for ICU: hypoxia 2/2 Nosocomial PNA, fall 10 ft, rib fx, grade 3 splenic hematoma, hemoperitoneum s/p POD6 splenectomy, colon serosal tear rpr 6/5, C spine ligamentous injury    Pertinent Hospital Course Events:   5/31-- Taurus Maxwell a 50 y. o. male who presents from home for evaluation of fall from 10 feet. Patient fell on his left side, hit his head and denies LOC. He is not on [address]. He is having sharp pain in his left flank, shoulder and midline in his neck.   CT scan demonstrates left rib fracture with splenic laceration and free intraabdominal blood without blush. Admitted to SICU for hemodynamic and H&H monitoring   6/1--nothing new overnight  6/2--no new issues  6/3-- no issues overnight  6/4 Transferred from SICU to general floor. Continues to complain of cervical spine pain. 6/5 MRI cervical spine showed ligamentous injury. Neurosurgery consulted. Custom cervical collar ordered  6/6: Hypotensive and pale, STAT CT abdomen/pelvis demonstrated a rebleeding of splenic laceration, s/p EX-lap with splenectomy and repair or colonic serosal tears. Pain control moderately controlled this AM. Awaiting bowel function. 6/7: Pain controlled. No return of bowel function. Voiding s/p Gardiner removal.  6/8: Continue no bowel function, NPO, +OOB. Received 1 U PRBC yesterday  6/9: +flatus, tachypneic though not SOB, stated on CLD, saline locked   6/10: worsening hypoxia, transferred to SICU. CTA showed pneumonia, no PE. Started on vanc, cefepime for HCAP  6/11: procal 10, transfuse 1U PRBC  6/12: Stopped cefepime, started rocephin, hgb stable    Overnight Events: no acute events, weaning O2; on NC 6L satting 97%    Subjective: Pain with coughing surrounding abdominal incision and left ribs unchanged compared to previous.      Physical Exam:   /71   Pulse 78   Temp 99.6 °F (37.6 °C) (Temporal)   Resp 19   Ht 6' (1.829 m)   Wt 181 lb 9.6 oz (82.4 kg)   SpO2 97%   BMI 24.63 kg/m²     I/O last 3 completed shifts: In: 2300 [P.O.:1300; I.V.:500; Blood:350; IV Piggyback:150]  Out: 4800 [Urine:4800]  I/O this shift:  In: -   Out: 200 [Urine:200]    General: No apparent distress, comfortable, awake, appropriate  HEENT: Custom C collar in place, Trachea midline, no masses, Pupils equal round  Chest: Clear breath sounds b/l, Respiratory effort was normal with no retractions or use of accessory muscles, lidocaine patch left ribs, SMI 1750  Cardiovascular: Heart sounds were normal with a regular rate  Abdomen:  Soft with mildly distended.   Appropriate tenderness without guarding, rebound, or rigidity; serosanguinous drainage from incision, non tender around wound  Extremities: Moves all 4 extremeties, No pedal edema      Assessment/Plan:     Neuro: C spine ligamentous injury - C collar   pain control -Robaxin and prn meds  CV: Tobacco use - on nicoderm  Pulm: Acute Resp Failure 2/2 trauma and PNA   Pulm toilet: Encourage IS - [address]50 this AM, coughing and deep breathing   On NC 6L/m- wean O2  GI: Grade 3 splenic lac with rebleed s/p splenectomy   clear liquid diet, advance as tolerated, on bowel regimen   ID consult for vaccines for encapsulated bacteria prior to d/c   CT abdomen/pelvis showed no dehis or abscess  Constipation - passing gas, but no BM yet, continue bowel regimen, increase diet, needs to have BM  Renal: Creatinine within normal limits, no edema indicating fluid overload  ID: afebrile, stop cefepime, start rocephin, strep and legionella neg, UA WNL, SpCx OPF  Endo: glucose near normal range, replete lytes prn no acute issues  MSK: no acute issues, PT/OT  Heme: Anemia 2/2 blood loss from surgery, hgb stable; leukocytosis and thrombocytosis most likely 2/2 to spolenectomy; leukocytosis improving; SCDs, lovenox      Code status:  Full Code    Disposition:  Transfer soon    Electronically signed by Vianey Sanchez DO on 6/12/2019 at 9:26 AM

## 2019-06-12 NOTE — PROGRESS NOTES
Physical Therapy  Daily Treatment Note  NAME: Lynda Chavez  : 1970  MRN: [id number]    Date of Service: 2019    Re-Evaluating PT:  Rosita Chau, PT, DPT [id number]     ROOM #: [id number]  DIAGNOSIS: Hematoma of spleen  PRECAUTIONS: Falls, C-collar, c-spine precautions, abdominal precautions, L rib fx  PMHx: GERD  PROCEDURES:  emergent ex lap, spleenectomy     Social:  Pt lives with wife in a 2nd floor apartment with 4 step(s) and 1 rail(s) to enter.  Once inside, >10 steps and 1 rail to apartment. Prior to admission pt walked with no device and was Independent.  Pt works in construction.                                  Re- Evaluation  Date: 19 Treatment  Date: 19 Short Term/ Long Term   Goals   AM-PAC 6 Clicks      Does pt have pain? 7/10 abdomen Abdominal pain     Bed Mobility  Rolling: SBA  Supine to sit: SBA  Sit to supine: NT  Scooting: SBA Rolling: SBA  Supine to sit: Felix  Sit to supine: NT  Scooting: SBA toward EOB Mod Independent   Transfers Sit to stand: SBA  Stand to sit: SBA  Stand pivot: Felix no device Sit to stand: SBA  Stand to sit: SBA  Stand pivot: Min A without AD Independent   Ambulation   65 feet with Felix with no device 300 ft with no AD SBA   >400 feet Independently   Stair negotiation: ascended and descended NT NT >10 steps with 1 rail with Mod Independent   BLE ROM WNL NT     BLE strength Grossly 5/5 NT     Balance Sitting: SBA  Standing: Felix no device Sitting: SBA  Standing: SBA without AD Sitting: Independent  Standing: Independent     Patient education  Pt educated on pursed lip breathing, sequencing of transfers, safety awareness      Patient response to education:   Pt verbalized understanding Pt demonstrated skill Pt requires further education in this area   Yes With cueing Yes     Comments:  Pt was supine in bed upon room entry, agreeable to PT treatment. Pt O2 requirements continue to decrease, now on nasal cannula and maintaining sats >97%. Increased ambulation distance today into lopez - continues to have questionable balance but no hands on assist was needed. Pt still having majority of difficulty getting out of bed - educated on log roll technique to keep pressure off pts abdomen. Pt is very sporadic and easily distracted which causes safety concerns. Educated pt and wife on safety. Sat up in chair to end session, all needs met. Patient is making fair progress toward established physical therapy goals. Continue with physical therapy current plan of care. PLAN  --further education/practice on log roll technique bed mobility.      Time in: 0905  Time out: Adelita Naidu, PT, DPT  [id number]

## 2019-06-12 NOTE — PLAN OF CARE
Problem: Falls - Risk of:  Goal: Will remain free from falls  Description  Will remain free from falls  6/12/2019 0856 by Anirudh Choudhary RN  Outcome: Met This Shift     Problem: Falls - Risk of:  Goal: Absence of physical injury  Description  Absence of physical injury  6/12/2019 0856 by Anirudh Choudhary RN  Outcome: Met This Shift     Problem: Pain:  Goal: Pain level will decrease  Description  Pain level will decrease  6/12/2019 0856 by Anirudh Choudhary RN  Outcome: Met This Shift     Problem: Pain:  Goal: Control of acute pain  Description  Control of acute pain  Outcome: Met This Shift     Problem: Anxiety/Stress:  Goal: Level of anxiety will decrease  Description  Level of anxiety will decrease  6/12/2019 0856 by Anirudh Choudhary RN  Outcome: Met This Shift     Problem: Risk for Impaired Skin Integrity  Goal: Tissue integrity - skin and mucous membranes  Description  Structural intactness and normal physiological function of skin and  mucous membranes.   Outcome: Met This Shift

## 2019-06-13 PROBLEM — T81.49XA SURGICAL SITE INFECTION: Status: ACTIVE | Noted: 2019-06-13

## 2019-06-13 PROBLEM — J18.9 PNEUMONIA: Status: ACTIVE | Noted: 2019-06-13

## 2019-06-13 LAB
ALBUMIN SERPL-MCNC: 2.9 G/DL (ref 3.5–5.2)
ALP BLD-CCNC: 117 U/L (ref 40–129)
ALT SERPL-CCNC: 20 U/L (ref 0–40)
ANION GAP SERPL CALCULATED.3IONS-SCNC: 13 MMOL/L (ref 7–16)
AST SERPL-CCNC: 24 U/L (ref 0–39)
BILIRUB SERPL-MCNC: <0.2 MG/DL (ref 0–1.2)
BUN BLDV-MCNC: 17 MG/DL (ref 6–20)
CALCIUM SERPL-MCNC: 8.4 MG/DL (ref 8.6–10.2)
CHLORIDE BLD-SCNC: 102 MMOL/L (ref 98–107)
CO2: 22 MMOL/L (ref 22–29)
CREAT SERPL-MCNC: 0.9 MG/DL (ref 0.7–1.2)
GFR AFRICAN AMERICAN: >60
GFR NON-AFRICAN AMERICAN: >60 ML/MIN/1.73
GLUCOSE BLD-MCNC: 101 MG/DL (ref 74–99)
HCT VFR BLD CALC: 24.2 % (ref 37–54)
HEMOGLOBIN: 7.8 G/DL (ref 12.5–16.5)
MCH RBC QN AUTO: 28.4 PG (ref 26–35)
MCHC RBC AUTO-ENTMCNC: 32.2 % (ref 32–34.5)
MCV RBC AUTO: 88 FL (ref 80–99.9)
PDW BLD-RTO: 15.2 FL (ref 11.5–15)
PLATELET # BLD: 900 E9/L (ref 130–450)
PMV BLD AUTO: 9.2 FL (ref 7–12)
POTASSIUM SERPL-SCNC: 4 MMOL/L (ref 3.5–5)
RBC # BLD: 2.75 E12/L (ref 3.8–5.8)
SODIUM BLD-SCNC: 137 MMOL/L (ref 132–146)
TOTAL PROTEIN: 6.1 G/DL (ref 6.4–8.3)
WBC # BLD: 15.7 E9/L (ref 4.5–11.5)

## 2019-06-13 PROCEDURE — 6370000000 HC RX 637 (ALT 250 FOR IP): Performed by: STUDENT IN AN ORGANIZED HEALTH CARE EDUCATION/TRAINING PROGRAM

## 2019-06-13 PROCEDURE — 99232 SBSQ HOSP IP/OBS MODERATE 35: CPT | Performed by: SURGERY

## 2019-06-13 PROCEDURE — 6370000000 HC RX 637 (ALT 250 FOR IP): Performed by: SURGERY

## 2019-06-13 PROCEDURE — 80053 COMPREHEN METABOLIC PANEL: CPT

## 2019-06-13 PROCEDURE — 6360000002 HC RX W HCPCS: Performed by: SURGERY

## 2019-06-13 PROCEDURE — 2580000003 HC RX 258: Performed by: RADIOLOGY

## 2019-06-13 PROCEDURE — 1200000000 HC SEMI PRIVATE

## 2019-06-13 PROCEDURE — 2580000003 HC RX 258: Performed by: STUDENT IN AN ORGANIZED HEALTH CARE EDUCATION/TRAINING PROGRAM

## 2019-06-13 PROCEDURE — 2700000000 HC OXYGEN THERAPY PER DAY

## 2019-06-13 PROCEDURE — 94640 AIRWAY INHALATION TREATMENT: CPT

## 2019-06-13 PROCEDURE — 97530 THERAPEUTIC ACTIVITIES: CPT

## 2019-06-13 PROCEDURE — 85027 COMPLETE CBC AUTOMATED: CPT

## 2019-06-13 PROCEDURE — 36415 COLL VENOUS BLD VENIPUNCTURE: CPT

## 2019-06-13 PROCEDURE — 6360000002 HC RX W HCPCS: Performed by: STUDENT IN AN ORGANIZED HEALTH CARE EDUCATION/TRAINING PROGRAM

## 2019-06-13 RX ORDER — MORPHINE SULFATE 2 MG/ML
2 INJECTION, SOLUTION INTRAMUSCULAR; INTRAVENOUS ONCE
Status: COMPLETED | OUTPATIENT
Start: 2019-06-13 | End: 2019-06-13

## 2019-06-13 RX ADMIN — Medication 10 ML: at 20:24

## 2019-06-13 RX ADMIN — WATER 2 G: 1 INJECTION INTRAMUSCULAR; INTRAVENOUS; SUBCUTANEOUS at 18:22

## 2019-06-13 RX ADMIN — ATORVASTATIN CALCIUM 40 MG: 40 TABLET, FILM COATED ORAL at 20:31

## 2019-06-13 RX ADMIN — OXYCODONE HYDROCHLORIDE 10 MG: 10 TABLET ORAL at 01:56

## 2019-06-13 RX ADMIN — IPRATROPIUM BROMIDE AND ALBUTEROL SULFATE 1 AMPULE: .5; 3 SOLUTION RESPIRATORY (INHALATION) at 21:24

## 2019-06-13 RX ADMIN — MOMETASONE FUROATE AND FORMOTEROL FUMARATE DIHYDRATE 2 PUFF: 100; 5 AEROSOL RESPIRATORY (INHALATION) at 20:38

## 2019-06-13 RX ADMIN — METHOCARBAMOL TABLETS 1500 MG: 750 TABLET, COATED ORAL at 08:35

## 2019-06-13 RX ADMIN — Medication 10 ML: at 08:36

## 2019-06-13 RX ADMIN — DOCUSATE SODIUM 100 MG: 100 CAPSULE, LIQUID FILLED ORAL at 20:31

## 2019-06-13 RX ADMIN — OXYCODONE HYDROCHLORIDE 10 MG: 10 TABLET ORAL at 06:29

## 2019-06-13 RX ADMIN — IPRATROPIUM BROMIDE AND ALBUTEROL SULFATE 1 AMPULE: .5; 3 SOLUTION RESPIRATORY (INHALATION) at 17:10

## 2019-06-13 RX ADMIN — METHOCARBAMOL TABLETS 1500 MG: 750 TABLET, COATED ORAL at 20:37

## 2019-06-13 RX ADMIN — ACETAMINOPHEN 650 MG: 325 TABLET, FILM COATED ORAL at 13:17

## 2019-06-13 RX ADMIN — IPRATROPIUM BROMIDE AND ALBUTEROL SULFATE 1 AMPULE: .5; 3 SOLUTION RESPIRATORY (INHALATION) at 10:55

## 2019-06-13 RX ADMIN — ACETAMINOPHEN 650 MG: 325 TABLET, FILM COATED ORAL at 17:15

## 2019-06-13 RX ADMIN — IPRATROPIUM BROMIDE AND ALBUTEROL SULFATE 1 AMPULE: .5; 3 SOLUTION RESPIRATORY (INHALATION) at 05:51

## 2019-06-13 RX ADMIN — MOMETASONE FUROATE AND FORMOTEROL FUMARATE DIHYDRATE 2 PUFF: 100; 5 AEROSOL RESPIRATORY (INHALATION) at 08:33

## 2019-06-13 RX ADMIN — Medication 10 ML: at 14:28

## 2019-06-13 RX ADMIN — METHOCARBAMOL TABLETS 1500 MG: 750 TABLET, COATED ORAL at 17:15

## 2019-06-13 RX ADMIN — SENNOSIDES 8.6 MG: 8.6 TABLET, FILM COATED ORAL at 20:32

## 2019-06-13 RX ADMIN — METHOCARBAMOL TABLETS 1500 MG: 750 TABLET, COATED ORAL at 13:17

## 2019-06-13 RX ADMIN — DOCUSATE SODIUM 100 MG: 100 CAPSULE, LIQUID FILLED ORAL at 08:35

## 2019-06-13 RX ADMIN — OXYCODONE HYDROCHLORIDE 10 MG: 10 TABLET ORAL at 10:31

## 2019-06-13 RX ADMIN — OXYCODONE HYDROCHLORIDE 10 MG: 10 TABLET ORAL at 20:24

## 2019-06-13 RX ADMIN — ACETAMINOPHEN 650 MG: 325 TABLET, FILM COATED ORAL at 20:26

## 2019-06-13 RX ADMIN — OXYCODONE HYDROCHLORIDE 10 MG: 10 TABLET ORAL at 15:41

## 2019-06-13 RX ADMIN — MORPHINE SULFATE 2 MG: 2 INJECTION, SOLUTION INTRAMUSCULAR; INTRAVENOUS at 14:28

## 2019-06-13 ASSESSMENT — PAIN DESCRIPTION - PROGRESSION
CLINICAL_PROGRESSION: NOT CHANGED

## 2019-06-13 ASSESSMENT — PAIN DESCRIPTION - LOCATION
LOCATION: ABDOMEN

## 2019-06-13 ASSESSMENT — PAIN DESCRIPTION - PAIN TYPE
TYPE: SURGICAL PAIN

## 2019-06-13 ASSESSMENT — PAIN SCALES - GENERAL
PAINLEVEL_OUTOF10: 0
PAINLEVEL_OUTOF10: 3
PAINLEVEL_OUTOF10: 9
PAINLEVEL_OUTOF10: 5
PAINLEVEL_OUTOF10: 10
PAINLEVEL_OUTOF10: 5
PAINLEVEL_OUTOF10: 8
PAINLEVEL_OUTOF10: 10
PAINLEVEL_OUTOF10: 8
PAINLEVEL_OUTOF10: 8
PAINLEVEL_OUTOF10: 0
PAINLEVEL_OUTOF10: 10
PAINLEVEL_OUTOF10: 10
PAINLEVEL_OUTOF10: 8

## 2019-06-13 ASSESSMENT — PAIN DESCRIPTION - ONSET
ONSET: ON-GOING

## 2019-06-13 ASSESSMENT — PAIN DESCRIPTION - ORIENTATION
ORIENTATION: MID

## 2019-06-13 ASSESSMENT — PAIN DESCRIPTION - DESCRIPTORS
DESCRIPTORS: ACHING;CONSTANT;DISCOMFORT
DESCRIPTORS: ACHING;CONSTANT;DISCOMFORT
DESCRIPTORS: ACHING;DISCOMFORT
DESCRIPTORS: ACHING;CONSTANT;DISCOMFORT

## 2019-06-13 ASSESSMENT — PAIN DESCRIPTION - FREQUENCY
FREQUENCY: CONTINUOUS

## 2019-06-13 ASSESSMENT — PAIN - FUNCTIONAL ASSESSMENT
PAIN_FUNCTIONAL_ASSESSMENT: PREVENTS OR INTERFERES SOME ACTIVE ACTIVITIES AND ADLS

## 2019-06-13 NOTE — PROGRESS NOTES
[facility]  ATTENDING PHYSICIAN SURGERY PROGRESS NOTE     I have examined the patient, reviewed the record, and discussed the case with the APN/  Resident. I have reviewed all relevant labs and imaging data. The following summarizes my clinical findings and independent assessment. Chief Complaint   Patient presents with    Fall     fall / roll down the ladder. about 2 hours ago. abd pains. left arm pains. neck pains. collar placed onto pt. reports back pains. repports pains worse with deep inspiration        S: Patient resting comfortably in the room but states sometimes he feels his neck moving and also with midline abdominal pain     O:  Physical Exam   Constitutional: He is oriented to person, place, and time. He appears well-developed and well-nourished. HENT:   Head: Normocephalic. Eyes: Pupils are equal, round, and reactive to light. EOM are normal.   Neck:   Cervical collar in place    Cardiovascular: Normal rate. Pulmonary/Chest: Effort normal. No respiratory distress. Nasal canula oxygen    Abdominal: Soft. He exhibits no distension. Midline repacked with aquacel. Minimal erythema. Small amount of purulence at the base. Musculoskeletal: Normal range of motion. Neurological: He is alert and oriented to person, place, and time. Skin: Skin is warm. Psychiatric: He has a normal mood and affect. Assessment   Active Problems:    Hematoma of spleen, closed, initial encounter    Traumatic hemoperitoneum    Closed traumatic minimally displaced fracture of one rib of left side    Acute blood loss anemia    Laceration of spleen    Multiple closed fractures of ribs of left side    Severe protein-calorie malnutrition ([address]    Surgical site infection  Resolved Problems:    * No resolved hospital problems.  *      Plan   Regular diet Senna, Colace , lactulose   Pain control Robaxin, Oxy   Hep lock   OT/PT  18/24 Am-PAC   Aggressive pulmonary hygiene , duonebs , wean Oxygen as able   On Ceftriaxone for PNA not a VAP,  final respiratory cultures pending , ID prior to DC for vaccinations   No indication for transfusion   PCDs, Lovenox   PIV  Home lipator   No ulcer prophylaxis   No glycemic issues   Spines Cervical collar in place- NS following   Midline dressing changes for SSI     Dispo RNF     Dieudonne López MD

## 2019-06-13 NOTE — CARE COORDINATION
6/13/2019 social work transition of care  Sw followed up with pt at bedside. Plan remains for home at discharge. Sw/cm will follow and assist prn.   Electronically signed by CAROLINE Shelton on 6/13/2019 at 10:47 AM

## 2019-06-13 NOTE — PLAN OF CARE
Problem: Falls - Risk of:  Goal: Will remain free from falls  Description  Will remain free from falls  6/13/2019 1424 by Sis Hill RN  Outcome: Met This Shift     Problem: Falls - Risk of:  Goal: Absence of physical injury  Description  Absence of physical injury  6/13/2019 1424 by Sis Hill RN  Outcome: Met This Shift     Problem: Anxiety/Stress:  Goal: Level of anxiety will decrease  Description  Level of anxiety will decrease  6/13/2019 1424 by Sis Hill RN  Outcome: Met This Shift     Problem: Pain:  Goal: Pain level will decrease  Description  Pain level will decrease  6/13/2019 1424 by Sis Hill RN  Outcome: Not Met This Shift

## 2019-06-13 NOTE — PROGRESS NOTES
Messaged  to notify that the Oxycodone is not relieving his pain. And that the patient is requesting something different.

## 2019-06-13 NOTE — PROGRESS NOTES
recalling 0/3 spinal precautions. Spinal precautions reinforced. Pt performing sit to stand transfer without physical assistance. Pt educated on pacing, pursed lip breathing, and energy conseravation. Pt ambulating with decreased speed, step length, and step height bilaterally. Pt with narrow base of support. Pt spo2 remaining >95% on 4L O2 via NC. Pt required 1 standing rest break for 1-2 min post 250 ft ambulation bout due to moderate SOB. Pt rating ambulation bout 5/10 on modified rossy RPE scale. Pt transferred to EOB without physical assistance. Pt declined to perform bed mobility despite encouragement, stating he knows how to perform log roll. PT will continue with plan of care, and progress pt as able. Time in: 1428  Time out: 1444    Pt is making progress toward established Physical Therapy goals. Continue with physical therapy current plan of care.     Angelica Blankenship PT, DPT  [id number]

## 2019-06-13 NOTE — PROGRESS NOTES
and coronal MPR reconstructions. Technique: Low-dose CT  acquisition technique included one of following options; 1 . Automated exposure control, 2. Adjustment of MA and or KV according to patient's size or 3. Use of iterative reconstruction. Images reveal the vertebral bodies, their disc spaces, and the posterior facet joints to appear to be intact. There are severe degenerative changes of the spine present. There are severe degenerative changes of the facets. .    Degenerative changes    Ct Abdomen Pelvis W Iv Contrast Additional Contrast? None    Result Date: 2019  Patient MRN:  [id number] : 1970 Age: 50 years Gender: Male Order Date:  2019 12:45 PM EXAM: CT CHEST W CONTRAST, CT ABDOMEN PELVIS W IV CONTRAST Dosage: 1184 mGY-cm Contrast: 110 mL Isovue-370 vertebral body height in the thoracic spine is normal. INDICATION:  left chest pain, fell 12 feet  COMPARISON: None FINDINGS:  Vertebral body height in the thoracic spine is normal. There is a subcutaneous nodule medially in the right hemithorax. Thoracic aorta is normal. Heart size is normal. There is no hilar or mediastinal lymph nodes. There are small left axillary nodes. There is borderline hyperinflation in the upper lungs. There is minimal scarring in the right middle lobe and left lung base. No obvious thoracic fractures are noted. There is a moderate amount of upper abdominal ascites/fluid. Liver,pancreas, and kidneys are normal aside from an upper pole right renal cyst. There is a small lower pole left renal cyst. Spleen has a couple small low-attenuation zones on its periphery (series 503 image 53). Considering the amount pelvic fluid/hemorrhage findings are suspicious of a subtle splenic laceration. There is a significant amount of pelvic ascites. The density measures 61 Hounsfield. Bladder appears normal. No pelvic fractures are identified.      Small to moderate amount of upper abdominal ascites/fluid and significant amount of pelvic fluid. The density of this has Hounsfield units measuring 61, and is most compatible with hemorrhage. There are some ill-defined subtle densities in the posterior aspect of the spleen. Findings are concerning for a subtle splenic laceration. No other visceral organ abnormality is identified.  CRITICAL FINDING: Results were called and read back to the emergency room on 5/31/2019 at 1430 hours    CBC:   Lab Results   Component Value Date    WBC 15.7 06/13/2019    RBC 2.75 06/13/2019    HGB 7.8 06/13/2019    HCT 24.2 06/13/2019    MCV 88.0 06/13/2019    MCH 28.4 06/13/2019    MCHC 32.2 06/13/2019    RDW 15.2 06/13/2019     06/13/2019    MPV 9.2 06/13/2019     BMP:    Lab Results   Component Value Date     06/13/2019    K 4.0 06/13/2019    K 4.5 06/05/2019     06/13/2019    CO2 22 06/13/2019    BUN 17 06/13/2019    LABALBU 2.9 06/13/2019    CREATININE 0.9 06/13/2019    CALCIUM 8.4 06/13/2019    GFRAA >60 06/13/2019    LABGLOM >60 06/13/2019    GLUCOSE 101 06/13/2019      magnesium - glycerin - water   Rectal Once    cefTRIAXone (ROCEPHIN) IV  2 g Intravenous Q24H    mometasone-formoterol  2 puff Inhalation BID    sodium chloride  250 mL Intravenous Once    lactulose  20 g Oral BID    magnesium citrate  296 mL Oral Once    nicotine  1 patch Transdermal Daily    Followed by   Jayson Waller ON 6/17/2019] nicotine  1 patch Transdermal Daily    ipratropium-albuterol  1 ampule Inhalation Q4H WA    bisacodyl  10 mg Rectal Daily    lidocaine  1 patch Transdermal Daily    methocarbamol  1,500 mg Oral 4x Daily    enoxaparin  30 mg Subcutaneous BID    docusate sodium  100 mg Oral BID    senna  1 tablet Oral Nightly    sodium chloride flush  10 mL Intravenous 2 times per day    bacitracin zinc   Topical TID    acetaminophen  650 mg Oral Q4H    atorvastatin  40 mg Oral Nightly     Collar in place awake and alert follows commands perrl eomi  Assessment:  Patient Active Problem List   Diagnosis    Chest pain    Tobacco use    Atypical chest pain    Cocaine use    Nodule of left lung    Numbness and tingling of right lower extremity    Unstable angina (HCC)    Leukocytosis    Hep C w/o coma, chronic (HCC)    Hematoma of spleen, closed, initial encounter    Traumatic hemoperitoneum    Closed traumatic minimally displaced fracture of one rib of left side    Acute blood loss anemia    Laceration of spleen    Multiple closed fractures of ribs of left side    Severe protein-calorie malnutrition (HCC)     Plan:Continue current care  Jyotsna Lucas M.D.

## 2019-06-14 VITALS
OXYGEN SATURATION: 94 % | HEIGHT: 72 IN | TEMPERATURE: 98.4 F | WEIGHT: 181.6 LBS | BODY MASS INDEX: 24.6 KG/M2 | SYSTOLIC BLOOD PRESSURE: 112 MMHG | DIASTOLIC BLOOD PRESSURE: 62 MMHG | RESPIRATION RATE: 16 BRPM | HEART RATE: 84 BPM

## 2019-06-14 LAB
ALBUMIN SERPL-MCNC: 3.3 G/DL (ref 3.5–5.2)
ALP BLD-CCNC: 125 U/L (ref 40–129)
ALT SERPL-CCNC: 28 U/L (ref 0–40)
ANION GAP SERPL CALCULATED.3IONS-SCNC: 12 MMOL/L (ref 7–16)
AST SERPL-CCNC: 31 U/L (ref 0–39)
BILIRUB SERPL-MCNC: <0.2 MG/DL (ref 0–1.2)
BUN BLDV-MCNC: 18 MG/DL (ref 6–20)
CALCIUM SERPL-MCNC: 8.8 MG/DL (ref 8.6–10.2)
CHLORIDE BLD-SCNC: 99 MMOL/L (ref 98–107)
CO2: 25 MMOL/L (ref 22–29)
CREAT SERPL-MCNC: 0.9 MG/DL (ref 0.7–1.2)
GFR AFRICAN AMERICAN: >60
GFR NON-AFRICAN AMERICAN: >60 ML/MIN/1.73
GLUCOSE BLD-MCNC: 106 MG/DL (ref 74–99)
HCT VFR BLD CALC: 27.6 % (ref 37–54)
HEMOGLOBIN: 8.8 G/DL (ref 12.5–16.5)
MCH RBC QN AUTO: 28.3 PG (ref 26–35)
MCHC RBC AUTO-ENTMCNC: 31.9 % (ref 32–34.5)
MCV RBC AUTO: 88.7 FL (ref 80–99.9)
PDW BLD-RTO: 15.1 FL (ref 11.5–15)
PLATELET # BLD: 1122 E9/L (ref 130–450)
PMV BLD AUTO: 8.9 FL (ref 7–12)
POTASSIUM SERPL-SCNC: 4 MMOL/L (ref 3.5–5)
RBC # BLD: 3.11 E12/L (ref 3.8–5.8)
SODIUM BLD-SCNC: 136 MMOL/L (ref 132–146)
TOTAL PROTEIN: 6.9 G/DL (ref 6.4–8.3)
WBC # BLD: 13.8 E9/L (ref 4.5–11.5)

## 2019-06-14 PROCEDURE — 90472 IMMUNIZATION ADMIN EACH ADD: CPT | Performed by: INTERNAL MEDICINE

## 2019-06-14 PROCEDURE — 99238 HOSP IP/OBS DSCHRG MGMT 30/<: CPT | Performed by: SURGERY

## 2019-06-14 PROCEDURE — 90734 MENACWYD/MENACWYCRM VACC IM: CPT | Performed by: INTERNAL MEDICINE

## 2019-06-14 PROCEDURE — 2700000000 HC OXYGEN THERAPY PER DAY

## 2019-06-14 PROCEDURE — 6360000002 HC RX W HCPCS: Performed by: INTERNAL MEDICINE

## 2019-06-14 PROCEDURE — 85027 COMPLETE CBC AUTOMATED: CPT

## 2019-06-14 PROCEDURE — 90647 HIB PRP-OMP VACC 3 DOSE IM: CPT | Performed by: INTERNAL MEDICINE

## 2019-06-14 PROCEDURE — 90620 MENB-4C VACCINE IM: CPT | Performed by: INTERNAL MEDICINE

## 2019-06-14 PROCEDURE — 90471 IMMUNIZATION ADMIN: CPT | Performed by: INTERNAL MEDICINE

## 2019-06-14 PROCEDURE — 90670 PCV13 VACCINE IM: CPT | Performed by: INTERNAL MEDICINE

## 2019-06-14 PROCEDURE — 94640 AIRWAY INHALATION TREATMENT: CPT

## 2019-06-14 PROCEDURE — 6370000000 HC RX 637 (ALT 250 FOR IP): Performed by: STUDENT IN AN ORGANIZED HEALTH CARE EDUCATION/TRAINING PROGRAM

## 2019-06-14 PROCEDURE — 6370000000 HC RX 637 (ALT 250 FOR IP): Performed by: SURGERY

## 2019-06-14 PROCEDURE — 80053 COMPREHEN METABOLIC PANEL: CPT

## 2019-06-14 PROCEDURE — G0009 ADMIN PNEUMOCOCCAL VACCINE: HCPCS | Performed by: INTERNAL MEDICINE

## 2019-06-14 PROCEDURE — 36415 COLL VENOUS BLD VENIPUNCTURE: CPT

## 2019-06-14 PROCEDURE — 2580000003 HC RX 258: Performed by: STUDENT IN AN ORGANIZED HEALTH CARE EDUCATION/TRAINING PROGRAM

## 2019-06-14 RX ORDER — ALBUTEROL SULFATE 90 UG/1
2 AEROSOL, METERED RESPIRATORY (INHALATION) 4 TIMES DAILY
Qty: 3 INHALER | Refills: 1 | Status: SHIPPED | OUTPATIENT
Start: 2019-06-14

## 2019-06-14 RX ORDER — LANOLIN ALCOHOL/MO/W.PET/CERES
3 CREAM (GRAM) TOPICAL NIGHTLY PRN
Status: DISCONTINUED | OUTPATIENT
Start: 2019-06-14 | End: 2019-06-14 | Stop reason: HOSPADM

## 2019-06-14 RX ORDER — LEVOFLOXACIN 750 MG/1
750 TABLET ORAL DAILY
Qty: 2 TABLET | Refills: 0 | Status: SHIPPED | OUTPATIENT
Start: 2019-06-14 | End: 2019-06-16

## 2019-06-14 RX ADMIN — OXYCODONE HYDROCHLORIDE 10 MG: 10 TABLET ORAL at 00:23

## 2019-06-14 RX ADMIN — IPRATROPIUM BROMIDE AND ALBUTEROL SULFATE 1 AMPULE: .5; 3 SOLUTION RESPIRATORY (INHALATION) at 05:18

## 2019-06-14 RX ADMIN — CALCIUM CARBONATE (ANTACID) CHEW TAB 500 MG 500 MG: 500 CHEW TAB at 10:07

## 2019-06-14 RX ADMIN — OXYCODONE HYDROCHLORIDE 10 MG: 10 TABLET ORAL at 08:24

## 2019-06-14 RX ADMIN — DOCUSATE SODIUM 100 MG: 100 CAPSULE, LIQUID FILLED ORAL at 08:23

## 2019-06-14 RX ADMIN — MOMETASONE FUROATE AND FORMOTEROL FUMARATE DIHYDRATE 2 PUFF: 100; 5 AEROSOL RESPIRATORY (INHALATION) at 08:24

## 2019-06-14 RX ADMIN — MENINGOCOCCAL (GROUPS A, C, Y AND W-135) OLIGOSACCHARIDE DIPHTHERIA CRM197 CONJUGATE VACCINE 0.5 ML: KIT at 14:58

## 2019-06-14 RX ADMIN — PNEUMOCOCCAL 13-VALENT CONJUGATE VACCINE 0.5 ML: 2.2; 2.2; 2.2; 2.2; 2.2; 4.4; 2.2; 2.2; 2.2; 2.2; 2.2; 2.2; 2.2 INJECTION, SUSPENSION INTRAMUSCULAR at 14:56

## 2019-06-14 RX ADMIN — IPRATROPIUM BROMIDE AND ALBUTEROL SULFATE 1 AMPULE: .5; 3 SOLUTION RESPIRATORY (INHALATION) at 12:50

## 2019-06-14 RX ADMIN — METHOCARBAMOL TABLETS 1500 MG: 750 TABLET, COATED ORAL at 08:23

## 2019-06-14 RX ADMIN — OXYCODONE HYDROCHLORIDE 10 MG: 10 TABLET ORAL at 13:04

## 2019-06-14 RX ADMIN — HAEMOPHILUS B CONJUGATE VACCINE (MENINGOCOCCAL PROTEIN CONJUGATE) 7.5 MCG: 7.5 INJECTION, SUSPENSION INTRAMUSCULAR at 14:51

## 2019-06-14 RX ADMIN — Medication 10 ML: at 08:23

## 2019-06-14 RX ADMIN — OXYCODONE HYDROCHLORIDE 10 MG: 10 TABLET ORAL at 04:29

## 2019-06-14 RX ADMIN — NEISSERIA MENINGITIDIS SEROGROUP B NHBA FUSION PROTEIN ANTIGEN, NEISSERIA MENINGITIDIS SEROGROUP B FHBP FUSION PROTEIN ANTIGEN AND NEISSERIA MENINGITIDIS SEROGROUP B NADA PROTEIN ANTIGEN 0.5 ML: 50; 50; 50; 25 INJECTION, SUSPENSION INTRAMUSCULAR at 14:53

## 2019-06-14 ASSESSMENT — PAIN DESCRIPTION - DESCRIPTORS
DESCRIPTORS: ACHING;CONSTANT;DISCOMFORT

## 2019-06-14 ASSESSMENT — PAIN SCALES - GENERAL
PAINLEVEL_OUTOF10: 8
PAINLEVEL_OUTOF10: 5
PAINLEVEL_OUTOF10: 3
PAINLEVEL_OUTOF10: 0
PAINLEVEL_OUTOF10: 8
PAINLEVEL_OUTOF10: 0
PAINLEVEL_OUTOF10: 8
PAINLEVEL_OUTOF10: 8

## 2019-06-14 ASSESSMENT — PAIN DESCRIPTION - ORIENTATION
ORIENTATION: MID
ORIENTATION: MID

## 2019-06-14 ASSESSMENT — PAIN DESCRIPTION - ONSET
ONSET: ON-GOING

## 2019-06-14 ASSESSMENT — PAIN DESCRIPTION - FREQUENCY
FREQUENCY: CONTINUOUS

## 2019-06-14 ASSESSMENT — PAIN DESCRIPTION - LOCATION
LOCATION: ABDOMEN

## 2019-06-14 ASSESSMENT — PAIN DESCRIPTION - PAIN TYPE
TYPE: SURGICAL PAIN

## 2019-06-14 ASSESSMENT — PAIN - FUNCTIONAL ASSESSMENT
PAIN_FUNCTIONAL_ASSESSMENT: PREVENTS OR INTERFERES SOME ACTIVE ACTIVITIES AND ADLS
PAIN_FUNCTIONAL_ASSESSMENT: PREVENTS OR INTERFERES SOME ACTIVE ACTIVITIES AND ADLS

## 2019-06-14 ASSESSMENT — PAIN DESCRIPTION - PROGRESSION
CLINICAL_PROGRESSION: NOT CHANGED
CLINICAL_PROGRESSION: NOT CHANGED

## 2019-06-14 NOTE — DISCHARGE INSTR - COC
Continuity of Care Form    Patient Name: Mayte Green   :  1970  MRN:  [id number]    Admit date:  2019  Discharge date:  ***    Code Status Order: Full Code   Advance Directives:   Advance Care Flowsheet Documentation     Date/Time Healthcare Directive Type of Healthcare Directive Copy in [address] Agent's Name Healthcare Agent's Phone Number    [phone]  No, patient does not have an advance directive for healthcare treatment -- -- -- -- --          Admitting Physician:  Eren Leroy MD  PCP: Kenna Gaines DO    Discharging Nurse: [facility] Unit/Room#: 6289/6909-X  Discharging Unit Phone Number: ***    Emergency Contact:   Extended Emergency Contact Information  Primary Emergency Contact: Ivy Taylor  Address: [address] Phone: [phone]  Relation: Brother/Sister  Secondary Emergency Contact: [address] Phone: [phone]  Relation: Brother/Sister    Past Surgical History:  Past Surgical History:   Procedure Laterality Date    LAPAROTOMY EXPLORATORY N/A 2019    LAPAROTOMY EXPLORATORY,SPLEENECTOMY performed by Sherren Gowers, MD at [address] [place] [address]       Immunization History: There is no immunization history for the selected administration types on file for this patient.     Active Problems:  Patient Active Problem List   Diagnosis Code    Chest pain R07.9    Tobacco use Z72.0    Atypical chest pain R07.89    Cocaine use F14.90    Nodule of left lung R91.1    Numbness and tingling of right lower extremity R20.0, R20.2    Unstable angina (HCC) I20.0    Leukocytosis D72.829    Hep C w/o coma, chronic (HCC) B18.2    Hematoma of spleen, closed, initial encounter S36.029A    Traumatic hemoperitoneum S36.899A    Closed traumatic minimally displaced fracture of one rib of left side S22.32XA    Acute blood loss anemia D62    Laceration of spleen S36.039A    Multiple closed fractures of ribs of left side S22.42XA    Severe protein-calorie malnutrition (HCC) E43    Surgical site infection T81.49XA    Pneumonia J18.9       Isolation/Infection:   Isolation          No Isolation            Nurse Assessment:  Last Vital Signs: /62   Pulse 84   Temp 98.4 °F (36.9 °C) (Temporal)   Resp 16   Ht 6' (1.829 m)   Wt 181 lb 9.6 oz (82.4 kg)   SpO2 94%   BMI 24.63 kg/m²     Last documented pain score (0-10 scale): Pain Level: 5  Last Weight:   Wt Readings from Last 1 Encounters:   06/10/19 181 lb 9.6 oz (82.4 kg)     Mental Status:  {IP PT MENTAL STATUS:49664}    IV Access:  { JANNETTE IV ACCESS:626785781}    Nursing Mobility/ADLs:  Walking   {P DME ICBY:130914939}  Transfer  {P DME HPCF:105841806}  Bathing  {P DME PNRO:925749358}  Dressing  {P DME UDBE:342436300}  Toileting  {P DME CMCO:996993992}  Feeding  {[facility] DME DMCS:527319792}  Med Admin  {P DME NWDD:719599611}  Med Delivery   { JANNETTE MED Delivery:342305150}    Wound Care Documentation and Therapy:  Wound 06/12/19 Abdomen Upper; Anterior open area where staples removed (Active)   Wound Other 6/12/2019  4:00 PM   Dressing Status Clean;Dry; Intact 6/14/2019  8:00 AM   Dressing Changed Changed/New 6/12/2019  4:00 PM   Dressing/Treatment Moist to dry 6/14/2019  8:00 AM   Wound Cleansed Rinsed/Irrigated with saline 6/12/2019  4:00 PM   Wound Assessment GRANT 6/14/2019  8:00 AM   Drainage Amount Moderate 6/13/2019  8:15 AM   Drainage Description Serosanguinous 6/13/2019  8:15 AM   Trinity-wound Assessment Pink 6/12/2019  6:00 PM   Number of days: 1        Elimination:  Continence:   · Bowel: {YES / VT:43736}  · Bladder: {YES / VM:71369}  Urinary Catheter: {Urinary Catheter:806517165}   Colostomy/Ileostomy/Ileal Conduit: {YES / UF:27241}       Date of Last BM: ***    Intake/Output Summary (Last 24 hours) at 6/14/2019 1233  Last data filed at 6/14/2019 0647  Gross per 24 hour   Intake 1300 ml   Output 600 ml   Net 700 ml     I/O last 3 completed shifts:   In: 1 [P.O.:1540]  Out: 600 [Urine:600]    Safety Concerns:     [address] [facility] Safety Concerns:744075173}    Impairments/Disabilities:      [facility] Impairments/Disabilities:673465282}    Nutrition Therapy:  Current Nutrition Therapy:   [facility] Diet List:535338656}    Routes of Feeding: {CHP DME Other Feedings:152396796}  Liquids: {Slp liquid thickness:10672}  Daily Fluid Restriction: {CHP DME Yes amt example:564331534}  Last Modified Barium Swallow with Video (Video Swallowing Test): {Done Not Done XSGV:723800075}    Treatments at the Time of Hospital Discharge:   Respiratory Treatments: ***  Oxygen Therapy:  {Therapy; copd oxygen:76416}  Ventilator:    { CC Vent AWAU:177250180}    Rehab Therapies: {THERAPEUTIC INTERVENTION:9969156039}  Weight Bearing Status/Restrictions: [address] [place] [address] Weight Bearin}  Other Medical Equipment (for information only, NOT a DME order):  {EQUIPMENT:482588474}  Other Treatments: ***    Patient's personal belongings (please select all that are sent with patient):  {[facility] DME Belongings:053970639}    RN SIGNATURE:  {Esignature:087346757}    CASE MANAGEMENT/SOCIAL WORK SECTION    Inpatient Status Date: ***    Readmission Risk Assessment Score:  Readmission Risk              Risk of Unplanned Readmission:        21           Discharging to Facility/ Agency   · Name:   · Address:  · Phone:  · Fax:    Dialysis Facility (if applicable)   · Name:  · Address:  · Dialysis Schedule:  · Phone:  · Fax:    / signature: {Esignature:030815880}    PHYSICIAN SECTION    Prognosis: {Prognosis:6441618373}    Condition at Discharge: [address] Deangelo Patient Condition:809023275}    Rehab Potential (if transferring to Rehab): {Prognosis:2104954854}    Recommended Labs or Other Treatments After Discharge: ***    Physician Certification: I certify the above information and transfer of Gregory Yannick  is necessary for the continuing treatment of the diagnosis listed and that he requires {Admit to Appropriate Level of Care:82209} for {GREATER/LESS:410355996} 30 days.      Update Admission H&P: {CHP DME Changes in [facility]:393245564}    PHYSICIAN SIGNATURE:  {Esignature:870398709}

## 2019-06-14 NOTE — PROGRESS NOTES
CLINICAL PHARMACY NOTE: MEDS TO [address] Select Patient?: No  Total # of Prescriptions Filled: 4   The following medications were delivered to the patient:  · Levofloxacin 750  · Nicotine 21 patch   · Oxycodone 5  · Methocarbamol 750  Total # of Interventions Completed: 3  Time Spent (min): 30    Additional Documentation:

## 2019-06-14 NOTE — CARE COORDINATION
Pt's girlfriend given paper prescriptions with documentation indicating HCAP eligibility. When she returned from the pharmacy, she stated that the cost for the inhalers is over $500 and not included in the HCAP program. Carmen Harrell, NP notified. The pt states that he does not have a home to go to. He has an apartment and wrote a \"bad check\" and received an eviction notice. SW explained to him that the eviction process can take a month or more. Asked about family members and he replied that he is estranged from his family. Asked about his girlfriend's family and he replied that she doesn't have anyone either. Offered the [address] [place] [address] Avenue but he said he is not interested in going there. He said he is going to go back to his apartment and try to make arrangements.

## 2019-06-14 NOTE — CONSULTS
Inder He splenectomy vaccination ordered  - pneumococcal vaccine ([id number]) IM before discharge   - meningococcal polysaccharide vaccine (Menveo) IM before discharge   - Group B meningococcal vaccine-Bexsero   - HIB polysaccharide vaccine (ActHIB) IM before discharge   - Flu vaccine     - follow up at 2 months with Department of public health to get booster doses    1) pneumococcal polysaccharide vaccine (PPSV23)   2) meningococcal polysaccharide vaccine (Menactra) - second dose   3)Group B meningococcal vaccine-Bexsero is a two-dose series, with the second dose to be given at least one month after the first dose.    - get second dose of PPSV23 at 5 years after the first dose. - meningococcal vaccine booster every 5 years. -follow up with [facility] immunization clinic for booster dose. phone number [phone] you for having us see this patient in consultation. I will be discussing this case with the treating physicians.     Electronically signed by Jennifer Vila MD on 6/14/2019 at 11:38 AM

## 2019-06-14 NOTE — CARE COORDINATION
6/14/2019 social work transition of care  Sw/cm followed up with pt. Pt stated that he was panicking earlier but he will work everything out.    Electronically signed by CAROLINE Sigala on 6/14/2019 at 3:05 PM

## 2019-06-14 NOTE — PROGRESS NOTES
Neurosurg progress note  VITALS:  /62   Pulse 84   Temp 98.4 °F (36.9 °C) (Temporal)   Resp 16   Ht 6' (1.829 m)   Wt 181 lb 9.6 oz (82.4 kg)   SpO2 94%   BMI 24.63 kg/m²   24HR INTAKE/OUTPUT:    Intake/Output Summary (Last 24 hours) at 2019 1040  Last data filed at 2019 9294  Gross per 24 hour   Intake 1540 ml   Output 600 ml   Net 940 ml     Xr Chest Standard (2 Vw)    Result Date: 2019  Patient MRN:  [id number] : 1970 Age: 50 years Gender: Male Order Date:  2019 12:45 PM EXAM: XR CHEST (2 VW) 3 images INDICATION:  dyspnea, fall dyspnea, fall COMPARISON: 2019 FINDINGS: The heart and the mediastinal structures are normal. There is mild COPD. There is minimal infiltrate/atelectasis in the right lung base. There is no pneumothorax. There is gastric distention. COPD with minimal atelectasis/infiltrates in the right lung base. Ct Head Wo Contrast    Result Date: 2019  Patient MRN:  [id number] : 1970 Age: 50 years Gender: Male Order Date:  2019 12:45 PM EXAM: CT HEAD WO CONTRAST NUMBER OF IMAGES:  161 INDICATION:  HEAD TRAUMA, CLOSED, MILD, GCS >= 13, NO RISK FACTORS, NEURO EXAM NORMAL COMPARISON: None Technique: Low-dose CT  acquisition technique included one of following options; 1 . Automated exposure control, 2. Adjustment of MA and or KV according to patient's size or 3. Use of iterative reconstruction. Multiple CT sections were obtained with sagittal and coronal MPR reconstructions. The ventricles are unremarkable. The gyri and sulci appear unremarkable. The white matter appears unremarkable. There is no evidence for hemorrhage. There is no infarct identified. There is no mass effect identified. There is no mass identified. Unremarkable scan of the head.      Ct Chest W Contrast    Result Date: 2019  Patient MRN:  [id number] : 1970 Age: 50 years Gender: Male Order Date:  2019 12:45 PM EXAM: CT CHEST W CONTRAST, CT ABDOMEN PELVIS W IV CONTRAST Dosage: 1184 mGY-cm Contrast: 110 mL Isovue-370 vertebral body height in the thoracic spine is normal. INDICATION:  left chest pain, fell 12 feet  COMPARISON: None FINDINGS:  Vertebral body height in the thoracic spine is normal. There is a subcutaneous nodule medially in the right hemithorax. Thoracic aorta is normal. Heart size is normal. There is no hilar or mediastinal lymph nodes. There are small left axillary nodes. There is borderline hyperinflation in the upper lungs. There is minimal scarring in the right middle lobe and left lung base. No obvious thoracic fractures are noted. There is a moderate amount of upper abdominal ascites/fluid. Liver,pancreas, and kidneys are normal aside from an upper pole right renal cyst. There is a small lower pole left renal cyst. Spleen has a couple small low-attenuation zones on its periphery (series 503 image 53). Considering the amount pelvic fluid/hemorrhage findings are suspicious of a subtle splenic laceration. There is a significant amount of pelvic ascites. The density measures 61 Hounsfield. Bladder appears normal. No pelvic fractures are identified. Small to moderate amount of upper abdominal ascites/fluid and significant amount of pelvic fluid. The density of this has Hounsfield units measuring 61, and is most compatible with hemorrhage. There are some ill-defined subtle densities in the posterior aspect of the spleen. Findings are concerning for a subtle splenic laceration. No other visceral organ abnormality is identified.  CRITICAL FINDING: Results were called and read back to the emergency room on 2019 at 1430 hours    Ct Cervical Spine Wo Contrast    Result Date: 2019  Patient MRN:  [id number] : 1970 Age: 50 years Gender: Male Order Date:  2019 12:45 PM EXAM: CT CERVICAL SPINE WO CONTRAST NUMBER OF IMAGES:  732 INDICATION:  NECK PAIN FOLLOWING TRAUMA COMPARISON: None Multiple CT sections were obtained with sagittal and coronal MPR reconstructions. Technique: Low-dose CT  acquisition technique included one of following options; 1 . Automated exposure control, 2. Adjustment of MA and or KV according to patient's size or 3. Use of iterative reconstruction. Images reveal the vertebral bodies, their disc spaces, and the posterior facet joints to appear to be intact. There are severe degenerative changes of the spine present. There are severe degenerative changes of the facets. .    Degenerative changes    Ct Abdomen Pelvis W Iv Contrast Additional Contrast? None    Result Date: 2019  Patient MRN:  [id number] : 1970 Age: 50 years Gender: Male Order Date:  2019 12:45 PM EXAM: CT CHEST W CONTRAST, CT ABDOMEN PELVIS W IV CONTRAST Dosage: 1184 mGY-cm Contrast: 110 mL Isovue-370 vertebral body height in the thoracic spine is normal. INDICATION:  left chest pain, fell 12 feet  COMPARISON: None FINDINGS:  Vertebral body height in the thoracic spine is normal. There is a subcutaneous nodule medially in the right hemithorax. Thoracic aorta is normal. Heart size is normal. There is no hilar or mediastinal lymph nodes. There are small left axillary nodes. There is borderline hyperinflation in the upper lungs. There is minimal scarring in the right middle lobe and left lung base. No obvious thoracic fractures are noted. There is a moderate amount of upper abdominal ascites/fluid. Liver,pancreas, and kidneys are normal aside from an upper pole right renal cyst. There is a small lower pole left renal cyst. Spleen has a couple small low-attenuation zones on its periphery (series 503 image 53). Considering the amount pelvic fluid/hemorrhage findings are suspicious of a subtle splenic laceration. There is a significant amount of pelvic ascites. The density measures 61 Hounsfield. Bladder appears normal. No pelvic fractures are identified.      Small to moderate amount of upper abdominal ascites/fluid and significant amount of pelvic fluid. The density of this has Hounsfield units measuring 61, and is most compatible with hemorrhage. There are some ill-defined subtle densities in the posterior aspect of the spleen. Findings are concerning for a subtle splenic laceration. No other visceral organ abnormality is identified.  CRITICAL FINDING: Results were called and read back to the emergency room on 5/31/2019 at 1430 hours    CBC:   Lab Results   Component Value Date    WBC 13.8 06/14/2019    RBC 3.11 06/14/2019    HGB 8.8 06/14/2019    HCT 27.6 06/14/2019    MCV 88.7 06/14/2019    MCH 28.3 06/14/2019    MCHC 31.9 06/14/2019    RDW 15.1 06/14/2019    PLT 1,122 06/14/2019    MPV 8.9 06/14/2019     BMP:    Lab Results   Component Value Date     06/14/2019    K 4.0 06/14/2019    K 4.5 06/05/2019    CL 99 06/14/2019    CO2 25 06/14/2019    BUN 18 06/14/2019    LABALBU 3.3 06/14/2019    CREATININE 0.9 06/14/2019    CALCIUM 8.8 06/14/2019    GFRAA >60 06/14/2019    LABGLOM >60 06/14/2019    GLUCOSE 106 06/14/2019      magnesium - glycerin - water   Rectal Once    cefTRIAXone (ROCEPHIN) IV  2 g Intravenous Q24H    mometasone-formoterol  2 puff Inhalation BID    sodium chloride  250 mL Intravenous Once    lactulose  20 g Oral BID    magnesium citrate  296 mL Oral Once    nicotine  1 patch Transdermal Daily    Followed by   Salma Garnett ON 6/17/2019] nicotine  1 patch Transdermal Daily    ipratropium-albuterol  1 ampule Inhalation Q4H WA    bisacodyl  10 mg Rectal Daily    lidocaine  1 patch Transdermal Daily    methocarbamol  1,500 mg Oral 4x Daily    enoxaparin  30 mg Subcutaneous BID    docusate sodium  100 mg Oral BID    senna  1 tablet Oral Nightly    sodium chloride flush  10 mL Intravenous 2 times per day    bacitracin zinc   Topical TID    acetaminophen  650 mg Oral Q4H    atorvastatin  40 mg Oral Nightly     Collar in place motor full  Assessment:  Patient Active Problem List   Diagnosis    Chest pain    Tobacco use    Atypical chest pain    Cocaine use    Nodule of left lung    Numbness and tingling of right lower extremity    Unstable angina (HCC)    Leukocytosis    Hep C w/o coma, chronic (HCC)    Hematoma of spleen, closed, initial encounter    Traumatic hemoperitoneum    Closed traumatic minimally displaced fracture of one rib of left side    Acute blood loss anemia    Laceration of spleen    Multiple closed fractures of ribs of left side    Severe protein-calorie malnutrition ([address]    Surgical site infection    Pneumonia     Plan:Continue current care  Amy Reyna M.D.

## 2019-06-14 NOTE — PROGRESS NOTES
All discharge instructions reviewed with patient and all questions answered at this time. Patient provided with all necessary paperwork and prescriptions.

## 2019-06-17 ENCOUNTER — TELEPHONE (OUTPATIENT)
Dept: SURGERY | Age: 49
End: 2019-06-17

## 2019-06-17 RX ORDER — ACETAMINOPHEN 500 MG
500 TABLET ORAL 4 TIMES DAILY PRN
Qty: 360 TABLET | Refills: 1 | Status: SHIPPED | OUTPATIENT
Start: 2019-06-17 | End: 2019-06-18 | Stop reason: ALTCHOICE

## 2019-06-17 RX ORDER — IBUPROFEN 600 MG/1
600 TABLET ORAL 4 TIMES DAILY PRN
Qty: 360 TABLET | Refills: 1 | Status: SHIPPED | OUTPATIENT
Start: 2019-06-17

## 2019-06-17 NOTE — TELEPHONE ENCOUNTER
Patient called in stating he received a VM but was unable to retreive it and contacted the office. Patient informed of Dr Boone Ramirez advisement of no narcotics at this point, and to take ibuprofen and tylenol. Patient informed a prescription for each was sent to the employee pharmacy here at the hospital. Patient verbalized confirmation and understanding.      Electronically signed by Nicholas Alvarez on 6/17/19 at 3:15 PM

## 2019-06-17 NOTE — TELEPHONE ENCOUNTER
MA received a call from pt to schedule a post op appointment with Dr. Iban Rosas for ex lap/spleenecomy. MA scheduled pt for 6/21/19 at 9:45 am in [place] with Dr. Hansel Lopez. Pt accepted time/date for appointment. While on the phone, pt stated that he is out of pain medication and is still having bad post op pain/pain in his ribs, pt wants to know if there is anything he can get prescribed for pain? Or if anything Dr. Iban Rosas recommends. Pt states that he is also out of antibiotics, and is unsure because he had his spleen removed if he still needs to be on them, MA routing to Dr. Iban Rosas for advisement and RENÉE Hernandez for informational purposes.      Pharmacy is updated in chart to [address] per pt's request.     If needed, pt can be reached at [phone]    Electronically signed by Jesus Avila on 6/17/19 at 10:43 AM

## 2019-06-17 NOTE — TELEPHONE ENCOUNTER
Tylenol and ibuprofen for pain. No narcotics at this point. I can send in tylenol 500 mg q4 and ibuprofen 600 mg q6 PRN. I will send those in to the employee pharmacy. He does not need any more antibiotics.

## 2019-06-17 NOTE — TELEPHONE ENCOUNTER
JESSICA informing patient per Dr. Medellin Roads script for Tylenol and Ibuprofen sent to pharmacy.   Electronically signed by Claribel Almaraz on 6/17/19 at 2:53 PM

## 2019-06-19 ENCOUNTER — OFFICE VISIT (OUTPATIENT)
Dept: SURGERY | Age: 49
End: 2019-06-19

## 2019-06-19 ENCOUNTER — TELEPHONE (OUTPATIENT)
Dept: SURGERY | Age: 49
End: 2019-06-19

## 2019-06-19 VITALS
TEMPERATURE: 98.6 F | WEIGHT: 180 LBS | BODY MASS INDEX: 24.38 KG/M2 | HEART RATE: 76 BPM | RESPIRATION RATE: 14 BRPM | HEIGHT: 72 IN | DIASTOLIC BLOOD PRESSURE: 82 MMHG | OXYGEN SATURATION: 98 % | SYSTOLIC BLOOD PRESSURE: 126 MMHG

## 2019-06-19 DIAGNOSIS — Z98.890 POST-OPERATIVE STATE: ICD-10-CM

## 2019-06-19 PROCEDURE — 99024 POSTOP FOLLOW-UP VISIT: CPT | Performed by: SURGERY

## 2019-06-19 NOTE — PROGRESS NOTES
CC: post op check    S. Pt is here for post op follow up . He noted brown drainage this AM. He is tolerating diet.      Vitals:    06/19/19 1315   BP: 126/82   Pulse: 76   Resp: 14   Temp: 98.6 °F (37 °C)   SpO2: 98%     NAD  Custom collar present  Normal sinus  Abdomen soft nd, approp tender, inc clean    Prior to debridement      Post debridement        A/p  Midline wound not infected  Fibrinous exudate debrided sharpy  Continue wet to dry dressing changes daily (wring saline out)  Ok to shower  Continue aspen collar  Pain--take 3 OTC ibuprofen (200mg) every 6 hours PRN  Follow up in 2 weeks for staple removal    Electronically signed by Beatriz Johansen MD on 6/19/2019 at 2:06 PM

## 2019-06-19 NOTE — TELEPHONE ENCOUNTER
MA received a call from [address] [facility] [address] [place] [address]. She states the pts abdominal wound is draining brown and smells. Denies redness, vomiting or nausea. Reports pts temp is 100.1 and the second time 99.7. MA spoke with Dr. Ritu Everett who advises pt to be seen today in office for wound check. Scheduled pt 06/19/2019 @ 2:00 PM. Pt confirmed date and time.   Electronically signed by Marta Morel on 6/19/19 at 11:27 AM

## 2019-07-03 ENCOUNTER — OFFICE VISIT (OUTPATIENT)
Dept: SURGERY | Age: 49
End: 2019-07-03

## 2019-07-03 VITALS
TEMPERATURE: 97.6 F | RESPIRATION RATE: 16 BRPM | DIASTOLIC BLOOD PRESSURE: 86 MMHG | HEIGHT: 72 IN | WEIGHT: 180 LBS | OXYGEN SATURATION: 98 % | SYSTOLIC BLOOD PRESSURE: 130 MMHG | BODY MASS INDEX: 24.38 KG/M2 | HEART RATE: 75 BPM

## 2019-07-03 DIAGNOSIS — Z09 POSTOP CHECK: Primary | ICD-10-CM

## 2019-07-03 PROCEDURE — 99024 POSTOP FOLLOW-UP VISIT: CPT | Performed by: SURGERY

## 2019-07-03 RX ORDER — PSEUDOEPHED/ACETAMINOPH/DIPHEN 30MG-500MG
TABLET ORAL
COMMUNITY
Start: 2019-06-27

## 2019-07-03 NOTE — PROGRESS NOTES
CC: post op  S/p splenectomy on 6/5  I last saw him in office on 6/19  Pain is gone. The visiting home nurse and his wife are doing daily dressing changes.   Tolerating diet    Vitals:    07/03/19 1447   BP: 130/86   Pulse: 75   Resp: 16   Temp: 97.6 °F (36.4 °C)   SpO2: 98%     NAD  Abdomen soft staples present upper midline--good granulation tissue      A/p  Doing well post op  Staples removed without difficulty  Continue to pack daily wet-->dry daily  Continue to shower  Follow up in 4-6 weeks    Electronically signed by Rusty Fields MD on 7/3/2019 at 3:20 PM

## 2019-07-09 ENCOUNTER — TELEPHONE (OUTPATIENT)
Dept: SURGERY | Age: 49
End: 2019-07-09

## 2019-07-19 ENCOUNTER — TELEPHONE (OUTPATIENT)
Dept: SURGERY | Age: 49
End: 2019-07-19

## 2019-07-19 PROBLEM — Z98.890 POST-OPERATIVE STATE: Status: RESOLVED | Noted: 2019-06-19 | Resolved: 2019-07-19

## 2019-07-19 NOTE — TELEPHONE ENCOUNTER
MA received a VM from Rakel patient home care nurse. Rakel was calling to notify us that patient has returned to work. Rakel states she wasn't sure if patient was cleared to go back to work but she doesn't think that he probably is with the open abdominal wound being open. aRkel can be reached at [phone]. MA routing message to [address] [place] [address] for advisement and also Louis MATHEW for informational purposes.       Electronically signed by Bandar Tyson MA on 7/19/19 at 12:54 PM

## 2019-07-25 ENCOUNTER — HOSPITAL ENCOUNTER (EMERGENCY)
Age: 49
Discharge: HOME | End: 2019-07-25
Attending: EMERGENCY MEDICINE
Payer: COMMERCIAL

## 2019-07-25 ENCOUNTER — TELEPHONE (OUTPATIENT)
Dept: ORTHOPEDIC SURGERY | Age: 49
End: 2019-07-25

## 2019-07-25 ENCOUNTER — APPOINTMENT (OUTPATIENT)
Dept: CT IMAGING | Age: 49
End: 2019-07-25
Payer: COMMERCIAL

## 2019-07-25 VITALS
HEIGHT: 72 IN | RESPIRATION RATE: 18 BRPM | HEART RATE: 71 BPM | WEIGHT: 180 LBS | SYSTOLIC BLOOD PRESSURE: 155 MMHG | BODY MASS INDEX: 24.38 KG/M2 | OXYGEN SATURATION: 98 % | TEMPERATURE: 98 F | DIASTOLIC BLOOD PRESSURE: 84 MMHG

## 2019-07-25 DIAGNOSIS — Z09 POSTOP CHECK: Primary | ICD-10-CM

## 2019-07-25 LAB
ALBUMIN SERPL-MCNC: 4.3 G/DL (ref 3.5–5.2)
ALP BLD-CCNC: 69 U/L (ref 40–129)
ALT SERPL-CCNC: 11 U/L (ref 0–40)
ANION GAP SERPL CALCULATED.3IONS-SCNC: 11 MMOL/L (ref 7–16)
AST SERPL-CCNC: 29 U/L (ref 0–39)
BASOPHILS ABSOLUTE: 0.09 E9/L (ref 0–0.2)
BASOPHILS RELATIVE PERCENT: 1 % (ref 0–2)
BILIRUB SERPL-MCNC: 0.2 MG/DL (ref 0–1.2)
BUN BLDV-MCNC: 12 MG/DL (ref 6–20)
CALCIUM SERPL-MCNC: 9.4 MG/DL (ref 8.6–10.2)
CHLORIDE BLD-SCNC: 105 MMOL/L (ref 98–107)
CO2: 27 MMOL/L (ref 22–29)
CREAT SERPL-MCNC: 0.9 MG/DL (ref 0.7–1.2)
EOSINOPHILS ABSOLUTE: 0.62 E9/L (ref 0.05–0.5)
EOSINOPHILS RELATIVE PERCENT: 6.8 % (ref 0–6)
GFR AFRICAN AMERICAN: >60
GFR NON-AFRICAN AMERICAN: >60 ML/MIN/1.73
GLUCOSE BLD-MCNC: 75 MG/DL (ref 74–99)
HCT VFR BLD CALC: 40.2 % (ref 37–54)
HEMOGLOBIN: 12.4 G/DL (ref 12.5–16.5)
IMMATURE GRANULOCYTES #: 0.02 E9/L
IMMATURE GRANULOCYTES %: 0.2 % (ref 0–5)
LACTIC ACID: 1.5 MMOL/L (ref 0.5–2.2)
LYMPHOCYTES ABSOLUTE: 2.38 E9/L (ref 1.5–4)
LYMPHOCYTES RELATIVE PERCENT: 26.2 % (ref 20–42)
MCH RBC QN AUTO: 27.4 PG (ref 26–35)
MCHC RBC AUTO-ENTMCNC: 30.8 % (ref 32–34.5)
MCV RBC AUTO: 88.9 FL (ref 80–99.9)
MONOCYTES ABSOLUTE: 1.05 E9/L (ref 0.1–0.95)
MONOCYTES RELATIVE PERCENT: 11.6 % (ref 2–12)
NEUTROPHILS ABSOLUTE: 4.93 E9/L (ref 1.8–7.3)
NEUTROPHILS RELATIVE PERCENT: 54.2 % (ref 43–80)
PDW BLD-RTO: 15 FL (ref 11.5–15)
PLATELET # BLD: 478 E9/L (ref 130–450)
PMV BLD AUTO: 10.5 FL (ref 7–12)
POTASSIUM SERPL-SCNC: 4.9 MMOL/L (ref 3.5–5)
RBC # BLD: 4.52 E12/L (ref 3.8–5.8)
SODIUM BLD-SCNC: 143 MMOL/L (ref 132–146)
TOTAL PROTEIN: 7.8 G/DL (ref 6.4–8.3)
WBC # BLD: 9.1 E9/L (ref 4.5–11.5)

## 2019-07-25 PROCEDURE — 80053 COMPREHEN METABOLIC PANEL: CPT

## 2019-07-25 PROCEDURE — 74177 CT ABD & PELVIS W/CONTRAST: CPT

## 2019-07-25 PROCEDURE — 87040 BLOOD CULTURE FOR BACTERIA: CPT

## 2019-07-25 PROCEDURE — 36415 COLL VENOUS BLD VENIPUNCTURE: CPT

## 2019-07-25 PROCEDURE — 99284 EMERGENCY DEPT VISIT MOD MDM: CPT

## 2019-07-25 PROCEDURE — 2580000003 HC RX 258: Performed by: EMERGENCY MEDICINE

## 2019-07-25 PROCEDURE — 99283 EMERGENCY DEPT VISIT LOW MDM: CPT | Performed by: SURGERY

## 2019-07-25 PROCEDURE — 85025 COMPLETE CBC W/AUTO DIFF WBC: CPT

## 2019-07-25 PROCEDURE — 83605 ASSAY OF LACTIC ACID: CPT

## 2019-07-25 PROCEDURE — 6360000004 HC RX CONTRAST MEDICATION: Performed by: RADIOLOGY

## 2019-07-25 RX ORDER — SODIUM CHLORIDE 0.9 % (FLUSH) 0.9 %
10 SYRINGE (ML) INJECTION PRN
Status: DISCONTINUED | OUTPATIENT
Start: 2019-07-25 | End: 2019-07-25 | Stop reason: HOSPADM

## 2019-07-25 RX ORDER — SODIUM CHLORIDE 9 MG/ML
INJECTION, SOLUTION INTRAVENOUS CONTINUOUS
Status: DISCONTINUED | OUTPATIENT
Start: 2019-07-25 | End: 2019-07-25 | Stop reason: HOSPADM

## 2019-07-25 RX ORDER — 0.9 % SODIUM CHLORIDE 0.9 %
1000 INTRAVENOUS SOLUTION INTRAVENOUS ONCE
Status: COMPLETED | OUTPATIENT
Start: 2019-07-25 | End: 2019-07-25

## 2019-07-25 RX ADMIN — IOPAMIDOL 110 ML: 755 INJECTION, SOLUTION INTRAVENOUS at 15:29

## 2019-07-25 RX ADMIN — SODIUM CHLORIDE 1000 ML: 9 INJECTION, SOLUTION INTRAVENOUS at 12:23

## 2019-07-25 RX ADMIN — SODIUM CHLORIDE: 9 INJECTION, SOLUTION INTRAVENOUS at 13:19

## 2019-07-25 NOTE — CONSULTS
 High Cholesterol Mother     Diabetes Father     Heart Disease Father     High Blood Pressure Father     High Cholesterol Father        Social History     Tobacco Use    Smoking status: Current Some Day Smoker     Packs/day: 0.25     Years: 30.00     Pack years: 7.50    Smokeless tobacco: Never Used   Substance Use Topics    Alcohol use: Yes     Comment: Occasionally    Drug use: No     Types: Cocaine, IV     Comment: Pt did cocaine x30 years- stopped a few months ago          Review of Systems   General ROS: negative  Hematological and Lymphatic ROS: negative  Respiratory ROS: no cough, shortness of breath, or wheezing  Cardiovascular ROS: no chest pain or dyspnea on exertion  Gastrointestinal ROS: no abdominal pain, change in bowel habits, or black or bloody stools  Genito-Urinary ROS: no dysuria, trouble voiding, or hematuria  Musculoskeletal ROS: negative      PHYSICAL EXAM:    Vitals:    07/25/19 1210   BP: 132/76   Pulse: 71   Resp: 16   Temp:    SpO2: 100%       General Appearance:  awake, alert, oriented, in no acute distress  Skin:  Skin color, texture, turgor normal. No rashes or lesions. Head/face:  NCAT  Eyes:  No gross abnormalities. , PERRL and EOMI  Lungs:  Normal expansion. Clear to auscultation. No rales, rhonchi, or wheezing. Heart:  Heart sounds are normal.  Regular rate and rhythm without murmur, gallop or rub. Abdomen:  Soft, non-tender, normal bowel sounds. No organomegaly or masses. Midline laparotomy scar, superior aspect of incision 3cm x 4cm area of granulation tissue from previously drained seroma. 5mm opening superior to umbilicus with serous drainage. Extremities: Extremities warm to touch, pink, with no edema.     LABS:    CBC  Recent Labs     07/25/19  1215   WBC 9.1   HGB 12.4*   HCT 40.2   *     BMP  Recent Labs     07/25/19  1215      K 4.9      CO2 27   BUN 12   CREATININE 0.9   CALCIUM 9.4     Liver Function  Recent Labs     07/25/19  1215 superficial skin dehiscence. Fascia intact, drainage is serous, not an infection    PLAN:  Has home health care for prior wound care. Put 2x2 wick in wound. Patient instructed on daily dressing changes at home. Follow up with Dr. Dar Sheldon in 2 weeks for reevaluation. Call office for further questions or concerns. Plan discussed with Dr. Nikolai Taylor.     Marvin Ibarra  Resident, PGY-1      Electronically signed by Jose Miguel Howell DO on 7/25/19 at 6:01 PM

## 2019-07-29 ENCOUNTER — TELEPHONE (OUTPATIENT)
Dept: SURGERY | Age: 49
End: 2019-07-29

## 2019-07-30 LAB
BLOOD CULTURE, ROUTINE: NORMAL
CULTURE, BLOOD 2: NORMAL

## 2019-08-08 ENCOUNTER — TELEPHONE (OUTPATIENT)
Dept: SURGERY | Age: 49
End: 2019-08-08

## 2019-08-14 ENCOUNTER — TELEPHONE (OUTPATIENT)
Dept: SURGERY | Age: 49
End: 2019-08-14

## 2019-08-14 NOTE — TELEPHONE ENCOUNTER
MA attempted to contact patient in regards to missed appointment with Dr. Fariha Kern.  MA unable to leave voicemail with office contact information for rescheduling purposes, no voicemail set up. No show letter mailed out to patient.     Electronically signed by Brayden Moraes on 8/14/19 at 2:17 PM

## 2019-08-15 ENCOUNTER — TELEPHONE (OUTPATIENT)
Dept: SURGERY | Age: 49
End: 2019-08-15

## 2019-08-15 NOTE — TELEPHONE ENCOUNTER
Per Dr. Fariha Kren patient is not to be reschedule for follow up appointment due to no show/non compliance. Patient also is able to be working without restrictions.   Electronically signed by Yarely Witner on 8/15/19 at 10:20 AM

## 2022-07-29 ENCOUNTER — APPOINTMENT (OUTPATIENT)
Dept: GENERAL RADIOLOGY | Age: 52
End: 2022-07-29
Payer: COMMERCIAL

## 2022-07-29 ENCOUNTER — HOSPITAL ENCOUNTER (EMERGENCY)
Age: 52
Discharge: HOME | End: 2022-07-29
Attending: EMERGENCY MEDICINE
Payer: COMMERCIAL

## 2022-07-29 VITALS
HEART RATE: 100 BPM | DIASTOLIC BLOOD PRESSURE: 100 MMHG | BODY MASS INDEX: 24.38 KG/M2 | TEMPERATURE: 98 F | OXYGEN SATURATION: 99 % | HEIGHT: 72 IN | SYSTOLIC BLOOD PRESSURE: 168 MMHG | WEIGHT: 180 LBS | RESPIRATION RATE: 20 BRPM

## 2022-07-29 DIAGNOSIS — S80.00XA CONTUSION OF KNEE, UNSPECIFIED LATERALITY, INITIAL ENCOUNTER: ICD-10-CM

## 2022-07-29 DIAGNOSIS — S22.41XA CLOSED FRACTURE OF MULTIPLE RIBS OF RIGHT SIDE, INITIAL ENCOUNTER: Primary | ICD-10-CM

## 2022-07-29 DIAGNOSIS — S50.12XA CONTUSION OF LEFT FOREARM, INITIAL ENCOUNTER: ICD-10-CM
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
## 2022-07-29 PROCEDURE — 73560 X-RAY EXAM OF KNEE 1 OR 2: CPT

## 2022-07-29 PROCEDURE — 73090 X-RAY EXAM OF FOREARM: CPT

## 2022-07-29 PROCEDURE — 99283 EMERGENCY DEPT VISIT LOW MDM: CPT

## 2022-07-29 PROCEDURE — 71101 X-RAY EXAM UNILAT RIBS/CHEST: CPT

## 2022-07-29 RX ORDER — HYDROCODONE BITARTRATE AND ACETAMINOPHEN 5; 325 MG/1; MG/1
1 TABLET ORAL EVERY 8 HOURS PRN
Qty: 21 TABLET | Refills: 0 | Status: SHIPPED | OUTPATIENT
Start: 2022-07-29 | End: 2022-07-29

## 2022-07-29 RX ORDER — HYDROCODONE BITARTRATE AND ACETAMINOPHEN 5; 325 MG/1; MG/1
1 TABLET ORAL EVERY 8 HOURS PRN
Qty: 21 TABLET | Refills: 0 | Status: SHIPPED | OUTPATIENT
Start: 2022-07-29 | End: 2022-08-05

## 2022-07-29 RX ORDER — KETOROLAC TROMETHAMINE 30 MG/ML
30 INJECTION, SOLUTION INTRAMUSCULAR; INTRAVENOUS ONCE
Status: DISCONTINUED | OUTPATIENT
Start: 2022-07-29 | End: 2022-07-29 | Stop reason: HOSPADM

## 2022-07-29 ASSESSMENT — ENCOUNTER SYMPTOMS
SINUS PRESSURE: 0
BACK PAIN: 0
EYE REDNESS: 0
SORE THROAT: 0
VOMITING: 0
ABDOMINAL PAIN: 0
EYE DISCHARGE: 0
NAUSEA: 0
WHEEZING: 0
EYE PAIN: 0
COUGH: 0
SHORTNESS OF BREATH: 0
DIARRHEA: 0

## 2022-07-29 ASSESSMENT — PAIN SCALES - GENERAL
PAINLEVEL_OUTOF10: 6
PAINLEVEL_OUTOF10: 10

## 2022-07-29 ASSESSMENT — PAIN - FUNCTIONAL ASSESSMENT: PAIN_FUNCTIONAL_ASSESSMENT: 0-10

## 2022-07-29 ASSESSMENT — PAIN DESCRIPTION - DESCRIPTORS: DESCRIPTORS: SHARP;ACHING
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
## (undated) DEVICE — DRIP REDUCTION MANIFOLD

## (undated) DEVICE — SET INSTRUMENT LAP II

## (undated) DEVICE — PATIENT RETURN ELECTRODE, SINGLE-USE, CONTACT QUALITY MONITORING, ADULT, WITH 9FT CORD, FOR PATIENTS WEIGING OVER 33LBS. (15KG): Brand: MEGADYNE

## (undated) DEVICE — TOWEL,OR,DSP,ST,BLUE,STD,6/PK,12PK/CS: Brand: MEDLINE

## (undated) DEVICE — SURGICAL PROCEDURE PACK TRAUM

## (undated) DEVICE — CHLORAPREP 26ML ORANGE

## (undated) DEVICE — GENERATOR ELECSURG FORCETRAID

## (undated) DEVICE — SET INSTRUMENT LAP I

## (undated) DEVICE — GLOVE SURG SZ 75 STD WHT LTX SYN POLYMER BEAD REINF ANTI RL

## (undated) DEVICE — PACK,UNIV, II AURORA: Brand: MEDLINE

## (undated) DEVICE — DRAPE THER FLUID WARMING 66X44 IN FLAT SLUSH DBL DISC ORS